# Patient Record
Sex: FEMALE | Race: WHITE | NOT HISPANIC OR LATINO | Employment: FULL TIME | URBAN - METROPOLITAN AREA
[De-identification: names, ages, dates, MRNs, and addresses within clinical notes are randomized per-mention and may not be internally consistent; named-entity substitution may affect disease eponyms.]

---

## 2017-04-10 ENCOUNTER — HOSPITAL ENCOUNTER (OUTPATIENT)
Dept: ULTRASOUND IMAGING | Facility: CLINIC | Age: 42
Discharge: HOME | End: 2017-04-10
Payer: COMMERCIAL

## 2017-04-10 ENCOUNTER — OFFICE VISIT (OUTPATIENT)
Dept: FAMILY MEDICINE | Facility: CLINIC | Age: 42
End: 2017-04-10
Payer: COMMERCIAL

## 2017-04-10 VITALS
HEIGHT: 65 IN | DIASTOLIC BLOOD PRESSURE: 74 MMHG | SYSTOLIC BLOOD PRESSURE: 102 MMHG | HEART RATE: 66 BPM | WEIGHT: 199 LBS | OXYGEN SATURATION: 98 % | BODY MASS INDEX: 33.15 KG/M2 | TEMPERATURE: 98.2 F

## 2017-04-10 DIAGNOSIS — R19.5 LOOSE STOOLS: ICD-10-CM

## 2017-04-10 DIAGNOSIS — R10.13 EPIGASTRIC PAIN: Primary | ICD-10-CM

## 2017-04-10 DIAGNOSIS — R10.13 ABDOMINAL PAIN, EPIGASTRIC: ICD-10-CM

## 2017-04-10 PROCEDURE — 36415 COLL VENOUS BLD VENIPUNCTURE: CPT | Performed by: PHYSICIAN ASSISTANT

## 2017-04-10 PROCEDURE — 99213 OFFICE O/P EST LOW 20 MIN: CPT | Performed by: PHYSICIAN ASSISTANT

## 2017-04-10 PROCEDURE — 76705 ECHO EXAM OF ABDOMEN: CPT

## 2017-04-10 PROCEDURE — 83516 IMMUNOASSAY NONANTIBODY: CPT | Mod: 59 | Performed by: PHYSICIAN ASSISTANT

## 2017-04-10 PROCEDURE — 83516 IMMUNOASSAY NONANTIBODY: CPT | Performed by: PHYSICIAN ASSISTANT

## 2017-04-10 NOTE — MR AVS SNAPSHOT
After Visit Summary   4/10/2017    Teresa Brian    MRN: [id number]           Patient Information     Date Of Birth          1975        Visit Information        Provider Department      4/10/2017 1:40 PM Anastasia Chen PA-C [facility]        Today's Diagnoses     Epigastric pain    -  1    Loose stools          Care Instructions    Hx/exam most suggestive that this is GI in origin.  Stop etoh use, spicy foods, and NSAID use.  Continue prilosec for now.  R/o h pylori and would advise EGD for further evaluation.  Follow-up pending results.  Consider GI consult if indicated.    Electronically Signed By: Anastasia Chen PA-C          Follow-ups after your visit        Additional Services     GASTROENTEROLOGY ADULT REF PROCEDURE ONLY       Last Lab Result: Creatinine (mg/dL)       Date                     Value                 04/07/2014               0.73             ----------  Body mass index is 33.12 kg/(m^2).      Patient will be contacted to schedule procedure.     Please be aware that coverage of these services is subject to the terms and limitations of your health insurance plan.  Call member services at your health plan with any benefit or coverage questions.  Any procedures must be performed at a [facility] facility OR coordinated by your clinic's referral office.    Please bring the following with you to your appointment:    (1) Any X-Rays, CTs or MRIs which have been performed.  Contact the facility where they were done to arrange for  prior to your scheduled appointment.    (2) List of current medications   (3) This referral request   (4) Any documents/labs given to you for this referral                  Your next 10 appointments already scheduled     Nov 30, 2017  3:00 PM CST   PHYSICAL with Daniella Duncan MD   [facility] ([facility])    [address] [address]  "  [phone]              Future tests that were ordered for you today     Open Future Orders        Priority Expected Expires Ordered    H Pylori antigen, stool Routine  5/10/2017 4/10/2017            Who to contact     If you have questions or need follow up information about today's clinic visit or your schedule please contact [facility] directly at [phone].  Normal or non-critical lab and imaging results will be communicated to you by MyChart, letter or phone within 4 business days after the clinic has received the results. If you do not hear from us within 7 days, please contact the clinic through [employer] or phone. If you have a critical or abnormal lab result, we will notify you by phone as soon as possible.  Submit refill requests through [employer] or call your pharmacy and they will forward the refill request to us. Please allow 3 business days for your refill to be completed.          Additional Information About Your Visit        [employer] Information     [employer] gives you secure access to your electronic health record. If you see a primary care provider, you can also send messages to your care team and make appointments. If you have questions, please call your primary care clinic.  If you do not have a primary care provider, please call [phone] and they will assist you.        Care EveryWhere ID     This is your Care EveryWhere ID. This could be used by other organizations to access your [facility] medical records  [id number]        Your Vitals Were     Pulse Temperature Height Pulse Oximetry Breastfeeding? BMI (Body Mass Index)    66 98.2  F (36.8  C) (Oral) 5' 5\" (1.651 m) 98% No 33.12 kg/m2       Blood Pressure from Last 3 Encounters:   04/10/17 102/74   12/20/16 104/72   11/29/16 120/80    Weight from Last 3 Encounters:   04/10/17 199 lb (90.3 kg)   12/20/16 207 lb (93.9 kg)   11/29/16 205 lb (93 kg)              We Performed the Following     GASTROENTEROLOGY ADULT REF PROCEDURE " ONLY     Tissue transglutaminase raymond IgA and IgG        Primary Care Provider Office Phone # Fax #    Anastasia Chen PA-C [phone]       [facility] [address] [address]        Thank you!     Thank you for choosing [facility]  for your care. Our goal is always to provide you with excellent care. Hearing back from our patients is one way we can continue to improve our services. Please take a few minutes to complete the written survey that you may receive in the mail after your visit with us. Thank you!             Your Updated Medication List - Protect others around you: Learn how to safely use, store and throw away your medicines at www.disposemymeds.org.          This list is accurate as of: 4/10/17  2:09 PM.  Always use your most recent med list.                   Brand Name Dispense Instructions for use    ACETAMINOPHEN PO          ibuprofen 800 MG tablet    ADVIL/MOTRIN    60 tablet    Take 1 tablet by mouth every 6 hours as needed for pain.       neomycin-polymyxin-dexamethasone 3.5-58023-7.1 Oint ophthalmic ointment    MAXITROL         OMEPRAZOLE PO      Take 20 mg by mouth       order for DME     1 Device    Equipment being ordered: Wrist/Thumb Brace

## 2017-04-10 NOTE — NURSING NOTE
"Chief Complaint   Patient presents with     Urgent Care     [facility] - [facility] on 4/7/2017       Initial /74 (BP Location: Right arm, Cuff Size: Adult Regular)  Pulse 66  Temp 98.2  F (36.8  C) (Oral)  Ht 5' 5\" (1.651 m)  Wt 199 lb (90.3 kg)  SpO2 98%  Breastfeeding? No  BMI 33.12 kg/m2 Estimated body mass index is 33.12 kg/(m^2) as calculated from the following:    Height as of this encounter: 5' 5\" (1.651 m).    Weight as of this encounter: 199 lb (90.3 kg).  Medication Reconciliation: complete    "

## 2017-04-10 NOTE — LETTER
[facility]          [address] [place] [address]                                           [phone]  April 18, 2017     Teresa Brian  [address] [address] [address]      Dear Teresa,    The results of your recent tests were reviewed and are as follows:    Normal celiac disease testing. Will await completion of h. pylori testing.    Enclosed is a copy of the results.     Thank you for choosing [facility].  We appreciate the opportunity to serve you and look forward to supporting your healthcare needs in the future.    If you have any questions or concerns, please call me or my staff at [phone].      Sincerely,    BRODIE Joe

## 2017-04-10 NOTE — PROGRESS NOTES
SUBJECTIVE:                                                    Teresa Brian is a 41 year old female who presents to clinic today for the following health issues:      ED/UC Followup:    Facility:  [facility] - Urgent [facility]  Date of visit: 4/7/2017  Reason for visit: Abdominal Pain - started in the middle of the night 3 weeks ago with sharp epigastric pain.  This was it's worst, but then ever since epigastric region has just been really sore.   Sometimes discomfort is sharp, dull and stabbing in nature.  Ranges from 3-5 in severity.   Is a 3/10 at a time.   Hard-boiled eggs, tangerines for breakfast.  Dinner between 5-7. Goes to bed between 10-midnight.  Occasional snack before bedtime.  No pop since last year. 1 cup coffee daily. Comfort creamer.   Etoh use 1-2 beers 2x per week.   Spicy food likes it, but doesn't do it all the time.  Endorses burpiness in her chest and heartburn once per week.   Sister is our MA and she advised pt to be seen.  UC report at [facility] was view via care-everywhere where she had neg CMP, CBC, lipase.   Stool pattern is stool every 2-3 days then will have diarrhea for 2-3 days with urgency.  Denies constipation.  Did loose some weight enrolling in SO new work-out plan provided through work, but then admits that habits regressed.  No melena or hematochezia.  Was then scheduled to have out-pt US this morning at [facility].  Started on prilosec 40mg daily for past 3 days and doesn't think this has helped any.   TUMs and gummy acid reducer without   Had a normal US this morning which was reviewed with pt in clinic.   Admits to taking ibuprofen 2-3 tabs daily for chronic neck, back and HAs. Will switch it up with tylenol. Started this about 1 yr ago and then more consistent almost daily for past 2 months.   No fhx of celiac, crohns, or UC.  LMP 4/8 - boyfriend had a vasectomy as well.     Had an EGD back in 1990's    Current Status: still in pain - has lab results as well as ultrasound at [facility]  today     Problem list and histories reviewed & adjusted, as indicated.  Additional history: as documented    Patient Active Problem List   Diagnosis     CARDIOVASCULAR SCREENING; LDL GOAL LESS THAN 160     BMI 34.0-34.9,adult     Menorrhagia     Personal history of cervical dysplasia     Past Surgical History:   Procedure Laterality Date     ARTHROSCOPIC RECONSTRUCTION ANTERIOR CRUCIATE LIGAMENT Right 10/2010    Reconstruction (ACL, MCL) and fibular fx     COLPOSCOPY CERVIX, LOOP ELECTRODE BIOPSY, COMBINED  08/2011    squamous metaplasia     HC TOOTH EXTRACTION W/FORCEP  2000    wisdom teeth extraction     LAPAROSCOPY DIAGNOSTIC (GYN)  1992    negative ([facility])       Social History   Substance Use Topics     Smoking status: Never Smoker     Smokeless tobacco: Never Used     Alcohol use 1.2 oz/week     2 Standard drinks or equivalent per week      Comment: 2 drinks per week avg     Family History   Problem Relation Age of Onset     Neurologic Disorder Father      parkinson with early dementia     Neurologic Disorder Mother      Fibromyalgia. migraines         Current Outpatient Prescriptions   Medication Sig Dispense Refill     OMEPRAZOLE PO Take 20 mg by mouth       ACETAMINOPHEN PO        order for DME Equipment being ordered: Wrist/Thumb Brace 1 Device 0     neomycin-polymyxin-dexamethasone (MAXITROL) 3.5-29991-1.1 OINT ophthalmic ointment        ibuprofen (ADVIL,MOTRIN) 800 MG tablet Take 1 tablet by mouth every 6 hours as needed for pain. 60 tablet 1     Allergies   Allergen Reactions     Vicodin [Hydrocodone-Acetaminophen] Nausea and Vomiting       Reviewed and updated as needed this visit by clinical staff       Reviewed and updated as needed this visit by Provider         ROS:  Constitutional, HEENT, cardiovascular, pulmonary, gi and gu systems are negative, except as otherwise noted.    OBJECTIVE:                                                    /74 (BP Location: Right arm, Cuff Size: Adult  "Regular)  Pulse 66  Temp 98.2  F (36.8  C) (Oral)  Ht 5' 5\" (1.651 m)  Wt 199 lb (90.3 kg)  SpO2 98%  Breastfeeding? No  BMI 33.12 kg/m2  Body mass index is 33.12 kg/(m^2).  GENERAL: healthy, alert and no distress  EYES: Eyes grossly normal to inspection, PERRL and conjunctivae and sclerae normal  HENT: nose and mouth without ulcers or lesions  NECK: no adenopathy, no asymmetry, masses, or scars and thyroid normal to palpation  RESP: lungs clear to auscultation - no rales, rhonchi or wheezes  CV: regular rate and rhythm, normal S1 S2, no S3 or S4, no murmur, click or rub, no peripheral edema and peripheral pulses strong  ABDOMEN: soft, but does have mid to LUQ epigastric tenderness to palpation. No other abdominal tenderness, guarding or rebound. No organomegaly.     Diagnostic Test Results:  none      ASSESSMENT/PLAN:                                                        ICD-10-CM    1. Epigastric pain R10.13 GASTROENTEROLOGY ADULT REF PROCEDURE ONLY     H Pylori antigen, stool   2. Loose stools R19.5 Tissue transglutaminase raymond IgA and IgG   ?gastritis versus r/o PUD versus h pylori.  Hx of recurrent loose stools - will screen for celiac.  Consider colonoscopy as well given varying bowel pattern to r/o obstructive pathology versus GI consult.  Long-term risks of NSAIDs reviewed as well and advised to stop this.  Would advise f/u visit to review other chronic conditions to see if we can treat this better so she does not use NSAIDs. Pt in agreement with plan.  See Patient Instructions  Patient Instructions   Hx/exam most suggestive that this is GI in origin.  Stop etoh use, spicy foods, and NSAID use.  Continue prilosec for now.  R/o h pylori and would advise EGD for further evaluation.  Follow-up pending results.  Consider GI consult if indicated.    Electronically Signed By: Anastasia Chen PA-C            "

## 2017-04-10 NOTE — PATIENT INSTRUCTIONS
Hx/exam most suggestive that this is GI in origin.  Stop etoh use, spicy foods, and NSAID use.  Continue prilosec for now.  R/o h pylori and would advise EGD for further evaluation.  Follow-up pending results.  Consider GI consult if indicated.    Electronically Signed By: Anastasia Chen PA-C

## 2017-04-12 LAB
TTG IGA SER-ACNC: NORMAL U/ML
TTG IGG SER-ACNC: NORMAL U/ML

## 2017-04-18 NOTE — PROGRESS NOTES
Please call or write patient with the following results:    -Normal celiac disease testing. Will await completion of h. pylori testing.    Electronically Signed By: Anastasia Chen PA-C

## 2017-04-24 ENCOUNTER — OFFICE VISIT (OUTPATIENT)
Dept: FAMILY MEDICINE | Facility: CLINIC | Age: 42
End: 2017-04-24
Payer: COMMERCIAL

## 2017-04-24 VITALS
WEIGHT: 198 LBS | OXYGEN SATURATION: 98 % | HEIGHT: 65 IN | SYSTOLIC BLOOD PRESSURE: 112 MMHG | HEART RATE: 80 BPM | BODY MASS INDEX: 32.99 KG/M2 | TEMPERATURE: 98.8 F | DIASTOLIC BLOOD PRESSURE: 76 MMHG

## 2017-04-24 DIAGNOSIS — R10.13 EPIGASTRIC PAIN: ICD-10-CM

## 2017-04-24 DIAGNOSIS — K59.00 CONSTIPATION, UNSPECIFIED CONSTIPATION TYPE: ICD-10-CM

## 2017-04-24 DIAGNOSIS — H26.9 CATARACT: ICD-10-CM

## 2017-04-24 DIAGNOSIS — R42 DIZZINESS: Primary | ICD-10-CM

## 2017-04-24 DIAGNOSIS — H57.9 VISUAL SYMPTOMS: ICD-10-CM

## 2017-04-24 DIAGNOSIS — R19.5 LOOSE STOOLS: ICD-10-CM

## 2017-04-24 DIAGNOSIS — G43.109 MIGRAINE WITH AURA AND WITHOUT STATUS MIGRAINOSUS, NOT INTRACTABLE: ICD-10-CM

## 2017-04-24 PROCEDURE — 99215 OFFICE O/P EST HI 40 MIN: CPT | Performed by: PHYSICIAN ASSISTANT

## 2017-04-24 PROCEDURE — 93000 ELECTROCARDIOGRAM COMPLETE: CPT | Performed by: PHYSICIAN ASSISTANT

## 2017-04-24 RX ORDER — SUMATRIPTAN 25 MG/1
25-50 TABLET, FILM COATED ORAL
Qty: 9 TABLET | Refills: 1 | Status: SHIPPED | OUTPATIENT
Start: 2017-04-24 | End: 2018-01-22

## 2017-04-24 NOTE — NURSING NOTE
"Chief Complaint   Patient presents with     Abdominal Pain     Dizziness       Initial /76 (BP Location: Right arm, Cuff Size: Adult Large)  Pulse 80  Temp 98.8  F (37.1  C) (Oral)  Ht 5' 5\" (1.651 m)  Wt 198 lb (89.8 kg)  SpO2 98%  Breastfeeding? No  BMI 32.95 kg/m2 Estimated body mass index is 32.95 kg/(m^2) as calculated from the following:    Height as of this encounter: 5' 5\" (1.651 m).    Weight as of this encounter: 198 lb (89.8 kg).  Medication Reconciliation: complete    "

## 2017-04-24 NOTE — PATIENT INSTRUCTIONS
Glad you've had some improvement.  Given persistent epigastric pain and overall abdominal discomfort would advise additional work-up with EGD and colonoscopy.  Unclear if underlying dizziness is due to migraine aura versus complicated by cataract.  Re-trial abortive medication with imitrex.  Glad you've already scheduled an appt with neurology to help optimize overall HA care.  EKG was normal today.  Prior work-up with nl CBC, CMP reassuring as well.  Could consider further imaging if not improving with that noted above.  F/u pending results.    Electronically Signed By: Anastasia Chen PA-C

## 2017-04-24 NOTE — PROGRESS NOTES
SUBJECTIVE:                                                    Teresa Brian is a 41 year old female who presents to clinic today for the following health issues:      Followup from abdominal pain - hard/sharp pain is no longer there, but continues to have stomach aches. Sometimes will feel like she has to stool, but can't. Then will have loose stools and urgency. No trial of fiber supplement.  Remote past did colace.  No longer having any persistent pain and it comes/goes.   If can't stool its 7, but usually on a 2/10.  No fhx of celiac, crohns or UC. No colon cancer.     Doing work-out routine where she runs or toning with wall squats and sometimes weights.  C25K program for 30min 3x per week. [employer] that connects to her phone and helps build up your endurance.       Has made some diet changes - limited coffee, no nsaids and it has seemed to improve a little still has a slight pain. Stopped taking tylenol as well.     Main concern is she also wants to follow-up on Dizziness - had one episode where she was driving and felt like she was going to pass out. Never had a recurrence so didn't think much of it.  Now concerned as she's slowly seems to continue to have episodes where she feels floaty, fuzzy that comes and goes.  Saturday she had it all day.   Interestingly, she then got a migraine and the floaty/fuzzy feeling resolved.  Sunday she had it all day again/felt cloudy/light-headed/dizzy.   No dieudonne syncope.  Fuzziness doesn't seem to be in the morning after a night of rest.  No CP, SOB, or palpitations.  Was able to exercise last week without any limitations - see notes above. Stopped her exercise though this week as didn't want to make things worse.  Non-smoker.  No fhx of CAD.  No melena, hematochezia, hemoptysis.   Just got diagnosed with cataracts and wonders if this could also be playing a role.  Has pre-op Wednesday for cataract. Younger sister a;ready had to have cataract removed.    Fhx: dad - brain  aneurysm s/p embolization. Found as part of check for his Parkinson's.    Chronic migraine hx - Has tried zomig, relpax, imitrex and she just stopped because she didn't want to have to keep coming in for appt.   Did make an appt on 5/9 for neurology for work-up her HA's further. [facility] of neurology.         Problem list and histories reviewed & adjusted, as indicated.  Additional history: as documented    Patient Active Problem List   Diagnosis     CARDIOVASCULAR SCREENING; LDL GOAL LESS THAN 160     BMI 34.0-34.9,adult     Menorrhagia     Personal history of cervical dysplasia     Past Surgical History:   Procedure Laterality Date     ARTHROSCOPIC RECONSTRUCTION ANTERIOR CRUCIATE LIGAMENT Right 10/2010    Reconstruction (ACL, MCL) and fibular fx     COLPOSCOPY CERVIX, LOOP ELECTRODE BIOPSY, COMBINED  08/2011    squamous metaplasia     HC TOOTH EXTRACTION W/FORCEP  2000    wisdom teeth extraction     LAPAROSCOPY DIAGNOSTIC (GYN)  1992    negative ([facility])       Social History   Substance Use Topics     Smoking status: Never Smoker     Smokeless tobacco: Never Used     Alcohol use 1.2 oz/week     2 Standard drinks or equivalent per week      Comment: 2 drinks per week avg     Family History   Problem Relation Age of Onset     Neurologic Disorder Mother      Fibromyalgia. migraines     Neurologic Disorder Father      parkinson with early dementia         Current Outpatient Prescriptions   Medication Sig Dispense Refill     order for DME Equipment being ordered: Wrist/Thumb Brace 1 Device 0     neomycin-polymyxin-dexamethasone (MAXITROL) 3.5-55028-6.1 OINT ophthalmic ointment        OMEPRAZOLE PO Take 20 mg by mouth       ACETAMINOPHEN PO        ibuprofen (ADVIL,MOTRIN) 800 MG tablet Take 1 tablet by mouth every 6 hours as needed for pain. 60 tablet 1     Allergies   Allergen Reactions     Vicodin [Hydrocodone-Acetaminophen] Nausea and Vomiting       Reviewed and updated as needed this visit by clinical  "staff       Reviewed and updated as needed this visit by Provider         ROS:  Constitutional, HEENT, cardiovascular, pulmonary, GI, , musculoskeletal, neuro, skin, endocrine and psych systems are negative, except as otherwise noted.    OBJECTIVE:                                                    /76 (BP Location: Right arm, Cuff Size: Adult Large)  Pulse 80  Temp 98.8  F (37.1  C) (Oral)  Ht 5' 5\" (1.651 m)  Wt 198 lb (89.8 kg)  SpO2 98%  Breastfeeding? No  BMI 32.95 kg/m2  Body mass index is 32.95 kg/(m^2).  GENERAL: healthy, alert and no distress  EYES: Eyes grossly normal to inspection, PERRL and conjunctivae and sclerae normal  HENT: ear canals and TM's normal, nose and mouth without ulcers or lesions  NECK: no adenopathy, no asymmetry, masses, or scars and thyroid normal to palpation  RESP: lungs clear to auscultation - no rales, rhonchi or wheezes  CV: regular rate and rhythm, normal S1 S2, no S3 or S4, no murmur, click or rub, no peripheral edema and peripheral pulses strong  ABDOMEN: bowel sounds normal continues to have mild epigastric discomfort.  NEURO: A&Ox3. No facial asymmetry. Cranial nerves grossly intact. Rapid alternating hand movements, finger-to-nose, and heel-to-shin exercises intact. Negative Romberg.     Diagnostic Test Results:  EKG - personal reading shows NSR, RSR (V1), without ST segment changes or LVH. Unchanged from tracing in 2012.     ASSESSMENT/PLAN:                                                        ICD-10-CM    1. Dizziness R42 EKG 12-lead complete w/read - Clinics   2. Visual symptoms - \"fuzziness\" per pt H57.9    3. Epigastric pain R10.13 GASTROENTEROLOGY ADULT REF PROCEDURE ONLY   4. Cataract H26.9    5. Migraine with aura and without status migrainosus, not intractable G43.109 SUMAtriptan (IMITREX) 25 MG tablet   6. Loose stools R19.5 GASTROENTEROLOGY ADULT REF PROCEDURE ONLY   7. Constipation, unspecified constipation type K59.00 GASTROENTEROLOGY ADULT REF " PROCEDURE ONLY   Some improvement of epigastric pain after d/c OTC meds and trial of omeprazole.  Given continued tenderness though advised further eval with EGD - would also likely benefit from colonoscopy given hx of constipation/loose stools.  Fuzziness/dizzy/lightheaded sensation seems less likely cardiac in origin given can run on treadmill and no hx of syncope. Did complete EKG today though and this was normal.  Suspect this is may be more a combination of cataracts and complicated by hx of her migraines.  Trial imitrex as had no abortive therapy for 2 yrs and she already scheduled a neurology consult which I encouraged her to keep.  A total of 50 minutes was spent with the patient today, with greater than 50% of the visit involving counseling and coordination of care regarding multiple concerns noted above and in pt instructions.  See Patient Instructions  Patient Instructions   Glad you've had some improvement.  Given persistent epigastric pain and overall abdominal discomfort would advise additional work-up with EGD and colonoscopy.  Unclear if underlying dizziness is due to migraine aura versus complicated by cataract.  Re-trial abortive medication with imitrex.  Glad you've already scheduled an appt with neurology to help optimize overall HA care.  EKG was normal today.  Prior work-up with nl CBC, CMP reassuring as well.  Could consider further imaging if not improving with that noted above.  F/u pending results.    Electronically Signed By: Anastasia Chen PA-C

## 2017-04-24 NOTE — MR AVS SNAPSHOT
After Visit Summary   4/24/2017    Teresa Brian    MRN: [id number]           Patient Information     Date Of Birth          1975        Visit Information        Provider Department      4/24/2017 5:20 PM Anastasia Chen PA-C [facility]        Today's Diagnoses     Dizziness    -  1    Epigastric pain        Cataract        Migraine with aura and without status migrainosus, not intractable        Loose stools        Constipation, unspecified constipation type          Care Instructions    Glad you've had some improvement.  Given persistent epigastric pain and overall abdominal discomfort would advise additional work-up with EGD and colonoscopy.  Unclear if underlying dizziness is due to migraine aura versus complicated by cataract.  Re-trial abortive medication with imitrex.  Glad you've already scheduled an appt with neurology to help optimize overall HA care.  EKG was normal today.  Prior work-up with nl CBC, CMP reassuring as well.  Could consider further imaging if not improving with that noted above.  F/u pending results.    Electronically Signed By: Anastasia Chen PA-C          Follow-ups after your visit        Additional Services     GASTROENTEROLOGY ADULT REF PROCEDURE ONLY       Last Lab Result: Creatinine (mg/dL)       Date                     Value                 04/07/2014               0.73             ----------  Body mass index is 32.95 kg/(m^2).      Patient will be contacted to schedule procedure.     Please be aware that coverage of these services is subject to the terms and limitations of your health insurance plan.  Call member services at your health plan with any benefit or coverage questions.  Any procedures must be performed at a [facility] facility OR coordinated by your clinic's referral office.    Please bring the following with you to your appointment:    (1) Any X-Rays, CTs or MRIs which have been performed.  Contact the facility where  they were done to arrange for  prior to your scheduled appointment.    (2) List of current medications   (3) This referral request   (4) Any documents/labs given to you for this referral                  Your next 10 appointments already scheduled     Apr 26, 2017  4:00 PM CDT   Pre-Op physical with Anastasia Chen PA-C   [facility]    [address]  [facility] [address] [phone]            Nov 30, 2017  3:00 PM CST   PHYSICAL with Daniella Duncan MD   [facility] ([facility])    [address]  [place] [address] [phone]              Who to contact     If you have questions or need follow up information about today's clinic visit or your schedule please contact [facility] directly at [phone].  Normal or non-critical lab and imaging results will be communicated to you by MyChart, letter or phone within 4 business days after the clinic has received the results. If you do not hear from us within 7 days, please contact the clinic through [employer] or phone. If you have a critical or abnormal lab result, we will notify you by phone as soon as possible.  Submit refill requests through [employer] or call your pharmacy and they will forward the refill request to us. Please allow 3 business days for your refill to be completed.          Additional Information About Your Visit        [employer] Information     [employer] gives you secure access to your electronic health record. If you see a primary care provider, you can also send messages to your care team and make appointments. If you have questions, please call your primary care clinic.  If you do not have a primary care provider, please call [phone] and they will assist you.        Care EveryWhere ID     This is your Care EveryWhere ID. This could be used by other organizations to access your [facility]  "records  [id number]        Your Vitals Were     Pulse Temperature Height Pulse Oximetry Breastfeeding? BMI (Body Mass Index)    80 98.8  F (37.1  C) (Oral) 5' 5\" (1.651 m) 98% No 32.95 kg/m2       Blood Pressure from Last 3 Encounters:   04/24/17 112/76   04/10/17 102/74   12/20/16 104/72    Weight from Last 3 Encounters:   04/24/17 198 lb (89.8 kg)   04/10/17 199 lb (90.3 kg)   12/20/16 207 lb (93.9 kg)              We Performed the Following     EKG 12-lead complete w/read - Clinics     GASTROENTEROLOGY ADULT REF PROCEDURE ONLY          Today's Medication Changes          These changes are accurate as of: 4/24/17  6:29 PM.  If you have any questions, ask your nurse or doctor.               Start taking these medicines.        Dose/Directions    SUMAtriptan 25 MG tablet   Commonly known as:  IMITREX   Used for:  Migraine with aura and without status migrainosus, not intractable   Started by:  Anastasia Chen PA-C        Dose:  25-50 mg   Take 1-2 tablets (25-50 mg) by mouth at onset of headache for migraine May repeat in 2 hours. Max 8 tablets/24 hours.   Quantity:  9 tablet   Refills:  1            Where to get your medicines      These medications were sent to [facility] [address] [place] [address]  [id number] [place] [address]     Phone:  [phone]     SUMAtriptan 25 MG tablet                Primary Care Provider Office Phone # Fax #    Anastasia Chen PA-C [phone]       [facility] [address] [facility] [address]        Thank you!     Thank you for choosing [facility]  for your care. Our goal is always to provide you with excellent care. Hearing back from our patients is one way we can continue to improve our services. Please take a few minutes to complete the written survey that you may receive in the mail after your visit with us. Thank you!             Your Updated Medication List - Protect " others around you: Learn how to safely use, store and throw away your medicines at www.disposemymeds.org.          This list is accurate as of: 4/24/17  6:29 PM.  Always use your most recent med list.                   Brand Name Dispense Instructions for use    ACETAMINOPHEN PO          ibuprofen 800 MG tablet    ADVIL/MOTRIN    60 tablet    Take 1 tablet by mouth every 6 hours as needed for pain.       neomycin-polymyxin-dexamethasone 3.5-47552-4.1 Oint ophthalmic ointment    MAXITROL         OMEPRAZOLE PO      Take 20 mg by mouth       order for DME     1 Device    Equipment being ordered: Wrist/Thumb Brace       SUMAtriptan 25 MG tablet    IMITREX    9 tablet    Take 1-2 tablets (25-50 mg) by mouth at onset of headache for migraine May repeat in 2 hours. Max 8 tablets/24 hours.

## 2017-04-25 ENCOUNTER — TELEPHONE (OUTPATIENT)
Dept: FAMILY MEDICINE | Facility: CLINIC | Age: 42
End: 2017-04-25

## 2017-04-26 ENCOUNTER — OFFICE VISIT (OUTPATIENT)
Dept: FAMILY MEDICINE | Facility: CLINIC | Age: 42
End: 2017-04-26
Payer: COMMERCIAL

## 2017-04-26 VITALS
TEMPERATURE: 97.8 F | BODY MASS INDEX: 33.15 KG/M2 | DIASTOLIC BLOOD PRESSURE: 78 MMHG | OXYGEN SATURATION: 99 % | WEIGHT: 199 LBS | HEIGHT: 65 IN | SYSTOLIC BLOOD PRESSURE: 108 MMHG | HEART RATE: 82 BPM

## 2017-04-26 DIAGNOSIS — Z01.818 PREOP GENERAL PHYSICAL EXAM: Primary | ICD-10-CM

## 2017-04-26 DIAGNOSIS — H26.9 CATARACT: ICD-10-CM

## 2017-04-26 PROCEDURE — 99214 OFFICE O/P EST MOD 30 MIN: CPT | Performed by: PHYSICIAN ASSISTANT

## 2017-04-26 NOTE — PROGRESS NOTES
[facility]  [address] [address] [phone]  Dept: [phone]    PRE-OP EVALUATION:  Today's date: 2017    Teresa Brian (: 1975) presents for pre-operative evaluation assessment as requested by Dr. Rios.  She requires evaluation and anesthesia risk assessment prior to undergoing surgery/procedure for treatment of cataract .  Proposed procedure: Cataract    Date of Surgery/ Procedure: 2017  Time of Surgery/ Procedure: 9 am  Hospital/Surgical Facility: [facility]  Fax number for surgical facility: [phone]  Primary Physician: Anastasia Chen  Type of Anesthesia Anticipated: Local    Patient has a Health Care Directive or Living Will:  NO    1. NO - Do you have a history of heart attack, stroke, stent, bypass or surgery on an artery in the head, neck, heart or legs?  2. NO - Do you ever have any pain or discomfort in your chest?  3. NO - Do you have a history of  Heart Failure?  4. NO - Are you troubled by shortness of breath when: walking on the level, up a slight hill or at night?  5. NO - Do you currently have a cold, bronchitis or other respiratory infection?  6. NO - Do you have a cough, shortness of breath or wheezing?  7. NO - Do you sometimes get pains in the calves of your legs when you walk?  8. NO - Do you or anyone in your family have previous history of blood clots?  9. NO - Do you or does anyone in your family have a serious bleeding problem such as prolonged bleeding following surgeries or cuts?  10. NO - Have you ever had problems with anemia or been told to take iron pills?  11. NO - Have you had any abnormal blood loss such as black, tarry or bloody stools, or abnormal vaginal bleeding?  12. NO - Have you ever had a blood transfusion?  13. YES - Have you or any of your relatives ever had problems with anesthesia? Gets nausea every time.  14. NO - Do you have sleep apnea, excessive snoring or daytime  "drowsiness?  15. NO - Do you have any prosthetic heart valves?  16. NO - Do you have prosthetic joints?  17. NO - Is there any chance that you may be pregnant?      HPI:                                                      Brief HPI related to upcoming procedure: Teresa is a 42 yo female here today for pre-op. Had been having worsening vision problems and \"fuzziness\" and was evaluated by Dr. Rios at [facility] and was diagnosed with cataracts. R worse than L and planning to just proceed with R side at this time. Her younger sister has required the same and she is 2 years younger than she. Now she is pursuing cataract removal as well.       See problem list for active medical problems.  Problems all longstanding and stable, except as noted/documented.  See ROS for pertinent symptoms related to these conditions.                                                                                                     MEDICAL HISTORY:                                                      Patient Active Problem List    Diagnosis Date Noted     Personal history of cervical dysplasia      Priority: Medium     (2009) JOANNA I; (2010) JOANNA I and focal JOANNA II       BMI 34.0-34.9,adult 10/06/2015     Priority: Medium     Menorrhagia 10/06/2015     Priority: Medium     CARDIOVASCULAR SCREENING; LDL GOAL LESS THAN 160 10/31/2010     Priority: Medium      Past Medical History:   Diagnosis Date     Asthma      CARDIOVASCULAR SCREENING; LDL GOAL LESS THAN 160      Cervical dysplasia 2010 2009-colpo @ [facility]-->JOANNA 1; persistent LGSIL/ASC-US paps; colposcopy 12/2010 with JOANNA 1, focal JOANNA 2 and benign ECC     Condyloma acuminatum 4/2009    perirecal BX      Herpes simplex virus infection     HSV-1     Menarche age 15    cycles q 3-4 x 3-4 d w cramps     Menorrhagia      Migraine      Personal history of cervical dysplasia 2010    (2009) JOANNA I; (2010) JOANNA I and focal JOANNA II     Past Surgical History:   Procedure Laterality " "Date     ARTHROSCOPIC RECONSTRUCTION ANTERIOR CRUCIATE LIGAMENT Right 10/2010    Reconstruction (ACL, MCL) and fibular fx     COLPOSCOPY CERVIX, LOOP ELECTRODE BIOPSY, COMBINED  08/2011    squamous metaplasia     HC TOOTH EXTRACTION W/FORCEP  2000    wisdom teeth extraction     LAPAROSCOPY DIAGNOSTIC (GYN)  1992    negative ([facility])     Current Outpatient Prescriptions   Medication Sig Dispense Refill     SUMAtriptan (IMITREX) 25 MG tablet Take 1-2 tablets (25-50 mg) by mouth at onset of headache for migraine May repeat in 2 hours. Max 8 tablets/24 hours. 9 tablet 1     order for DME Equipment being ordered: Wrist/Thumb Brace 1 Device 0     neomycin-polymyxin-dexamethasone (MAXITROL) 3.5-63915-6.1 OINT ophthalmic ointment        OMEPRAZOLE PO Take 20 mg by mouth       ACETAMINOPHEN PO        ibuprofen (ADVIL,MOTRIN) 800 MG tablet Take 1 tablet by mouth every 6 hours as needed for pain. 60 tablet 1     OTC products: omeprazole    Allergies   Allergen Reactions     Vicodin [Hydrocodone-Acetaminophen] Nausea and Vomiting      Latex Allergy: NO    Social History   Substance Use Topics     Smoking status: Never Smoker     Smokeless tobacco: Never Used     Alcohol use 1.2 oz/week     2 Standard drinks or equivalent per week      Comment: 2 drinks per week avg     History   Drug Use No       REVIEW OF SYSTEMS:                                                    C: NEGATIVE for fever, chills, change in weight  E/M: NEGATIVE for ear, mouth and throat problems  R: NEGATIVE for significant cough or SOB  CV: NEGATIVE for chest pain, palpitations or peripheral edema    EXAM:                                                    /78 (BP Location: Right arm, Cuff Size: Adult Large)  Pulse 82  Temp 97.8  F (36.6  C) (Oral)  Ht 5' 5\" (1.651 m)  Wt 199 lb (90.3 kg)  SpO2 99%  Breastfeeding? No  BMI 33.12 kg/m2  GENERAL APPEARANCE: healthy, alert and no distress  HENT: ear canals and TM's normal and nose and mouth without " ulcers or lesions  RESP: lungs clear to auscultation - no rales, rhonchi or wheezes  CV: regular rate and rhythm, normal S1 S2, no S3 or S4 and no murmur, click or rub   ABDOMEN: soft, nontender, no HSM or masses and bowel sounds normal  NEURO: Normal strength and tone, sensory exam grossly normal, mentation intact and speech normal    DIAGNOSTICS:                                                    No labs or EKG required for low risk surgery (cataract, skin procedure, breast biopsy, etc)    Recent Labs   Lab Test  10/23/15   0748  04/07/14   2245  10/23/13   1240   HGB  13.3  13.5   --    PLT  218  227   --    NA   --   139  141   POTASSIUM   --   3.7  5.2   CR   --   0.73  0.74        IMPRESSION:                                                    Reason for surgery/procedure: cataract   Diagnosis/reason for consult: pre-op, obesity, migraine.    The proposed surgical procedure is considered LOW risk.    REVISED CARDIAC RISK INDEX  The patient has the following serious cardiovascular risks for perioperative complications such as (MI, PE, VFib and 3  AV Block):  No serious cardiac risks  INTERPRETATION: 0 risks: Class I (very low risk - 0.4% complication rate)    The patient has the following additional risks for perioperative complications:  No identified additional risks      ICD-10-CM    1. Preop general physical exam Z01.818    2. BMI 34.0-34.9,adult Z68.34    3. Cataract H26.9        RECOMMENDATIONS:                                                      APPROVAL GIVEN to proceed with proposed procedure, without further diagnostic evaluation     Signed Electronically by: Anastasia Chen PA-C    Copy of this evaluation report is provided to requesting physician.    [facility] Preop Guidelines

## 2017-04-26 NOTE — MR AVS SNAPSHOT
After Visit Summary   4/26/2017    Teresa Brian    MRN: [id number]           Patient Information     Date Of Birth          1975        Visit Information        Provider Department      4/26/2017 4:00 PM Anastasia Chen PA-C [facility]        Today's Diagnoses     Preop general physical exam    -  1    BMI 34.0-34.9,adult        Cataract          Care Instructions      Before Your Surgery      Call your surgeon if there is any change in your health. This includes signs of a cold or flu (such as a sore throat, runny nose, cough, rash or fever).    Do not smoke, drink alcohol or take over the counter medicine (unless your surgeon or primary care doctor tells you to) for the 24 hours before and after surgery.    If you take prescribed drugs: Follow your doctor s orders about which medicines to take and which to stop until after surgery.    Eating and drinking prior to surgery: follow the instructions from your surgeon    Take a shower or bath the night before surgery. Use the soap your surgeon gave you to gently clean your skin. If you do not have soap from your surgeon, use your regular soap. Do not shave or scrub the surgery site.  Wear clean pajamas and have clean sheets on your bed.         Follow-ups after your visit        Your next 10 appointments already scheduled     Nov 30, 2017  3:00 PM CST   PHYSICAL with Daniella Duncan MD   [facility] ([facility])    [address] [place] [address] [phone]              Who to contact     If you have questions or need follow up information about today's clinic visit or your schedule please contact [facility] directly at [phone].  Normal or non-critical lab and imaging results will be communicated to you by MyChart, letter or phone within 4 business days after the clinic has received the results. If you do not hear from us within 7  "days, please contact the clinic through [employer] or phone. If you have a critical or abnormal lab result, we will notify you by phone as soon as possible.  Submit refill requests through [employer] or call your pharmacy and they will forward the refill request to us. Please allow 3 business days for your refill to be completed.          Additional Information About Your Visit        [employer] Information     [employer] gives you secure access to your electronic health record. If you see a primary care provider, you can also send messages to your care team and make appointments. If you have questions, please call your primary care clinic.  If you do not have a primary care provider, please call [phone] and they will assist you.        Care EveryWhere ID     This is your Care EveryWhere ID. This could be used by other organizations to access your [facility] medical records  [id number]        Your Vitals Were     Pulse Temperature Height Pulse Oximetry Breastfeeding? BMI (Body Mass Index)    82 97.8  F (36.6  C) (Oral) 5' 5\" (1.651 m) 99% No 33.12 kg/m2       Blood Pressure from Last 3 Encounters:   04/26/17 108/78   04/24/17 112/76   04/10/17 102/74    Weight from Last 3 Encounters:   04/26/17 199 lb (90.3 kg)   04/24/17 198 lb (89.8 kg)   04/10/17 199 lb (90.3 kg)              Today, you had the following     No orders found for display       Primary Care Provider Office Phone # Fax #    Anastasia Chen PA-C [phone]       [facility] [address] [address]        Thank you!     Thank you for choosing [facility]  for your care. Our goal is always to provide you with excellent care. Hearing back from our patients is one way we can continue to improve our services. Please take a few minutes to complete the written survey that you may receive in the mail after your visit with us. Thank you!             Your Updated Medication List - Protect others around you: " Learn how to safely use, store and throw away your medicines at www.disposemymeds.org.          This list is accurate as of: 4/26/17  4:39 PM.  Always use your most recent med list.                   Brand Name Dispense Instructions for use    ACETAMINOPHEN PO          ibuprofen 800 MG tablet    ADVIL/MOTRIN    60 tablet    Take 1 tablet by mouth every 6 hours as needed for pain.       neomycin-polymyxin-dexamethasone 3.5-08989-3.1 Oint ophthalmic ointment    MAXITROL         OMEPRAZOLE PO      Take 20 mg by mouth       order for DME     1 Device    Equipment being ordered: Wrist/Thumb Brace       SUMAtriptan 25 MG tablet    IMITREX    9 tablet    Take 1-2 tablets (25-50 mg) by mouth at onset of headache for migraine May repeat in 2 hours. Max 8 tablets/24 hours.

## 2017-04-26 NOTE — NURSING NOTE
"Chief Complaint   Patient presents with     Pre-Op Exam       Initial /78 (BP Location: Right arm, Cuff Size: Adult Large)  Pulse 82  Temp 97.8  F (36.6  C) (Oral)  Ht 5' 5\" (1.651 m)  Wt 199 lb (90.3 kg)  SpO2 99%  Breastfeeding? No  BMI 33.12 kg/m2 Estimated body mass index is 33.12 kg/(m^2) as calculated from the following:    Height as of this encounter: 5' 5\" (1.651 m).    Weight as of this encounter: 199 lb (90.3 kg).  Medication Reconciliation: complete    "

## 2017-04-27 PROBLEM — G43.109 MIGRAINE WITH AURA AND WITHOUT STATUS MIGRAINOSUS, NOT INTRACTABLE: Status: ACTIVE | Noted: 2017-04-27

## 2017-05-03 PROCEDURE — 87338 HPYLORI STOOL AG IA: CPT | Performed by: PHYSICIAN ASSISTANT

## 2017-05-04 DIAGNOSIS — R10.13 EPIGASTRIC PAIN: ICD-10-CM

## 2017-05-05 LAB
H PYLORI AG STL QL IA: NORMAL
MICRO REPORT STATUS: NORMAL
SPECIMEN SOURCE: NORMAL

## 2017-05-06 NOTE — PROGRESS NOTES
Please call or write patient with the following results:    H. Pylori testing was normal/negative. Follow-up as discussed in clinic.    Electronically Signed By: Anastasia Chen PA-C

## 2017-05-12 ENCOUNTER — TELEPHONE (OUTPATIENT)
Dept: FAMILY MEDICINE | Facility: CLINIC | Age: 42
End: 2017-05-12

## 2017-05-12 NOTE — TELEPHONE ENCOUNTER
Third attempt to reach patient to schedule Colonoscopy & EGD. Not Scheduled at [facility]. Left Messages.

## 2017-06-27 ENCOUNTER — HOSPITAL ENCOUNTER (OUTPATIENT)
Facility: CLINIC | Age: 42
Discharge: HOME | End: 2017-06-27
Attending: INTERNAL MEDICINE | Admitting: INTERNAL MEDICINE
Payer: COMMERCIAL

## 2017-06-27 VITALS
WEIGHT: 195 LBS | BODY MASS INDEX: 32.49 KG/M2 | SYSTOLIC BLOOD PRESSURE: 132 MMHG | RESPIRATION RATE: 16 BRPM | DIASTOLIC BLOOD PRESSURE: 84 MMHG | HEIGHT: 65 IN | OXYGEN SATURATION: 99 %

## 2017-06-27 LAB
COLONOSCOPY: NORMAL
UPPER GI ENDOSCOPY: NORMAL

## 2017-06-27 PROCEDURE — G0500 MOD SEDAT ENDO SERVICE >5YRS: HCPCS | Performed by: INTERNAL MEDICINE

## 2017-06-27 PROCEDURE — 43239 EGD BIOPSY SINGLE/MULTIPLE: CPT | Performed by: INTERNAL MEDICINE

## 2017-06-27 PROCEDURE — 25000132 ZZH RX MED GY IP 250 OP 250 PS 637: Performed by: INTERNAL MEDICINE

## 2017-06-27 PROCEDURE — 99153 MOD SED SAME PHYS/QHP EA: CPT | Performed by: INTERNAL MEDICINE

## 2017-06-27 PROCEDURE — 25000128 H RX IP 250 OP 636: Performed by: INTERNAL MEDICINE

## 2017-06-27 PROCEDURE — 88305 TISSUE EXAM BY PATHOLOGIST: CPT | Mod: 26 | Performed by: INTERNAL MEDICINE

## 2017-06-27 PROCEDURE — 45378 DIAGNOSTIC COLONOSCOPY: CPT | Performed by: INTERNAL MEDICINE

## 2017-06-27 PROCEDURE — 88305 TISSUE EXAM BY PATHOLOGIST: CPT | Performed by: INTERNAL MEDICINE

## 2017-06-27 RX ORDER — NALOXONE HYDROCHLORIDE 0.4 MG/ML
.1-.4 INJECTION, SOLUTION INTRAMUSCULAR; INTRAVENOUS; SUBCUTANEOUS
Status: DISCONTINUED | OUTPATIENT
Start: 2017-06-27 | End: 2017-06-27 | Stop reason: HOSPADM

## 2017-06-27 RX ORDER — FLUMAZENIL 0.1 MG/ML
0.2 INJECTION, SOLUTION INTRAVENOUS
Status: DISCONTINUED | OUTPATIENT
Start: 2017-06-27 | End: 2017-06-27 | Stop reason: HOSPADM

## 2017-06-27 RX ORDER — FENTANYL CITRATE 50 UG/ML
INJECTION, SOLUTION INTRAMUSCULAR; INTRAVENOUS PRN
Status: DISCONTINUED | OUTPATIENT
Start: 2017-06-27 | End: 2017-06-27 | Stop reason: HOSPADM

## 2017-06-27 RX ORDER — LIDOCAINE 40 MG/G
CREAM TOPICAL
Status: DISCONTINUED | OUTPATIENT
Start: 2017-06-27 | End: 2017-06-27 | Stop reason: HOSPADM

## 2017-06-27 RX ORDER — ONDANSETRON 2 MG/ML
4 INJECTION INTRAMUSCULAR; INTRAVENOUS
Status: COMPLETED | OUTPATIENT
Start: 2017-06-27 | End: 2017-06-27

## 2017-06-27 RX ORDER — ONDANSETRON 4 MG/1
4 TABLET, ORALLY DISINTEGRATING ORAL EVERY 6 HOURS PRN
Status: DISCONTINUED | OUTPATIENT
Start: 2017-06-27 | End: 2017-06-27 | Stop reason: HOSPADM

## 2017-06-27 RX ORDER — ONDANSETRON 2 MG/ML
4 INJECTION INTRAMUSCULAR; INTRAVENOUS EVERY 6 HOURS PRN
Status: DISCONTINUED | OUTPATIENT
Start: 2017-06-27 | End: 2017-06-27 | Stop reason: HOSPADM

## 2017-06-27 RX ADMIN — ONDANSETRON 4 MG: 2 INJECTION INTRAMUSCULAR; INTRAVENOUS at 14:05

## 2017-06-27 NOTE — H&P
Pre-Endoscopy History and Physical     Teresa Brian MRN# [id number]   YOB: 1975 Age: 41 year old     Date of Procedure: 6/27/2017  Primary care provider: Anastasia Chen  Type of Endoscopy: Colonoscopy with possible biopsy, possible polypectomy and Gastroscopy with possible biopsy, possible dilation  Reason for Procedure: screen,pain  Type of Anesthesia Anticipated: Conscious Sedation    HPI:    Teresa is a 41 year old female who will be undergoing the above procedure.      A history and physical has been performed. The patient's medications and allergies have been reviewed. The risks and benefits of the procedure and the sedation options and risks were discussed with the patient.  All questions were answered and informed consent was obtained.      She denies a personal or family history of anesthesia complications or bleeding disorders.     Patient Active Problem List   Diagnosis     CARDIOVASCULAR SCREENING; LDL GOAL LESS THAN 160     BMI 34.0-34.9,adult     Menorrhagia     Personal history of cervical dysplasia     Migraine with aura and without status migrainosus, not intractable        Past Medical History:   Diagnosis Date     Asthma      CARDIOVASCULAR SCREENING; LDL GOAL LESS THAN 160      Cervical dysplasia 2010 2009-colpo @ [facility]-->JOANNA 1; persistent LGSIL/ASC-US paps; colposcopy 12/2010 with JOANNA 1, focal JOANNA 2 and benign ECC     Condyloma acuminatum 4/2009    perirecal BX      Herpes simplex virus infection     HSV-1     Menarche age 15    cycles q 3-4 x 3-4 d w cramps     Menorrhagia      Migraine      Personal history of cervical dysplasia 2010    (2009) JOANNA I; (2010) JOANNA I and focal JOANNA II        Past Surgical History:   Procedure Laterality Date     ARTHROSCOPIC RECONSTRUCTION ANTERIOR CRUCIATE LIGAMENT Right 10/2010    Reconstruction (ACL, MCL) and fibular fx     COLONOSCOPY       COLPOSCOPY CERVIX, LOOP ELECTRODE BIOPSY, COMBINED  08/2011    squamous  "metaplasia     ESOPHAGOSCOPY, GASTROSCOPY, DUODENOSCOPY (EGD), COMBINED       HC TOOTH EXTRACTION W/FORCEP  2000    wisdom teeth extraction     LAPAROSCOPY DIAGNOSTIC (GYN)  1992    negative ([facility])       Social History   Substance Use Topics     Smoking status: Never Smoker     Smokeless tobacco: Never Used     Alcohol use 1.2 oz/week     2 Standard drinks or equivalent per week      Comment: 2 drinks per week avg       Family History   Problem Relation Age of Onset     Neurologic Disorder Mother      Fibromyalgia. migraines     Neurologic Disorder Father      parkinson with early dementia     Colon Cancer No family hx of        Prior to Admission medications    Medication Sig Start Date End Date Taking? Authorizing Provider   PROPRANOLOL HCL PO Take 20 mg by mouth 2 times daily   Yes Reported, Patient   SUMAtriptan (IMITREX) 25 MG tablet Take 1-2 tablets (25-50 mg) by mouth at onset of headache for migraine May repeat in 2 hours. Max 8 tablets/24 hours. 4/24/17   Anastasia Chen PA-C   order for DME Equipment being ordered: Wrist/Thumb Brace 12/20/16   Anastasia Chen PA-C   neomycin-polymyxin-dexamethasone (MAXITROL) 3.5-31773-3.1 OINT ophthalmic ointment  11/22/16   Reported, Patient   OMEPRAZOLE PO Take 20 mg by mouth    Reported, Patient   ACETAMINOPHEN PO     Reported, Patient   ibuprofen (ADVIL,MOTRIN) 800 MG tablet Take 1 tablet by mouth every 6 hours as needed for pain. 9/10/11   Vesta Mg PA-C       Allergies   Allergen Reactions     Vicodin [Hydrocodone-Acetaminophen] Nausea and Vomiting        REVIEW OF SYSTEMS:   5 point ROS negative except as noted above in HPI, including Gen., Resp., CV, GI &  system review.    PHYSICAL EXAM:   There were no vitals taken for this visit. Estimated body mass index is 33.12 kg/(m^2) as calculated from the following:    Height as of 4/26/17: 1.651 m (5' 5\").    Weight as of 4/26/17: 90.3 kg (199 lb).   GENERAL APPEARANCE: alert, and " oriented  MENTAL STATUS: alert  AIRWAY EXAM: Mallampatti Class I (visualization of the soft palate, fauces, uvula, anterior and posterior pillars)  RESP: lungs clear to auscultation - no rales, rhonchi or wheezes  CV: regular rates and rhythm  DIAGNOSTICS:    Not indicated    IMPRESSION   ASA Class 1 - Healthy patient, no medical problems    PLAN:   Plan for Colonoscopy with possible biopsy, possible polypectomy and Gastroscopy with possible biopsy, possible dilation. We discussed the risks, benefits and alternatives and the patient wished to proceed.    The above has been forwarded to the consulting provider.      Signed Electronically by: Allan Olivier  June 27, 2017

## 2017-06-27 NOTE — LETTER
June 9, 2017      Teresa Brian  [address] [address] [address]              Dear Teresa Brian,    Thank you for choosing [facility]. You are scheduled for the following services.     Date:  6-22-17      Procedure: UPPER ENDOSCOPY & COLONOSCOPY  Doctor:  Charline      Arrival Time:   1230  *check in at Emergency/Endoscopy desk*  Procedure Time:   100  Location:   [facility]        Endoscopy Department, First Floor (Enter through ER Doors) *         [address] [place], [place] [address]      [phone] or [phone] () to reschedule      Upper Endoscopy or Esophagogastroduodenoscopy (EGD) is a test performed to evaluate symptoms of persistent abdominal pain, nausea, vomiting or difficulty swallowing. It may also be used to treat various conditions of the upper gastrointestinal (GI) tract, such as bleeding, narrowing or abnormal growths. During the procedure, a doctor examines the lining of your esophagus, stomach and the first part of your small intestine through a thin, flexible tube called an endoscope. If growths or other abnormalities are found during the procedure, the doctor may remove the abnormal tissue (biopsy) for further examination.     Colonoscopy is the most accurate test to detect colon polyps and colon cancer; and the only test where polyps can be removed. During this procedure, a doctor examines the lining of your large intestine and rectum through a flexible tube.     Transportation  Arrange for a ride for the day of your procedures with a responsible adult.  A taxi ride is not an option unless you are accompanied by a responsible adult. If you fail to arrange transportation with a responsible adult, your procedure will be cancelled and rescheduled.    MIRALAX -GATORADE  PREP    Purchase the following supplies at your local pharmacy:  - 2 (two) bisacodyl tablets: each tablet contains 5 mg.  (Dulcolax  laxative  NOT Dulcolax  stool softener)   - 1 (one) 8.3 oz bottle of Polyethylene Glycol (PEG) 3350 Powder   (MiraLAX , Smooth LAX , ClearLAX  or equivalent)  - 64 oz Gatorade    Regular Gatorade, Gatorade G2 , Powerade , Powerade Zero  or Pedialyte  is acceptable. Red colored flavors are not allowed; all other colors (yellow, green, orange, purple and blue) are okay. It is also okay to buy two 2.12 oz packets of powdered Gatorade that can be mixed with water to a total volume of 64 oz of liquid.  - 1 (one) 10 oz bottle of Magnesium Citrate (Red colored flavors are not allowed)  It is also okay for you to use a 0.5 oz package of powdered magnesium citrate (17 g) mixed with 10 oz of water.      PREPARATION FOR COLONOSCOPY  7 days before:    Discontinue fiber supplements and medications containing iron. This includes Metamucil  and Fibercon ; and multivitamins with iron.  3 days before:    Begin a low-fiber diet. A low-fiber diet helps making the cleanout more effective.     Examples of a low-fiber diet include (but are not limited to): white bread, white rice, pasta, crackers, fish, chicken, eggs, ground beef, creamy peanut butter, cooked/steamed/boiled vegetables, canned fruit, bananas, melons, milk, plain yogurt cheese, salad dressing and other condiments.     The following are not allowed on a low-fiber diet: seeds, nuts, popcorn, bran, whole wheat, corn, quinoa, raw fruits and vegetables, berries and dried fruit, beans and lentils.    For additional details on low-fiber diet, please refer to the table on the last page.  2 days before:    Continue the low-fiber diet.     Drink at least 8 glasses of water throughout the day.     Stop eating solid foods at 11:45 pm.  1 day before:    In the morning: begin a clear liquid diet (liquids you can see through).     Examples of a clear liquid diet include: water, clear broth or bouillon, Gatorade, Pedialyte or Powerade, carbonated and non-carbonated soft drinks (Sprite , 7-Up ,  ginger ale), strained fruit juices without pulp (apple, white grape, white cranberry), Jell-O  and popsicles.     The following are not allowed on a clear liquid diet: red liquids, alcoholic beverages, coffee, dairy products (milk, creamer, and yogurt), protein shakes, creamy broths, juice with pulp and chewing tobacco.    At noon: take 2 (two) bisacodyl tablets     At 4 (and no later than 6pm): start drinking the Miralax-Gatorade preparation (8.3 oz of Miralax mixed with 64 oz of Gatorade in a large pitcher). Drink 1(one) 8 oz glass every 15 minutes thereafter, until the mixture is gone.    COLON CLEANSING TIPS: drink adequate amounts of fluids before and after your colon cleansing to prevent dehydration. Stay near a toilet because you will have diarrhea. Even if you are sitting on the toilet, continue to drink the cleansing solution every 15 minutes. If you feel nauseous or vomit, rinse your mouth with water, take a 15 to 30-minute-break and then continue drinking the solution. You will be uncomfortable until the stool has flushed from your colon (in about 2 to 4 hours). You may feel chilled.    Day of your procedure  You may take all of your morning medications including blood pressure medications, blood thinners (if you have not been instructed to stop these by our office), methadone, anti-seizure medications with sips of water 3 hours prior to your procedure or earlier. Do not take insulin or vitamins prior to your procedure. Continue the clear liquid diet.   4 hours prior: drink 10 oz of magnesium citrate. It may be easier to drink it with a straw.    STOP consuming all liquids after that.     Do not take anything by mouth during this time.     Allow extra time to travel to your procedure as you may need to stop and use a restroom along the way.  You are ready for the procedure, if you followed all instructions and your stool is no longer formed, but clear or yellow liquid. If you are unsure whether your colon  is clean, please call our office at [phone] before you leave for your appointment.  Bring the following to your procedure:  - Insurance Card/Photo ID.   - List of current medications including over-the-counter medications and supplements.   - Your rescue inhaler if you currently use one to control asthma.    Canceling or rescheduling your appointment:   If you must cancel or reschedule your appointment, please call [phone] as soon as possible.    COLONOSCOPY PRE-PROCEDURE CHECKLIST  If you have diabetes, ask your regular doctor for diet and medication restrictions.  If you take an anticoagulant or anti-platelet medication (such as Coumadin , Lovenox , Pradaxa , Xarelto , Eliquis , etc.), please call your primary doctor for advice on holding this medication.  If you take aspirin you may continue to do so.  If you are or may be pregnant, please discuss the risks and benefits of this procedure with your doctor.    What happens during a colonoscopy?  Plan to spend up to two hours, starting at registration time, at the endoscopy center the day of your procedure. The colonoscopy takes an average of 15 to 30 minutes. Recovery time is about 30 minutes.     Before the exam:    You will change into a gown.    Your medical history and medication list will be reviewed with you, unless that has been done over the phone prior to the procedure.     A nurse will insert an intravenous (IV) line into your hand or arm.    The doctor will meet with you and will give you a consent form to sign.    During the exam:     Medicine will be given through the IV line to help you relax.     Your heart rate and oxygen levels will be monitored. If your blood pressure is low, you may be given fluids through the IV line.     The doctor will insert a flexible hollow tube, called a colonoscope, into your rectum. The scope will be advanced slowly through the large intestine (colon).    You may have a feeling of fullness or pressure.     If  an abnormal tissue or a polyp is found, the doctor may remove it through the endoscope for closer examination, or biopsy. Tissue removal is painless    After the exam:           Any tissue samples removed during the exam will be sent to a lab for evaluation. It may take 5-7 working days for you to be notified of the results.     A nurse will provide you with complete discharge instructions before you leave the endoscopy center. Be sure to ask the nurse for specific instructions if you take blood thinners such as Aspirin, Coumadin or Plavix.     The doctor will prepare a full report for you and for the physician who referred you for the procedure.     Your doctor will talk with you about the initial results of your exam.      Medication given during the exam will prohibit you from driving for the rest of the day.     Following the exam, you may resume your normal diet. Your first meal should be light, no greasy foods. Avoid alcohol until the next day.     You may resume your regular activities the day after the procedure.       LOW-FIBER DIET  Foods RECOMMENDED Foods to AVOID   Breads, Cereal, Rice and Pasta:   White bread, rolls, biscuits, croissant and deanna toast.   Waffles, Urdu toast and pancakes.   White rice, noodles, pasta, macaroni and peeled cooked potatoes.   Plain crackers and saltines.   Cooked cereals: farina, cream of rice.   Cold cereals: Puffed Rice , Rice Krispies , Corn Flakes  and Special K    Breads, Cereal, Rice and Pasta:   Breads or rolls with nuts, seeds or fruit.   Whole wheat, pumpernickel, rye breads and cornbread.   Potatoes with skin, brown or wild rice, and kasha (buckwheat).     Vegetables:   Tender cooked and canned vegetables without seeds: carrots, asparagus tips, green or wax beans, pumpkin, spinach, lima beans. Vegetables:   Raw or steamed vegetables (w/ or without seeds)   Sarkis.   Winter squash, peas, broccoli, Brussel sprouts, cabbage, onions, cauliflower, baked beans,  peas and corn.   Fruits:   Strained fruit juice.   Canned fruit, except pineapple.   Ripe bananas and melon. Fruits:   Prunes and prune juice.   Raw fruits.   Dried fruits: figs, dates and raisins.   Milk/Dairy:   Milk: plain or flavored; yogurt; ice cream.   Custard; cheese and cottage cheese Milk/Dairy:     Meat and other proteins:   Ground, well-cooked tender beef, lamb, ham, veal, pork, fish, poultry, organ meats and eggs.   Peanut butter without nuts. Meat and other proteins:   Tough, fibrous meats with gristle.   Dry beans and peas; lentils and Tofu   Peanut butter with nuts.   Fats, Snack, Sweets, Condiments and Beverages:   Margarine, butter, oils, mayonnaise, sour cream and salad dressing, plain gravy.   Sugar, hard candy, clear jelly, honey and syrup.   Spices, cooked herbs, bouillon, broth and soups made with allowed vegetable, ketchup and mustard.   Coffee, tea and carbonated drinks.   Plain cakes, cookies and pretzels.   Gelatin, plain puddings, custard, ice cream, sherbet and popsicles. Fats, Snack, Sweets, Condiments and Beverages:   Nuts, seeds and coconut.   Jam, marmalade and preserves.   Pickles, olives, relish and horseradish.   All desserts containing nuts, seeds, dried fruit and coconut; or made from whole grains or bran.   Candy made with nuts or seeds.   Popcorn.       DIRECTIONS TO THE ENDOSCOPY DEPARTMENT  From the north ([facility])  Take [address] south, exit on [address] [address]. Get into the left hand syed, turn left (east), go one-half mile to [address] and turn left. Go north to the first stoplight, take a right on [address] and follow it to the Emergency entrance.    From the south ([facility])  Take 35N to the 35E split and exit on [address] [address]. On [address] [address], turn left (west) to [address]. Turn right (north) on [address]. Go north to the first stoplight, take a right on [address] and follow it to the Emergency  entrance.    From the east via 35E ([facility])  Take [address]E south to [address]  exit. Turn right on [address]. Go west to [address]. Turn right (north) on [address]. Go to the first stoplight, take a right and follow on [address] to the Emergency entrance.    From the east via [address] 13 ([facility])  Take [address] West to [address]. Turn left (south) on [address] to [address]. Turn left (east) on [address] and follow it to the Emergency entrance.    From the west via [address] 13 ([place])  Take [address] east to [address]. Turn right (south) on [address] to [address]. Turn left (east) on [address] and follow it to the Emergency entrance.

## 2017-06-28 LAB — COPATH REPORT: NORMAL

## 2017-07-17 ENCOUNTER — OFFICE VISIT (OUTPATIENT)
Dept: FAMILY MEDICINE | Facility: CLINIC | Age: 42
End: 2017-07-17
Payer: COMMERCIAL

## 2017-07-17 VITALS
HEART RATE: 79 BPM | WEIGHT: 201 LBS | TEMPERATURE: 98.7 F | SYSTOLIC BLOOD PRESSURE: 116 MMHG | BODY MASS INDEX: 33.49 KG/M2 | DIASTOLIC BLOOD PRESSURE: 82 MMHG | OXYGEN SATURATION: 98 % | HEIGHT: 65 IN

## 2017-07-17 DIAGNOSIS — R10.13 EPIGASTRIC PAIN: Primary | ICD-10-CM

## 2017-07-17 DIAGNOSIS — R19.5 LOOSE STOOLS: ICD-10-CM

## 2017-07-17 DIAGNOSIS — K59.00 CONSTIPATION, UNSPECIFIED CONSTIPATION TYPE: ICD-10-CM

## 2017-07-17 PROCEDURE — 99214 OFFICE O/P EST MOD 30 MIN: CPT | Performed by: PHYSICIAN ASSISTANT

## 2017-07-17 NOTE — MR AVS SNAPSHOT
After Visit Summary   7/17/2017    Teresa Brian    MRN: [id number]           Patient Information     Date Of Birth          1975        Visit Information        Provider Department      7/17/2017 4:40 PM Anastasia Chen PA-C [facility] [place]        Today's Diagnoses     Epigastric pain    -  1    Loose stools        Constipation, unspecified constipation type          Care Instructions    Given persistent epigastric pain despite PPI therapy, normal abdominal US/EGD/colonoscopy discussed further eval with abdominal CT to ensure no other sinister pathology and pt in agreement with plan.  Some question of effective medication trial given PPI therapy only for 1 month and miralax only for 2 weeks.  Would re-start until see GI for additional consult as perhaps some further benefit may be derived from this.    Electronically Signed By: Anastasia Chen PA-C            Follow-ups after your visit        Additional Services     GASTROENTEROLOGY ADULT REF CONSULT ONLY       Preferred Location: [facility], [facility] [phone] and [place] [facility] [phone]      Please be aware that coverage of these services is subject to the terms and limitations of your health insurance plan.  Call member services at your health plan with any benefit or coverage questions.  Any procedures must be performed at a [place] facility OR coordinated by your clinic's referral office.    Please bring the following with you to your appointment:    (1) Any X-Rays, CTs or MRIs which have been performed.  Contact the facility where they were done to arrange for  prior to your scheduled appointment.    (2) List of current medications   (3) This referral request   (4) Any documents/labs given to you for this referral                  Your next 10 appointments already scheduled     Nov 30, 2017  3:00 PM CST   PHYSICAL with Daniella Duncan MD   [facility] ([facility]  "   [address] [id number] [phone]              Who to contact     If you have questions or need follow up information about today's clinic visit or your schedule please contact [facility] [place] directly at [phone].  Normal or non-critical lab and imaging results will be communicated to you by MyChart, letter or phone within 4 business days after the clinic has received the results. If you do not hear from us within 7 days, please contact the clinic through [employer] or phone. If you have a critical or abnormal lab result, we will notify you by phone as soon as possible.  Submit refill requests through [employer] or call your pharmacy and they will forward the refill request to us. Please allow 3 business days for your refill to be completed.          Additional Information About Your Visit        [employer] Information     [employer] gives you secure access to your electronic health record. If you see a primary care provider, you can also send messages to your care team and make appointments. If you have questions, please call your primary care clinic.  If you do not have a primary care provider, please call [phone] and they will assist you.        Care EveryWhere ID     This is your Care EveryWhere ID. This could be used by other organizations to access your [facility] medical records  [id number]        Your Vitals Were     Pulse Temperature Height Pulse Oximetry Breastfeeding? BMI (Body Mass Index)    79 98.7  F (37.1  C) (Oral) 5' 5\" (1.651 m) 98% No 33.45 kg/m2       Blood Pressure from Last 3 Encounters:   07/17/17 116/82   06/27/17 132/84   04/26/17 108/78    Weight from Last 3 Encounters:   07/17/17 201 lb (91.2 kg)   06/27/17 195 lb (88.5 kg)   04/26/17 199 lb (90.3 kg)              We Performed the Following     GASTROENTEROLOGY ADULT REF CONSULT ONLY          Today's Medication Changes          These changes are accurate as of: 7/17/17  5:33 PM.  If you have " any questions, ask your nurse or doctor.               Stop taking these medicines if you haven't already. Please contact your care team if you have questions.     ibuprofen 800 MG tablet   Commonly known as:  ADVIL/MOTRIN   Stopped by:  Anastasia Chen PA-C           neomycin-polymyxin-dexamethasone 3.5-89747-1.1 Oint ophthalmic ointment   Commonly known as:  MAXITROL   Stopped by:  Anastasia Chen PA-C           order for DME   Stopped by:  Anastasia Chen PA-C                    Primary Care Provider Office Phone # Fax #    Anastasia Chen PA-C [phone]       [facility] [address] [address]        Equal Access to Services     SOHAN SEAY : Hadii umu jj hadasho Soomaali, waaxda luqadaha, qaybta kaalmada adeegyada, waxay janett haymelisa moise . So [facility] [phone].    ATENCIÓN: Si habla español, tiene a alonzo disposición servicios gratuitos de asistencia lingüística. Llame al [phone].    We comply with applicable federal civil rights laws and [place] laws. We do not discriminate on the basis of race, color, national origin, age, disability sex, sexual orientation or gender identity.            Thank you!     Thank you for choosing [facility]  for your care. Our goal is always to provide you with excellent care. Hearing back from our patients is one way we can continue to improve our services. Please take a few minutes to complete the written survey that you may receive in the mail after your visit with us. Thank you!             Your Updated Medication List - Protect others around you: Learn how to safely use, store and throw away your medicines at www.disposemymeds.org.          This list is accurate as of: 7/17/17  5:33 PM.  Always use your most recent med list.                   Brand Name Dispense Instructions for use Diagnosis    ACETAMINOPHEN PO           OMEPRAZOLE PO      Take 20 mg  by mouth        PROPRANOLOL HCL PO      Take 20 mg by mouth 2 times daily        SUMAtriptan 25 MG tablet    IMITREX    9 tablet    Take 1-2 tablets (25-50 mg) by mouth at onset of headache for migraine May repeat in 2 hours. Max 8 tablets/24 hours.    Migraine with aura and without status migrainosus, not intractable

## 2017-07-17 NOTE — PATIENT INSTRUCTIONS
Given persistent epigastric pain despite PPI therapy, normal abdominal US/EGD/colonoscopy discussed further eval with abdominal CT to ensure no other sinister pathology and pt in agreement with plan.  Some question of effective medication trial given PPI therapy only for 1 month and miralax only for 2 weeks.  Would re-start until see GI for additional consult as perhaps some further benefit may be derived from this.    Electronically Signed By: Anastasia Chen PA-C

## 2017-07-17 NOTE — Clinical Note
Ni, can you call Saniya and notify her that CT orders were placed and help her schedule an appt for this if needed. I can never remember if they call her or not. Electronically Signed By: Anastasia Chen PA-C

## 2017-07-17 NOTE — PROGRESS NOTES
SUBJECTIVE:                                                    Teresa Brian is a 41 year old female who presents to clinic today for the following health issues:      Followup of continued stomach issues.  Has done an ultrasound, colonoscopy and endoscopy and have found nothing.  Continues to suffer with epigastric pain.  Estimates she's bothered by this at least 75% of the time.  Currently rates as 4/10.   Eats because she has to eat, but will experience early satiety.  Continues to have days of loose stools, but then days of constipation.  Stopped ibuprofen (admits rarely will take ASA, but no other NSAIDs) and has tried to watch gluten or dairy in diet.  Has tried to pay attention to see if pain is triggered by anything specific and it doesn't seem to be. Doesn't seem to matter what she eats.    Took omeprazole for a month with up to 40mg daily dosing, but didn't feel it made a difference when she was on it.  Occasionally will need to cough to clear her throat. Attributes this to allergies though.     Sees OB/GYN for routine female health - last pap/pelvic was 2 yrs ago. Denies low abdominal pain.    Has not had abdominal CT or see GI yet.    Denies any significant social issues.   Job is stressful, but she enjoys it. 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   Loves her boyfriend and gets along well with him.       Problem list and histories reviewed & adjusted, as indicated.  Additional history: as documented    Patient Active Problem List   Diagnosis     CARDIOVASCULAR SCREENING; LDL GOAL LESS THAN 160     BMI 34.0-34.9,adult     Menorrhagia     Personal history of cervical dysplasia     Migraine with aura and without status migrainosus, not intractable     Past Surgical History:   Procedure Laterality Date     ARTHROSCOPIC RECONSTRUCTION ANTERIOR CRUCIATE LIGAMENT Right 10/2010     Reconstruction (ACL, MCL) and fibular fx     COLONOSCOPY       COLONOSCOPY N/A 6/27/2017    Procedure: COLONOSCOPY;;  Surgeon: Allan Olivier MD;  Location:  GI     COLPOSCOPY CERVIX, LOOP ELECTRODE BIOPSY, COMBINED  08/2011    squamous metaplasia     ESOPHAGOSCOPY, GASTROSCOPY, DUODENOSCOPY (EGD), COMBINED       ESOPHAGOSCOPY, GASTROSCOPY, DUODENOSCOPY (EGD), COMBINED N/A 6/27/2017    Procedure: COMBINED ESOPHAGOSCOPY, GASTROSCOPY, DUODENOSCOPY (EGD), BIOPSY SINGLE OR MULTIPLE;  ESOPHAGOSCOPY, GASTROSCOPY, DUODENOSCOPY (EGD with biopsies by cold forcep, Colonoscopy with biopsies by cold forcep.;  Surgeon: Allan Olivier MD;  Location:  GI     HC TOOTH EXTRACTION W/FORCEP  2000    wisdom teeth extraction     LAPAROSCOPY DIAGNOSTIC (GYN)  1992    negative ([facility])       Social History   Substance Use Topics     Smoking status: Never Smoker     Smokeless tobacco: Never Used     Alcohol use 1.2 oz/week     2 Standard drinks or equivalent per week      Comment: 2 drinks per week avg     Family History   Problem Relation Age of Onset     Neurologic Disorder Mother      Fibromyalgia. migraines     Neurologic Disorder Father      parkinson with early dementia     Colon Cancer No family hx of          Current Outpatient Prescriptions   Medication Sig Dispense Refill     PROPRANOLOL HCL PO Take 20 mg by mouth 2 times daily       SUMAtriptan (IMITREX) 25 MG tablet Take 1-2 tablets (25-50 mg) by mouth at onset of headache for migraine May repeat in 2 hours. Max 8 tablets/24 hours. 9 tablet 1     OMEPRAZOLE PO Take 20 mg by mouth       ACETAMINOPHEN PO        Allergies   Allergen Reactions     Vicodin [Hydrocodone-Acetaminophen] Nausea and Vomiting       Reviewed and updated as needed this visit by clinical staff       Reviewed and updated as needed this visit by Provider         ROS:  Constitutional, HEENT, cardiovascular, pulmonary, gi and gu systems are negative, except as otherwise noted.    OBJECTIVE:     BP  "116/82  Pulse 79  Temp 98.7  F (37.1  C) (Oral)  Ht 5' 5\" (1.651 m)  Wt 201 lb (91.2 kg)  SpO2 98%  Breastfeeding? No  BMI 33.45 kg/m2  Body mass index is 33.45 kg/(m^2).  GENERAL: healthy, alert and no distress  ABDOMEN: + BS, continues to have tenderness in epigastric region which she does report radiates bilaterally to RUQ and LUQ. Initially had increase in RUQ tenderness, but this was no reproducible on further palpation. Negative Triana's sign.    Diagnostic Test Results:  none     ASSESSMENT/PLAN:       ICD-10-CM    1. Epigastric pain R10.13 GASTROENTEROLOGY ADULT REF CONSULT ONLY     CT Abdomen Pelvis w/o & w Contrast   2. Loose stools R19.5 GASTROENTEROLOGY ADULT REF CONSULT ONLY   3. Constipation, unspecified constipation type K59.00 GASTROENTEROLOGY ADULT REF CONSULT ONLY   See Patient Instructions  Encouraged to re-start PPI and miralax as some question chronic underlying constipation may be contributing.  DDx atypical presentation of cholecystitis verus IBS versus food intolerance versus other.  Would proceed as noted below and defer next best treatment plan to GI.  Pt in agreement with plan.  Patient Instructions   Given persistent epigastric pain despite PPI therapy, normal abdominal US/EGD/colonoscopy discussed further eval with abdominal CT to ensure no other sinister pathology and pt in agreement with plan.  Some question of effective medication trial given PPI therapy only for 1 month and miralax only for 2 weeks.  Would re-start until see GI for additional consult as perhaps some further benefit may be derived from this.    Electronically Signed By: Anastasia Chen PA-C            "

## 2017-07-17 NOTE — NURSING NOTE
"Chief Complaint   Patient presents with     Abdominal Pain     still having stomach issues       Initial /82  Pulse 79  Temp 98.7  F (37.1  C) (Oral)  Ht 5' 5\" (1.651 m)  Wt 201 lb (91.2 kg)  SpO2 98%  Breastfeeding? No  BMI 33.45 kg/m2 Estimated body mass index is 33.45 kg/(m^2) as calculated from the following:    Height as of this encounter: 5' 5\" (1.651 m).    Weight as of this encounter: 201 lb (91.2 kg).  Medication Reconciliation: complete    "

## 2017-07-27 ENCOUNTER — HOSPITAL ENCOUNTER (OUTPATIENT)
Dept: CT IMAGING | Facility: CLINIC | Age: 42
Discharge: HOME | End: 2017-07-27
Attending: PHYSICIAN ASSISTANT | Admitting: PHYSICIAN ASSISTANT
Payer: COMMERCIAL

## 2017-07-27 DIAGNOSIS — R10.13 EPIGASTRIC PAIN: ICD-10-CM

## 2017-07-27 PROCEDURE — 25000128 H RX IP 250 OP 636: Performed by: RADIOLOGY

## 2017-07-27 PROCEDURE — 74177 CT ABD & PELVIS W/CONTRAST: CPT

## 2017-07-27 RX ORDER — IOPAMIDOL 755 MG/ML
500 INJECTION, SOLUTION INTRAVASCULAR ONCE
Status: COMPLETED | OUTPATIENT
Start: 2017-07-27 | End: 2017-07-27

## 2017-07-27 RX ADMIN — SODIUM CHLORIDE 55 ML: 9 INJECTION, SOLUTION INTRAVENOUS at 08:50

## 2017-07-27 RX ADMIN — IOPAMIDOL 100 ML: 755 INJECTION, SOLUTION INTRAVENOUS at 08:50

## 2017-07-27 NOTE — LETTER
Teresa SURAJ Roc  [address] [facility] [address]        July 31, 2017          Dear ,    We are writing to inform you of your test results.    Reassuringly your abdominal CT scan was normal :) Please follow-up with GI for consult as previously discussed in clinic    Resulted Orders   CT Abdomen Pelvis w Contrast    Narrative    CT ABDOMEN AND PELVIS WITH CONTRAST   7/27/2017 9:06 AM     HISTORY: Epigastric pain. Evaluate for abdominal mass.    COMPARISON: 4/10/2017 - Ultrasound right upper quadrant.    TECHNIQUE: Following the uneventful administration of 100 mL  Isovue-370 intravenous contrast and oral contrast, helical sections  were acquired from the top of the diaphragm through the pubic  symphysis. Coronal reconstructions were generated. Radiation dose for  this scan was reduced using automated exposure control, adjustment of  the mA and/or kV according to the patient's size, or iterative  reconstruction technique.    FINDINGS:     Abdomen: A subcentimeter low-attenuation lesion in the lateral segment  of the left lobe of the liver, too small to characterize. The spleen,  pancreas, adrenal glands and kidneys are unremarkable. The gallbladder  is present. No enlarged lymph nodes or free fluid in the upper  abdomen.    Scan through the lower chest is unremarkable.    Pelvis: The small and large bowel are normal in caliber. The appendix  is unremarkable. No bowel wall thickening, pneumatosis or free  intraperitoneal gas. The uterus is present. No enlarged lymph nodes or  free fluid in the pelvis.      Impression    IMPRESSION:   1. No cause of acute pain identified in the abdomen or pelvis.  2. No abnormal mass is visualized in the abdomen or pelvis.    TAISHA AHUJA MD       If you have any questions or concerns, please call the clinic at the number listed above.       Sincerely,        Anastasia Chen PA-C

## 2017-07-29 NOTE — PROGRESS NOTES
Please call or write patient with the following results:    -Reassuringly your abdominal CT scan was normal :) Please follow-up with GI for consult as previously discussed in clinic.    Electronically Signed By: Anastasia Chen PA-C

## 2017-09-06 ENCOUNTER — OFFICE VISIT (OUTPATIENT)
Dept: FAMILY MEDICINE | Facility: CLINIC | Age: 42
End: 2017-09-06
Payer: COMMERCIAL

## 2017-09-06 VITALS
HEART RATE: 75 BPM | SYSTOLIC BLOOD PRESSURE: 118 MMHG | WEIGHT: 207 LBS | HEIGHT: 65 IN | DIASTOLIC BLOOD PRESSURE: 78 MMHG | OXYGEN SATURATION: 98 % | TEMPERATURE: 98.4 F | BODY MASS INDEX: 34.49 KG/M2

## 2017-09-06 DIAGNOSIS — E66.09 NON MORBID OBESITY DUE TO EXCESS CALORIES: ICD-10-CM

## 2017-09-06 DIAGNOSIS — H26.9 CATARACT OF LEFT EYE, UNSPECIFIED CATARACT TYPE: ICD-10-CM

## 2017-09-06 DIAGNOSIS — Z01.818 PREOP GENERAL PHYSICAL EXAM: Primary | ICD-10-CM

## 2017-09-06 DIAGNOSIS — Z23 NEED FOR INFLUENZA VACCINATION: ICD-10-CM

## 2017-09-06 PROCEDURE — 99214 OFFICE O/P EST MOD 30 MIN: CPT | Performed by: PHYSICIAN ASSISTANT

## 2017-09-06 NOTE — NURSING NOTE
"Chief Complaint   Patient presents with     Pre-Op Exam       Initial Pulse 75  Temp 98.4  F (36.9  C) (Oral)  Ht 5' 5\" (1.651 m)  Wt 207 lb (93.9 kg)  SpO2 98%  BMI 34.45 kg/m2 Estimated body mass index is 34.45 kg/(m^2) as calculated from the following:    Height as of this encounter: 5' 5\" (1.651 m).    Weight as of this encounter: 207 lb (93.9 kg).  Medication Reconciliation: complete    "

## 2017-09-06 NOTE — MR AVS SNAPSHOT
After Visit Summary   9/6/2017    Teresa Brian    MRN: [id number]           Patient Information     Date Of Birth          1975        Visit Information        Provider Department      9/6/2017 2:20 PM Anastasia Chen PA-C [facility]        Today's Diagnoses     Preop general physical exam    -  1    Cataract of left eye, unspecified cataract type        Need for influenza vaccination        Non morbid obesity due to excess calories          Care Instructions      Before Your Surgery      Call your surgeon if there is any change in your health. This includes signs of a cold or flu (such as a sore throat, runny nose, cough, rash or fever).    Do not smoke, drink alcohol or take over the counter medicine (unless your surgeon or primary care doctor tells you to) for the 24 hours before and after surgery.    If you take prescribed drugs: Follow your doctor s orders about which medicines to take and which to stop until after surgery.    Eating and drinking prior to surgery: follow the instructions from your surgeon    Take a shower or bath the night before surgery. Use the soap your surgeon gave you to gently clean your skin. If you do not have soap from your surgeon, use your regular soap. Do not shave or scrub the surgery site.  Wear clean pajamas and have clean sheets on your bed.           Follow-ups after your visit        Your next 10 appointments already scheduled     Nov 30, 2017  3:00 PM CST   PHYSICAL with Daniella Duncan MD   [facility] ([facility])    [address] [place] [id number] [phone]              Who to contact     If you have questions or need follow up information about today's clinic visit or your schedule please contact [facility] directly at [phone].  Normal or non-critical lab and imaging results will be communicated to you by MyChart, letter or phone  "within 4 business days after the clinic has received the results. If you do not hear from us within 7 days, please contact the clinic through [employer] or phone. If you have a critical or abnormal lab result, we will notify you by phone as soon as possible.  Submit refill requests through [employer] or call your pharmacy and they will forward the refill request to us. Please allow 3 business days for your refill to be completed.          Additional Information About Your Visit        [facility] Information     [employer] gives you secure access to your electronic health record. If you see a primary care provider, you can also send messages to your care team and make appointments. If you have questions, please call your primary care clinic.  If you do not have a primary care provider, please call [phone] and they will assist you.        Care EveryWhere ID     This is your Care EveryWhere ID. This could be used by other organizations to access your [place] medical records  [id number]        Your Vitals Were     Pulse Temperature Height Pulse Oximetry BMI (Body Mass Index)       75 98.4  F (36.9  C) (Oral) 5' 5\" (1.651 m) 98% 34.45 kg/m2        Blood Pressure from Last 3 Encounters:   09/06/17 118/78   07/17/17 116/82   06/27/17 132/84    Weight from Last 3 Encounters:   09/06/17 207 lb (93.9 kg)   07/17/17 201 lb (91.2 kg)   06/27/17 195 lb (88.5 kg)              Today, you had the following     No orders found for display       Primary Care Provider Office Phone # Fax #    Anastasia Chen PA-C [phone] [address] [facility] [address]        Equal Access to Services     [facility] Center[facility] : Hadii umu smith Somojgan, waaxda luqadaha, qaybta kaalmada jayden, arya moise . So [facility] [phone].    ATENCIÓN: Si habla español, tiene a alonzo disposición servicios gratuitos de asistencia lingüística. Llame al [phone].    We comply with applicable federal civil " rights laws and [place] laws. We do not discriminate on the basis of race, color, national origin, age, disability sex, sexual orientation or gender identity.            Thank you!     Thank you for choosing [facility]  for your care. Our goal is always to provide you with excellent care. Hearing back from our patients is one way we can continue to improve our services. Please take a few minutes to complete the written survey that you may receive in the mail after your visit with us. Thank you!             Your Updated Medication List - Protect others around you: Learn how to safely use, store and throw away your medicines at www.disposemymeds.org.          This list is accurate as of: 9/6/17 11:59 PM.  Always use your most recent med list.                   Brand Name Dispense Instructions for use Diagnosis    ACETAMINOPHEN PO           OMEPRAZOLE PO      Take 20 mg by mouth        PROPRANOLOL HCL PO      Take 20 mg by mouth 2 times daily        SUMAtriptan 25 MG tablet    IMITREX    9 tablet    Take 1-2 tablets (25-50 mg) by mouth at onset of headache for migraine May repeat in 2 hours. Max 8 tablets/24 hours.    Migraine with aura and without status migrainosus, not intractable

## 2017-09-07 PROBLEM — E66.09 NON MORBID OBESITY DUE TO EXCESS CALORIES: Status: ACTIVE | Noted: 2017-09-07

## 2017-10-24 ENCOUNTER — OFFICE VISIT (OUTPATIENT)
Dept: FAMILY MEDICINE | Facility: CLINIC | Age: 42
End: 2017-10-24
Payer: COMMERCIAL

## 2017-10-24 ENCOUNTER — RADIANT APPOINTMENT (OUTPATIENT)
Dept: GENERAL RADIOLOGY | Facility: CLINIC | Age: 42
End: 2017-10-24
Attending: PHYSICIAN ASSISTANT
Payer: COMMERCIAL

## 2017-10-24 VITALS
WEIGHT: 205 LBS | SYSTOLIC BLOOD PRESSURE: 124 MMHG | OXYGEN SATURATION: 99 % | TEMPERATURE: 97.8 F | HEIGHT: 65 IN | BODY MASS INDEX: 34.16 KG/M2 | DIASTOLIC BLOOD PRESSURE: 84 MMHG | HEART RATE: 84 BPM
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
## 2017-10-24 DIAGNOSIS — Z23 NEED FOR TDAP VACCINATION: ICD-10-CM

## 2017-10-24 DIAGNOSIS — M79.644 THUMB PAIN, RIGHT: ICD-10-CM

## 2017-10-24 LAB
BASOPHILS # BLD AUTO: 0 10E9/L (ref 0–0.2)
BASOPHILS NFR BLD AUTO: 0.2 %
DIFFERENTIAL METHOD BLD: NORMAL
EOSINOPHIL # BLD AUTO: 0.5 10E9/L (ref 0–0.7)
EOSINOPHIL NFR BLD AUTO: 5.1 %
ERYTHROCYTE [DISTWIDTH] IN BLOOD BY AUTOMATED COUNT: 12.2 % (ref 10–15)
ERYTHROCYTE [SEDIMENTATION RATE] IN BLOOD BY WESTERGREN METHOD: 10 MM/H (ref 0–20)
HCT VFR BLD AUTO: 42.8 % (ref 35–47)
HGB BLD-MCNC: 14.6 G/DL (ref 11.7–15.7)
LYMPHOCYTES # BLD AUTO: 2.5 10E9/L (ref 0.8–5.3)
LYMPHOCYTES NFR BLD AUTO: 28.6 %
MCH RBC QN AUTO: 30.6 PG (ref 26.5–33)
MCHC RBC AUTO-ENTMCNC: 34.1 G/DL (ref 31.5–36.5)
MCV RBC AUTO: 90 FL (ref 78–100)
MONOCYTES # BLD AUTO: 0.7 10E9/L (ref 0–1.3)
MONOCYTES NFR BLD AUTO: 7.8 %
NEUTROPHILS # BLD AUTO: 5.1 10E9/L (ref 1.6–8.3)
NEUTROPHILS NFR BLD AUTO: 58.3 %
PLATELET # BLD AUTO: 222 10E9/L (ref 150–450)
RBC # BLD AUTO: 4.77 10E12/L (ref 3.8–5.2)
WBC # BLD AUTO: 8.8 10E9/L (ref 4–11)

## 2017-10-24 PROCEDURE — 90471 IMMUNIZATION ADMIN: CPT | Performed by: PHYSICIAN ASSISTANT

## 2017-10-24 PROCEDURE — 90715 TDAP VACCINE 7 YRS/> IM: CPT | Performed by: PHYSICIAN ASSISTANT

## 2017-10-24 PROCEDURE — 36415 COLL VENOUS BLD VENIPUNCTURE: CPT | Performed by: PHYSICIAN ASSISTANT

## 2017-10-24 PROCEDURE — 86140 C-REACTIVE PROTEIN: CPT | Performed by: PHYSICIAN ASSISTANT

## 2017-10-24 PROCEDURE — 73130 X-RAY EXAM OF HAND: CPT | Mod: RT

## 2017-10-24 PROCEDURE — 99213 OFFICE O/P EST LOW 20 MIN: CPT | Mod: 25 | Performed by: PHYSICIAN ASSISTANT

## 2017-10-24 PROCEDURE — 85652 RBC SED RATE AUTOMATED: CPT | Performed by: PHYSICIAN ASSISTANT

## 2017-10-24 PROCEDURE — 85025 COMPLETE CBC W/AUTO DIFF WBC: CPT | Performed by: PHYSICIAN ASSISTANT

## 2017-10-24 NOTE — PATIENT INSTRUCTIONS
Unclear if related to recent nail wound or not.  Reassuring this is not in exact same vicinity of injury.  Did complete x-ray, inflammatory markers and WBC just in case and these were all reassuring.  Thumb pain may be more due to over-use with active job due to tendonitis.  Rest, ice, and consideration to thumb spica splint. NSAIDs use caution.  Re-check if not improving or concerns.    Electronically Signed By: Anastasia Chen PA-C

## 2017-10-24 NOTE — LETTER
October 27, 2017      Teresa Brian  [address] [facility] [address]        Dear ,    We are writing to inform you of your test results.    CRP (other inflammatory marker) and final x-ray report by radiology were normal as well. Follow-up as discussed in clinic.     Resulted Orders   ESR: Erythrocyte sedimentation rate   Result Value Ref Range    Sed Rate 10 0 - 20 mm/h   CRP, inflammation   Result Value Ref Range    CRP Inflammation <2.9 0.0 - 8.0 mg/L   CBC with platelets and differential   Result Value Ref Range    WBC 8.8 4.0 - 11.0 10e9/L    RBC Count 4.77 3.8 - 5.2 10e12/L    Hemoglobin 14.6 11.7 - 15.7 g/dL    Hematocrit 42.8 35.0 - 47.0 %    MCV 90 78 - 100 fl    MCH 30.6 26.5 - 33.0 pg    MCHC 34.1 31.5 - 36.5 g/dL    RDW 12.2 10.0 - 15.0 %    Platelet Count 222 150 - 450 10e9/L    Diff Method Automated Method     % Neutrophils 58.3 %    % Lymphocytes 28.6 %    % Monocytes 7.8 %    % Eosinophils 5.1 %    % Basophils 0.2 %    Absolute Neutrophil 5.1 1.6 - 8.3 10e9/L    Absolute Lymphocytes 2.5 0.8 - 5.3 10e9/L    Absolute Monocytes 0.7 0.0 - 1.3 10e9/L    Absolute Eosinophils 0.5 0.0 - 0.7 10e9/L    Absolute Basophils 0.0 0.0 - 0.2 10e9/L       If you have any questions or concerns, please call the clinic at the number listed above.       Sincerely,        Anastasia Chen PA-C

## 2017-10-24 NOTE — PROGRESS NOTES
"  SUBJECTIVE:   Teresa Brian is a 42 year old female who presents to clinic today for the following health issues:    Concern - Torin nail went into palmar aspect of R hand 10 days ago; was using a \"baking nail\" that she was pushing through a potato and came through potato striking hand near crease of thenar eminence.   Admits she had been drinking alcohol that night.  Cleaned it and had been healing and bothering her less as time went on.  Estimates nail was \"no different than a typical ace nail\" and is not sure the depth of the wound.  A few days after sustaining injury though started to notice that her dorsal R thumb was aching in the morning - draws from IPJ to CMCJ. Usually improves as the day goes on, but the past 2 days pain has lasted all day long.  Rates pain as 2-3/10.  No drainage from wound, redness or swelling.  No fevers.  R-handed and is her dominant side. Could be at risk for over-use type of issue on this side, but has never had any problems before injury.    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 October isn't more busy than usual.     Continues to avoid NSAIDs due to her GI issues. Awaiting GI consult. Appt scheduled for Jan.    Onset: about 10 days ago    Description:   Torin nail punctured her hand about 10 days ago.  Needs an updated Tetanus shot - has some ongoing thumb pain    Intensity:     Progression of Symptoms:  same    Accompanying Signs & Symptoms:      Previous history of similar problem:       Precipitating factors:   Worsened by: morning stiffness    Alleviating factors:  Improved by: usually improves as day goes on, but less in the past 2 days.     Therapies Tried and outcome: none      Problem list and histories reviewed & adjusted, as indicated.  Additional history: as documented    Patient Active Problem List   Diagnosis     CARDIOVASCULAR SCREENING; LDL GOAL LESS THAN 160     BMI 34.0-34.9,adult     Menorrhagia     Personal history " of cervical dysplasia     Migraine with aura and without status migrainosus, not intractable     Non morbid obesity due to excess calories     Past Surgical History:   Procedure Laterality Date     ARTHROSCOPIC RECONSTRUCTION ANTERIOR CRUCIATE LIGAMENT Right 10/2010    Reconstruction (ACL, MCL) and fibular fx     COLONOSCOPY       COLONOSCOPY N/A 6/27/2017    Procedure: COLONOSCOPY;;  Surgeon: Allan Olivier MD;  Location:  GI     COLPOSCOPY CERVIX, LOOP ELECTRODE BIOPSY, COMBINED  08/2011    squamous metaplasia     ESOPHAGOSCOPY, GASTROSCOPY, DUODENOSCOPY (EGD), COMBINED       ESOPHAGOSCOPY, GASTROSCOPY, DUODENOSCOPY (EGD), COMBINED N/A 6/27/2017    Procedure: COMBINED ESOPHAGOSCOPY, GASTROSCOPY, DUODENOSCOPY (EGD), BIOPSY SINGLE OR MULTIPLE;  ESOPHAGOSCOPY, GASTROSCOPY, DUODENOSCOPY (EGD with biopsies by cold forcep, Colonoscopy with biopsies by cold forcep.;  Surgeon: Allan Olivier MD;  Location:  GI     HC TOOTH EXTRACTION W/FORCEP  2000    wisdom teeth extraction     LAPAROSCOPY DIAGNOSTIC (GYN)  1992    negative ([facility])       Social History   Substance Use Topics     Smoking status: Never Smoker     Smokeless tobacco: Never Used     Alcohol use 1.2 oz/week     2 Standard drinks or equivalent per week      Comment: 2 drinks per week avg     Family History   Problem Relation Age of Onset     Neurologic Disorder Mother      Fibromyalgia. migraines     Neurologic Disorder Father      parkinson with early dementia     Colon Cancer No family hx of          Current Outpatient Prescriptions   Medication Sig Dispense Refill     PROPRANOLOL HCL PO Take 20 mg by mouth 2 times daily       SUMAtriptan (IMITREX) 25 MG tablet Take 1-2 tablets (25-50 mg) by mouth at onset of headache for migraine May repeat in 2 hours. Max 8 tablets/24 hours. 9 tablet 1     OMEPRAZOLE PO Take 20 mg by mouth       ACETAMINOPHEN PO        Allergies   Allergen Reactions     Vicodin [Hydrocodone-Acetaminophen] Nausea and Vomiting  "      Reviewed and updated as needed this visit by clinical staff     Reviewed and updated as needed this visit by Provider         ROS:  Constitutional, MSK,, gi and gu, integumentary systems are negative, except as otherwise noted.      OBJECTIVE:   /84  Pulse 84  Temp 97.8  F (36.6  C) (Oral)  Ht 5' 5\" (1.651 m)  Wt 205 lb (93 kg)  SpO2 99%  Breastfeeding? No  BMI 34.11 kg/m2  Body mass index is 34.11 kg/(m^2).  GENERAL: healthy, alert and no distress  MS: pt points to 1mm flesh-colored circular dot just adjacent to R crease of thenar eminence of her R hand as penetrating site of nail. There is no redness, warmth, discharge or any appreciable fluctuance or induration noted here. Slight scale c/w healing. Denies any significant TTP of this site. Instead area of pain is along dorsal aspect of R thumb from IPJ to CMCJ. Has point TTP over thumb extensor tendons just proximal to CMCJ and pain incited with flexing thumb. No redness, warmth, or crepitus seen here. Normal  strength and FROM of R thumb. Pain with finkelstein's testing, but not worse than with general flexion of thumb.     Diagnostic Test Results:  ESR - normal  CBC - normal  Xray - no appreciable FB, bony irregularity of evidence for osteomyelitis of R hand. Will await final reading by radiology.    ASSESSMENT/PLAN:       ICD-10-CM    1. Injury by nail, initial encounter W45.0XXA XR Hand Right G/E 3 Views     ESR: Erythrocyte sedimentation rate     CRP, inflammation     CBC with platelets and differential   2. Thumb pain, right M79.644 ESR: Erythrocyte sedimentation rate     CRP, inflammation     CBC with platelets and differential   3. Need for Tdap vaccination Z23 TDAP VACCINE (ADACEL)   ? If R thumb pain related to penetrating nail injury.   No evidence for FB or osteomyelitis on x-ray.  Inflammatory markers and CBC normal as well.  Did update tetanus.  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  Consider repeat labs/imaging or ortho consult if not improving as expected.  Pt in agreement with plan.  See Patient Instructions  Patient Instructions   Unclear if related to recent nail wound or not.  Reassuring this is not in exact same vicinity of injury.  Did complete x-ray, inflammatory markers and WBC just in case and these were all reassuring.  Thumb pain may be more due to over-use with active job due to tendonitis.  Rest, ice, and consideration to thumb spica splint. NSAIDs use caution.  Re-check if not improving or concerns.    Electronically Signed By: Anastasia Chen PA-C

## 2017-10-24 NOTE — MR AVS SNAPSHOT
After Visit Summary   10/24/2017    Teresa Brian    MRN: [id number]           Patient Information     Date Of Birth          1975        Visit Information        Provider Department      10/24/2017 3:00 PM Anastasia Chen PA-C [facility] [place]        Today's Diagnoses     Injury by nail, initial encounter    -  1    Thumb pain, right        Need for Tdap vaccination          Care Instructions    Unclear if related to recent nail wound or not.  Reassuring this is not in exact same vicinity of injury.  Did complete x-ray, inflammatory markers and WBC just in case and these were all reassuring.  Thumb pain may be more due to over-use with active job due to tendonitis.  Rest, ice, and consideration to thumb spica splint. NSAIDs use caution.  Re-check if not improving or concerns.    Electronically Signed By: Anastasia Chen PA-C            Follow-ups after your visit        Your next 10 appointments already scheduled     Nov 30, 2017  3:00 PM CST   [facility]    [address] [place] [id number] [phone]            Dec 20, 2017  7:00 AM CST   (Arrive by 6:45 AM)   New Patient Visit with Randy Palacios PA-C   [facility] ([facility])    [address] [place] [id number] [phone]              Who to contact     If you have questions or need follow up information about today's clinic visit or your schedule please contact [facility] [place] directly at [phone].  Normal or non-critical lab and imaging results will be communicated to you by MyChart, letter or phone within 4 business days after the clinic has received the results. If you do not hear from us within 7 days, please contact the clinic through MyChart or phone. If you have a critical or abnormal lab  "result, we will notify you by phone as soon as possible.  Submit refill requests through InSequent or call your pharmacy and they will forward the refill request to us. Please allow 3 business days for your refill to be completed.          Additional Information About Your Visit        Polyvorehart Information     InSequent gives you secure access to your electronic health record. If you see a primary care provider, you can also send messages to your care team and make appointments. If you have questions, please call your primary care clinic.  If you do not have a primary care provider, please call [phone] and they will assist you.        Care EveryWhere ID     This is your Care EveryWhere ID. This could be used by other organizations to access your [place] medical records  [id number]        Your Vitals Were     Pulse Temperature Height Pulse Oximetry Breastfeeding? BMI (Body Mass Index)    84 97.8  F (36.6  C) (Oral) 5' 5\" (1.651 m) 99% No 34.11 kg/m2       Blood Pressure from Last 3 Encounters:   10/24/17 124/84   09/06/17 118/78   07/17/17 116/82    Weight from Last 3 Encounters:   10/24/17 205 lb (93 kg)   09/06/17 207 lb (93.9 kg)   07/17/17 201 lb (91.2 kg)              We Performed the Following     CBC with platelets and differential     CRP, inflammation     ESR: Erythrocyte sedimentation rate     TDAP VACCINE (ADACEL)     XR Hand Right G/E 3 Views        Primary Care Provider Office Phone # Fax #    Anastasia Chen PA-C [phone] [address] [facility] [address]        Equal Access to Services     [facility]: Hadii aad ku hadasho Soomaali, waaxda luqadaha, qaybta kaalmada adeegyada, arya moise . So [facility] [phone].    ATENCIÓN: Si habla español, tiene a alonzo disposición servicios gratuitos de asistencia lingüística. Llame al [phone].    We comply with applicable federal civil rights laws and [place] laws. We do not discriminate on the " basis of race, color, national origin, age, disability, sex, sexual orientation, or gender identity.            Thank you!     Thank you for choosing [facility] [place]  for your care. Our goal is always to provide you with excellent care. Hearing back from our patients is one way we can continue to improve our services. Please take a few minutes to complete the written survey that you may receive in the mail after your visit with us. Thank you!             Your Updated Medication List - Protect others around you: Learn how to safely use, store and throw away your medicines at www.disposemymeds.org.          This list is accurate as of: 10/24/17  4:40 PM.  Always use your most recent med list.                   Brand Name Dispense Instructions for use Diagnosis    ACETAMINOPHEN PO           OMEPRAZOLE PO      Take 20 mg by mouth        PROPRANOLOL HCL PO      Take 20 mg by mouth 2 times daily        SUMAtriptan 25 MG tablet    IMITREX    9 tablet    Take 1-2 tablets (25-50 mg) by mouth at onset of headache for migraine May repeat in 2 hours. Max 8 tablets/24 hours.    Migraine with aura and without status migrainosus, not intractable

## 2017-10-24 NOTE — NURSING NOTE
"Chief Complaint   Patient presents with     Injury     injury into her hand from a rodrigo nail       Initial /84  Pulse 84  Temp 97.8  F (36.6  C) (Oral)  Ht 5' 5\" (1.651 m)  Wt 205 lb (93 kg)  SpO2 99%  Breastfeeding? No  BMI 34.11 kg/m2 Estimated body mass index is 34.11 kg/(m^2) as calculated from the following:    Height as of this encounter: 5' 5\" (1.651 m).    Weight as of this encounter: 205 lb (93 kg).  Medication Reconciliation: complete    "

## 2017-10-25 LAB — CRP SERPL-MCNC: <2.9 MG/L (ref 0–8)

## 2017-10-27 NOTE — PROGRESS NOTES
Please call or write patient with the following results:    -CRP (other inflammatory marker) and final x-ray report by radiology were normal as well. Follow-up as discussed in clinic.    Electronically Signed By: Anastasia Chen PA-C

## 2017-11-01 ENCOUNTER — TRANSFERRED RECORDS (OUTPATIENT)
Dept: HEALTH INFORMATION MANAGEMENT | Facility: CLINIC | Age: 42
End: 2017-11-01

## 2017-11-15 ENCOUNTER — HOSPITAL ENCOUNTER (OUTPATIENT)
Dept: GENERAL RADIOLOGY | Facility: CLINIC | Age: 42
Discharge: HOME | End: 2017-11-15
Attending: INTERNAL MEDICINE | Admitting: INTERNAL MEDICINE
Payer: COMMERCIAL

## 2017-11-15 ENCOUNTER — TRANSFERRED RECORDS (OUTPATIENT)
Dept: HEALTH INFORMATION MANAGEMENT | Facility: CLINIC | Age: 42
End: 2017-11-15

## 2017-11-15 ENCOUNTER — HOSPITAL ENCOUNTER (OUTPATIENT)
Dept: NUCLEAR MEDICINE | Facility: CLINIC | Age: 42
Setting detail: NUCLEAR MEDICINE
End: 2017-11-15
Attending: INTERNAL MEDICINE
Payer: COMMERCIAL

## 2017-11-15 DIAGNOSIS — R10.13 EPIGASTRIC PAIN: ICD-10-CM

## 2017-11-15 DIAGNOSIS — R19.7 DIARRHEA, UNSPECIFIED TYPE: ICD-10-CM

## 2017-11-15 PROCEDURE — 78227 HEPATOBIL SYST IMAGE W/DRUG: CPT

## 2017-11-15 PROCEDURE — 34300033 ZZH RX 343: Performed by: RADIOLOGY

## 2017-11-15 PROCEDURE — A9537 TC99M MEBROFENIN: HCPCS | Performed by: RADIOLOGY

## 2017-11-15 RX ORDER — KIT FOR THE PREPARATION OF TECHNETIUM TC 99M MEBROFENIN 45 MG/10ML
6 INJECTION, POWDER, LYOPHILIZED, FOR SOLUTION INTRAVENOUS ONCE
Status: COMPLETED | OUTPATIENT
Start: 2017-11-15 | End: 2017-11-15

## 2017-11-15 RX ADMIN — MEBROFENIN 6 MCI.: 45 INJECTION, POWDER, LYOPHILIZED, FOR SOLUTION INTRAVENOUS at 08:10

## 2017-11-29 ENCOUNTER — OFFICE VISIT (OUTPATIENT)
Dept: FAMILY MEDICINE | Facility: CLINIC | Age: 42
End: 2017-11-29
Payer: COMMERCIAL

## 2017-11-29 VITALS
HEIGHT: 65 IN | DIASTOLIC BLOOD PRESSURE: 74 MMHG | HEART RATE: 78 BPM | BODY MASS INDEX: 34.82 KG/M2 | TEMPERATURE: 98.2 F | SYSTOLIC BLOOD PRESSURE: 104 MMHG | WEIGHT: 209 LBS | OXYGEN SATURATION: 97 %

## 2017-11-29 DIAGNOSIS — F40.243 FEAR OF FLYING: Primary | ICD-10-CM

## 2017-11-29 PROCEDURE — 99213 OFFICE O/P EST LOW 20 MIN: CPT | Performed by: PHYSICIAN ASSISTANT

## 2017-11-29 RX ORDER — LORAZEPAM 0.5 MG/1
0.25-0.5 TABLET ORAL EVERY 8 HOURS PRN
Qty: 4 TABLET | Refills: 0 | Status: SHIPPED | OUTPATIENT
Start: 2017-11-29 | End: 2018-11-28

## 2017-11-29 RX ORDER — ALPRAZOLAM 0.25 MG
TABLET ORAL
Qty: 4 TABLET | Refills: 0 | Status: SHIPPED | OUTPATIENT
Start: 2017-11-29 | End: 2018-11-28

## 2017-11-29 NOTE — MR AVS SNAPSHOT
After Visit Summary   11/29/2017    Teresa Brian    MRN: [id number]           Patient Information     Date Of Birth          1975        Visit Information        Provider Department      11/29/2017 2:20 PM [facility]ast[facility]C [facility]        Today's Diagnoses     Fear of flying    -  1    Need for influenza vaccination          Care Instructions    So excited for your trip.  Ok to take meds prior to flight as reviewed. Not to be taken together.  Only for temp use due to risks.  F/u with GI as planned.    Electronically Signed By: Anastasia Chen PA-C            Follow-ups after your visit        Your next 10 appointments already scheduled     Dec 15, 2017  2:10 PM CST   PHYSICAL with Daniella Duncan MD   [facility]    [address] [place] [id number] [phone]              Who to contact     If you have questions or need follow up information about today's clinic visit or your schedule please contact [facility] directly at [phone].  Normal or non-critical lab and imaging results will be communicated to you by Instagaragehart, letter or phone within 4 business days after the clinic has received the results. If you do not hear from us within 7 days, please contact the clinic through Haoguihuat or phone. If you have a critical or abnormal lab result, we will notify you by phone as soon as possible.  Submit refill requests through Zevia or call your pharmacy and they will forward the refill request to us. Please allow 3 business days for your refill to be completed.          Additional Information About Your Visit        Instagaragehart Information     Zevia gives you secure access to your electronic health record. If you see a primary care provider, you can also send messages to your care team and make appointments. If you have questions, please call your primary care clinic.   "If you do not have a primary care provider, please call [phone] and they will assist you.        Care EveryWhere ID     This is your Care EveryWhere ID. This could be used by other organizations to access your [place] medical records  [id number]        Your Vitals Were     Pulse Temperature Height Pulse Oximetry Breastfeeding? BMI (Body Mass Index)    78 98.2  F (36.8  C) (Oral) 5' 5\" (1.651 m) 97% No 34.78 kg/m2       Blood Pressure from Last 3 Encounters:   11/29/17 104/74   10/24/17 124/84   09/06/17 118/78    Weight from Last 3 Encounters:   11/29/17 209 lb (94.8 kg)   10/24/17 205 lb (93 kg)   09/06/17 207 lb (93.9 kg)              Today, you had the following     No orders found for display         Today's Medication Changes          These changes are accurate as of: 11/29/17  2:50 PM.  If you have any questions, ask your nurse or doctor.               Start taking these medicines.        Dose/Directions    ALPRAZolam 0.25 MG tablet   Commonly known as:  XANAX   Used for:  Fear of flying   Started by:  Anastasia Chen PA-C        Take 30 minutes prior to daytime flight.   Quantity:  4 tablet   Refills:  0       LORazepam 0.5 MG tablet   Commonly known as:  ATIVAN   Used for:  Fear of flying   Started by:  Anastasia Chen PA-C        Dose:  0.25-0.5 mg   Take 0.5-1 tablets (0.25-0.5 mg) by mouth every 8 hours as needed for anxiety Take 30 minutes prior to departure.  Do not operate a vehicle after taking this medication   Quantity:  4 tablet   Refills:  0            Where to get your medicines      Some of these will need a paper prescription and others can be bought over the counter.  Ask your nurse if you have questions.     Bring a paper prescription for each of these medications     ALPRAZolam 0.25 MG tablet    LORazepam 0.5 MG tablet                Primary Care Provider Office Phone # Fax #    Anastasia Chen PA-C [phone] [address]  [place] [address]        Equal Access to Services     [facility] : Hadii aad ku hadabrahamo Soalaynaali, waaxda luqadaha, qaybta kaalmada dejennifer, waxluis janett chelsyleoncio marinmartircooper rucker. So [facility] [phone].    ATENCIÓN: Si habla español, tiene a alonzo disposición servicios gratuitos de asistencia lingüística. Chato al [phone].    We comply with applicable federal civil rights laws and [place] laws. We do not discriminate on the basis of race, color, national origin, age, disability, sex, sexual orientation, or gender identity.            Thank you!     Thank you for choosing [facility]  for your care. Our goal is always to provide you with excellent care. Hearing back from our patients is one way we can continue to improve our services. Please take a few minutes to complete the written survey that you may receive in the mail after your visit with us. Thank you!             Your Updated Medication List - Protect others around you: Learn how to safely use, store and throw away your medicines at www.disposemymeds.org.          This list is accurate as of: 11/29/17  2:50 PM.  Always use your most recent med list.                   Brand Name Dispense Instructions for use Diagnosis    ACETAMINOPHEN PO           ALPRAZolam 0.25 MG tablet    XANAX    4 tablet    Take 30 minutes prior to daytime flight.    Fear of flying       LORazepam 0.5 MG tablet    ATIVAN    4 tablet    Take 0.5-1 tablets (0.25-0.5 mg) by mouth every 8 hours as needed for anxiety Take 30 minutes prior to departure.  Do not operate a vehicle after taking this medication    Fear of flying       OMEPRAZOLE PO      Take 20 mg by mouth        PROPRANOLOL HCL PO      Take 20 mg by mouth 2 times daily        SUMAtriptan 25 MG tablet    IMITREX    9 tablet    Take 1-2 tablets (25-50 mg) by mouth at onset of headache for migraine May repeat in 2 hours. Max 8 tablets/24 hours.    Migraine with aura and without status migrainosus, not  intractable

## 2017-11-29 NOTE — Clinical Note
This chart won't let me close it since it says you have unfinished variables, but I don't know where. Can you help me with this?

## 2017-11-29 NOTE — PATIENT INSTRUCTIONS
So excited for your trip.  Ok to take meds prior to flight as reviewed. Not to be taken together.  Only for temp use due to risks.  F/u with GI as planned.    Electronically Signed By: Anastasia Chen PA-C

## 2017-11-29 NOTE — PROGRESS NOTES
"  SUBJECTIVE:   Teresa Brian is a 42 year old female who presents to clinic today for the following health issues:      Concern - fear of flying; will be going to [place] with significant other for belated birthday present to see Ajit Henry. Also has a surprise date scheduled. Very excited. Will be there 4 days.  Main concern is that she hates flying. States when she flies she \"cries and freaks out.\" Reports she hyperventilates and is very anxious.  In the past she has been given ativan for night-time flights and the xanax for daytime flight.  Never took both together.  Usually would just get a script of 4.  Would like to have re-filled today.    Onset:     Description:   -fear of flying    Intensity: moderate    Interval update - saw GI and had neg HIDA scan. Think symptoms may be due to constipation versus other. Will follow-up with them for on-going management.    Problem list and histories reviewed & adjusted, as indicated.  Additional history: as documented    Patient Active Problem List   Diagnosis     CARDIOVASCULAR SCREENING; LDL GOAL LESS THAN 160     BMI 34.0-34.9,adult     Menorrhagia     Personal history of cervical dysplasia     Migraine with aura and without status migrainosus, not intractable     Non morbid obesity due to excess calories     Past Surgical History:   Procedure Laterality Date     ARTHROSCOPIC RECONSTRUCTION ANTERIOR CRUCIATE LIGAMENT Right 10/2010    Reconstruction (ACL, MCL) and fibular fx     COLONOSCOPY       COLONOSCOPY N/A 6/27/2017    Procedure: COLONOSCOPY;;  Surgeon: Allan Olivier MD;  Location:  GI     COLPOSCOPY CERVIX, LOOP ELECTRODE BIOPSY, COMBINED  08/2011    squamous metaplasia     ESOPHAGOSCOPY, GASTROSCOPY, DUODENOSCOPY (EGD), COMBINED       ESOPHAGOSCOPY, GASTROSCOPY, DUODENOSCOPY (EGD), COMBINED N/A 6/27/2017    Procedure: COMBINED ESOPHAGOSCOPY, GASTROSCOPY, DUODENOSCOPY (EGD), BIOPSY SINGLE OR MULTIPLE;  ESOPHAGOSCOPY, GASTROSCOPY, DUODENOSCOPY " "(EGD with biopsies by cold forcep, Colonoscopy with biopsies by cold forcep.;  Surgeon: Allan Olivier MD;  Location:  GI     HC TOOTH EXTRACTION W/FORCEP  2000    wisdom teeth extraction     LAPAROSCOPY DIAGNOSTIC (GYN)  1992    negative ([facility])       Social History   Substance Use Topics     Smoking status: Never Smoker     Smokeless tobacco: Never Used     Alcohol use 1.2 oz/week     2 Standard drinks or equivalent per week      Comment: 2 drinks per week avg     Family History   Problem Relation Age of Onset     Neurologic Disorder Mother      Fibromyalgia. migraines     Neurologic Disorder Father      parkinson with early dementia     Colon Cancer No family hx of          Current Outpatient Prescriptions   Medication Sig Dispense Refill     LORazepam (ATIVAN) 0.5 MG tablet Take 0.5-1 tablets (0.25-0.5 mg) by mouth every 8 hours as needed for anxiety Take 30 minutes prior to departure.  Do not operate a vehicle after taking this medication 4 tablet 0     ALPRAZolam (XANAX) 0.25 MG tablet Take 30 minutes prior to daytime flight. 4 tablet 0     PROPRANOLOL HCL PO Take 20 mg by mouth 2 times daily       SUMAtriptan (IMITREX) 25 MG tablet Take 1-2 tablets (25-50 mg) by mouth at onset of headache for migraine May repeat in 2 hours. Max 8 tablets/24 hours. 9 tablet 1     OMEPRAZOLE PO Take 20 mg by mouth       ACETAMINOPHEN PO        Allergies   Allergen Reactions     Vicodin [Hydrocodone-Acetaminophen] Nausea and Vomiting         Reviewed and updated as needed this visit by clinical staffTobacco  Allergies  Meds  Med Hx  Surg Hx  Fam Hx  Soc Hx      Reviewed and updated as needed this visit by Provider  Tobacco  Allergies  Meds  Med Hx  Surg Hx  Fam Hx  Soc Hx        ROS:  Constitutional, HEENT, cardiovascular, pulmonary, psych systems are negative, except as otherwise noted.      OBJECTIVE:   /74  Pulse 78  Temp 98.2  F (36.8  C) (Oral)  Ht 5' 5\" (1.651 m)  Wt 209 lb (94.8 kg)  SpO2 " 97%  Breastfeeding? No  BMI 34.78 kg/m2  Body mass index is 34.78 kg/(m^2).  GENERAL: healthy, alert and no distress  PSYCH: mentation appears normal, affect normal/bright    Diagnostic Test Results:  none     ASSESSMENT/PLAN:       ICD-10-CM    1. Fear of flying F40.243 LORazepam (ATIVAN) 0.5 MG tablet     ALPRAZolam (XANAX) 0.25 MG tablet   2. Need for influenza vaccination Z23    Scripts provided as has helped her significantly for flight anxiety in the past.  Risks/appropriate use reviewed and pt in agreement with temp use only.  Declines flu vaccine.  See Patient Instructions  Patient Instructions   So excited for your trip.  Ok to take meds prior to flight as reviewed. Not to be taken together.  Only for temp use due to risks.  F/u with GI as planned.    Electronically Signed By: Anastasia Chen PA-C

## 2017-11-29 NOTE — NURSING NOTE
"Chief Complaint   Patient presents with     Anxiety     fear of flying       Initial /74  Pulse 78  Temp 98.2  F (36.8  C) (Oral)  Ht 5' 5\" (1.651 m)  Wt 209 lb (94.8 kg)  SpO2 97%  Breastfeeding? No  BMI 34.78 kg/m2 Estimated body mass index is 34.78 kg/(m^2) as calculated from the following:    Height as of this encounter: 5' 5\" (1.651 m).    Weight as of this encounter: 209 lb (94.8 kg).  Medication Reconciliation: complete    "

## 2017-12-14 ENCOUNTER — TRANSFERRED RECORDS (OUTPATIENT)
Dept: HEALTH INFORMATION MANAGEMENT | Facility: CLINIC | Age: 42
End: 2017-12-14

## 2017-12-15 ENCOUNTER — OFFICE VISIT (OUTPATIENT)
Dept: OBGYN | Facility: CLINIC | Age: 42
End: 2017-12-15
Payer: COMMERCIAL

## 2017-12-15 VITALS
WEIGHT: 209 LBS | SYSTOLIC BLOOD PRESSURE: 120 MMHG | HEIGHT: 65 IN | DIASTOLIC BLOOD PRESSURE: 76 MMHG | BODY MASS INDEX: 34.82 KG/M2

## 2017-12-15 DIAGNOSIS — R10.13 EPIGASTRIC PAIN: ICD-10-CM

## 2017-12-15 DIAGNOSIS — N92.0 MENORRHAGIA WITH REGULAR CYCLE: ICD-10-CM

## 2017-12-15 DIAGNOSIS — Z01.419 ENCOUNTER FOR GYNECOLOGICAL EXAMINATION WITHOUT ABNORMAL FINDING: Primary | ICD-10-CM

## 2017-12-15 PROCEDURE — 99396 PREV VISIT EST AGE 40-64: CPT | Performed by: OBSTETRICS & GYNECOLOGY

## 2017-12-15 ASSESSMENT — ANXIETY QUESTIONNAIRES
IF YOU CHECKED OFF ANY PROBLEMS ON THIS QUESTIONNAIRE, HOW DIFFICULT HAVE THESE PROBLEMS MADE IT FOR YOU TO DO YOUR WORK, TAKE CARE OF THINGS AT HOME, OR GET ALONG WITH OTHER PEOPLE: NOT DIFFICULT AT ALL
6. BECOMING EASILY ANNOYED OR IRRITABLE: NOT AT ALL
7. FEELING AFRAID AS IF SOMETHING AWFUL MIGHT HAPPEN: NOT AT ALL
GAD7 TOTAL SCORE: 0
3. WORRYING TOO MUCH ABOUT DIFFERENT THINGS: NOT AT ALL
1. FEELING NERVOUS, ANXIOUS, OR ON EDGE: NOT AT ALL
5. BEING SO RESTLESS THAT IT IS HARD TO SIT STILL: NOT AT ALL
2. NOT BEING ABLE TO STOP OR CONTROL WORRYING: NOT AT ALL

## 2017-12-15 ASSESSMENT — PATIENT HEALTH QUESTIONNAIRE - PHQ9
5. POOR APPETITE OR OVEREATING: NOT AT ALL
SUM OF ALL RESPONSES TO PHQ QUESTIONS 1-9: 0

## 2017-12-15 NOTE — MR AVS SNAPSHOT
After Visit Summary   12/15/2017    Teresa Brian    MRN: [id number]           Patient Information     Date Of Birth          1975        Visit Information        Provider Department      12/15/2017 2:10 PM Daniella Duncan MD [facility]        Today's Diagnoses     Encounter for gynecological examination without abnormal finding    -  1    Epigastric pain          Care Instructions    Schedule your annual screening mammogram  Return to clinic for screening Lipids and Fasting Blood sugar.  Follow up with your primary care provider/gastroenterology for your other medical problems.  Continue self breast exam.  Increase physical activity and exercise.  Usual safety and preventative measures counseling done.  BMI >25  Weight loss encouraged.  Flu Shot declined today.  Last pap smear (2016) was normal and negative for the DNA of high risk HPV subtypes.  No pap was obtained this year.  This was discussed with the patient and she agrees with the plan.          Follow-ups after your visit        Follow-up notes from your care team     Return in about 1 year (around 12/15/2018) for Annual Exam.      Your next 10 appointments already scheduled     Dec 29, 2017  3:30 PM CST   (Arrive by 3:15 PM)   MA SCREENING DIGITAL BILATERAL with WEMA1   [facility]    [address] [place] [address], [address]  [facility] [id number] [phone]           Do not use any powder, lotion or deodorant under your arms or on your breast. If you do, we will ask you to remove it before your exam.  Wear comfortable, two-piece clothing.  If you have any allergies, tell your care team.  Bring any previous mammograms from other facilities or have them mailed to the breast center. Three-dimensional (3D) mammograms are available at [place] locations in [facility], [facility], and [place]. [facility] locations  "include [facility] in [place]. Benefits of 3D mammograms include: - Improved rate of cancer detection - Decreases your chance of having to go back for more tests, which means fewer: - \"False-positive\" results (This means that there is an abnormal area but it isn't cancer.) - Invasive testing procedures, such as a biopsy or surgery - Can provide clearer images of the breast if you have dense breast tissue. 3D mammography is an optional exam that anyone can have with a 2D mammogram. It doesn't replace or take the place of a 2D mammogram. 2D mammograms remain an effective screening test for all women.  Not all insurance companies cover the cost of a 3D mammogram. Check with your insurance.              Who to contact     If you have questions or need follow up information about today's clinic visit or your schedule please contact [facility] directly at [phone].  Normal or non-critical lab and imaging results will be communicated to you by Massage Envyhart, letter or phone within 4 business days after the clinic has received the results. If you do not hear from us within 7 days, please contact the clinic through Invajot or phone. If you have a critical or abnormal lab result, we will notify you by phone as soon as possible.  Submit refill requests through Ampere or call your pharmacy and they will forward the refill request to us. Please allow 3 business days for your refill to be completed.          Additional Information About Your Visit        Ampere Information     Ampere gives you secure access to your electronic health record. If you see a primary care provider, you can also send messages to your care team and make appointments. If you have questions, please call your primary care clinic.  If you do not have a primary care provider, please call [phone] and they will assist you.        Care EveryWhere ID     This is your Care EveryWhere ID. This could be " "used by other organizations to access your [place] medical records  [id number]        Your Vitals Were     Height Last Period Breastfeeding? BMI (Body Mass Index)          5' 5\" (1.651 m) 11/27/2017 (Exact Date) No 34.78 kg/m2         Blood Pressure from Last 3 Encounters:   12/15/17 120/76   11/29/17 104/74   10/24/17 124/84    Weight from Last 3 Encounters:   12/15/17 209 lb (94.8 kg)   11/29/17 209 lb (94.8 kg)   10/24/17 205 lb (93 kg)              Today, you had the following     No orders found for display       Primary Care Provider Office Phone # Fax #    Anastasia Chen PA-C [phone] [address]  [place] [address]        Equal Access to Services     [facility]: Hadii aad ku hadasho Soomaali, waaxda luqadaha, qaybta kaalmada adeegyada, arya rowland haymelisa moise . So [facility] [phone].    ATENCIÓN: Si habla español, tiene a alonzo disposición servicios gratuitos de asistencia lingüística. Brendaame al [phone].    We comply with applicable federal civil rights laws and [place] laws. We do not discriminate on the basis of race, color, national origin, age, disability, sex, sexual orientation, or gender identity.            Thank you!     Thank you for choosing [facility]  for your care. Our goal is always to provide you with excellent care. Hearing back from our patients is one way we can continue to improve our services. Please take a few minutes to complete the written survey that you may receive in the mail after your visit with us. Thank you!             Your Updated Medication List - Protect others around you: Learn how to safely use, store and throw away your medicines at www.disposemymeds.org.          This list is accurate as of: 12/15/17  2:54 PM.  Always use your most recent med list.                   Brand Name Dispense Instructions for use Diagnosis    ALPRAZolam 0.25 MG tablet    XANAX    4 tablet    Take 30 minutes prior " to daytime flight.    Fear of flying       LORazepam 0.5 MG tablet    ATIVAN    4 tablet    Take 0.5-1 tablets (0.25-0.5 mg) by mouth every 8 hours as needed for anxiety Take 30 minutes prior to departure.  Do not operate a vehicle after taking this medication    Fear of flying       SUMAtriptan 25 MG tablet    IMITREX    9 tablet    Take 1-2 tablets (25-50 mg) by mouth at onset of headache for migraine May repeat in 2 hours. Max 8 tablets/24 hours.    Migraine with aura and without status migrainosus, not intractable

## 2017-12-15 NOTE — PROGRESS NOTES
Teresa is a 42 year old  female who presents for annual exam.     Besides routine health maintenance, she has no other health concerns today .    HPI:  Here today for yearly exam --doing pretty well.  Monthly menses but still quite heavy.  Usually 2-3d of heavy bleeding and cramping/back pain.  Does have mood changes the week prior to menses and often nausea in the days leading up to.  No intermenstrual bleeding/spotting.  Declines intervention.  +SA --using vasectomy for contraception.  Has had intermittent epigastric pain with sex.  Not every time and not cyclic.  Has been working with [facility] for GI issues and had negative workup to date.  +constipation --will be starting bowel cleanse and then may incorporate miralax into regimen.  Has had normal colonoscopy, CT, HIDA, etc.  No bladder issues.    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  Her current contraception method is: vasectomy.  She  reports that she has never smoked. She has never used smokeless tobacco.      Patient is sexually active.  STD testing offered?  Declined  Last PHQ-9 score on record =   PHQ-9 SCORE 12/15/2017   Total Score 0     Last GAD7 score on record =   GARRY-7 SCORE 12/15/2017   Total Score 0     Alcohol Score = 2    HEALTH MAINTENANCE:  Cholesterol: (  Cholesterol   Date Value Ref Range Status   10/23/2015 167 <200 mg/dL Final     Comment:     LDL Cholesterol is the primary guide to therapy.   The NCEP recommends further evaluation of: patients with  cholesterol greater   than 200 mg/dL if additional risk factors are present, cholesterol greater   than   240 mg/dL, triglycerides greater than 150 mg/dL, or HDL less than 40 mg/dL.      NA  Last Mammo: one year ago, Result: normal, Next Mammo: scheduled in 2 weeks   Pap: (  Lab Results   Component Value Date    PAP NIL 2016 WNL   Colonoscopy:  27, Result: normal, Next Colonoscopy: NA years.  Dexa:  NA    Health maintenance updated:  yes    HISTORY:  Obstetric History       T0      L0     SAB0   TAB0   Ectopic0   Multiple0   Live Births0           Patient Active Problem List   Diagnosis     CARDIOVASCULAR SCREENING; LDL GOAL LESS THAN 160     BMI 34.0-34.9,adult     Menorrhagia     History of cervical dysplasia     Migraine with aura and without status migrainosus, not intractable     Non morbid obesity due to excess calories     Past Surgical History:   Procedure Laterality Date     ARTHROSCOPIC RECONSTRUCTION ANTERIOR CRUCIATE LIGAMENT Right 10/2010    Reconstruction (ACL, MCL) and fibular fx     CATARACT IOL, RT/LT      2017, 2017     COLONOSCOPY       COLONOSCOPY N/A 2017    Procedure: COLONOSCOPY;;  Surgeon: Allan Olivier MD;  Location:  GI     COLPOSCOPY CERVIX, LOOP ELECTRODE BIOPSY, COMBINED  2011    squamous metaplasia     ESOPHAGOSCOPY, GASTROSCOPY, DUODENOSCOPY (EGD), COMBINED       ESOPHAGOSCOPY, GASTROSCOPY, DUODENOSCOPY (EGD), COMBINED N/A 2017    Procedure: COMBINED ESOPHAGOSCOPY, GASTROSCOPY, DUODENOSCOPY (EGD), BIOPSY SINGLE OR MULTIPLE;  ESOPHAGOSCOPY, GASTROSCOPY, DUODENOSCOPY (EGD with biopsies by cold forcep, Colonoscopy with biopsies by cold forcep.;  Surgeon: Allan Olivier MD;  Location:  GI     HC TOOTH EXTRACTION W/FORCEP      wisdom teeth extraction     LAPAROSCOPY DIAGNOSTIC (GYN)      negative ([facility])      Social History   Substance Use Topics     Smoking status: Never Smoker     Smokeless tobacco: Never Used  "    Alcohol use 1.2 oz/week     2 Standard drinks or equivalent per week      Comment: 2 drinks per week avg      Problem (# of Occurrences) Relation (Name,Age of Onset)    Neurologic Disorder (2) Mother ( ): Fibromyalgia. migraines, Father ( ): parkinson with early dementia       Negative family history of: Colon Cancer            Current Outpatient Prescriptions   Medication Sig     SUMAtriptan (IMITREX) 25 MG tablet Take 1-2 tablets (25-50 mg) by mouth at onset of headache for migraine May repeat in 2 hours. Max 8 tablets/24 hours.     LORazepam (ATIVAN) 0.5 MG tablet Take 0.5-1 tablets (0.25-0.5 mg) by mouth every 8 hours as needed for anxiety Take 30 minutes prior to departure.  Do not operate a vehicle after taking this medication (Patient not taking: Reported on 12/15/2017)     ALPRAZolam (XANAX) 0.25 MG tablet Take 30 minutes prior to daytime flight. (Patient not taking: Reported on 12/15/2017)     No current facility-administered medications for this visit.      Allergies   Allergen Reactions     Vicodin [Hydrocodone-Acetaminophen] Nausea and Vomiting       Past medical, surgical, social and family histories were reviewed and updated in EPIC.    ROS:   Gastrointestinal: Abdominal Pain, Constipation, Diarrhea and Nausea  Genitourinary: Cramps, Irregular Menses, Painful [place] and Vaginal Itching  Neurologic: Dizziness    EXAM:  /76  Ht 5' 5\" (1.651 m)  Wt 209 lb (94.8 kg)  LMP 11/27/2017 (Exact Date)  Breastfeeding? No  BMI 34.78 kg/m2   BMI: Body mass index is 34.78 kg/(m^2).    PHYSICAL EXAM:  Constitutional:  Appearance: Well nourished, well developed, alert, in no acute distress  Neck:  Lymph Nodes:  No lymphadenopathy present    Thyroid:  Gland size normal, nontender, no nodules or masses present  on palpation  Chest:  Respiratory Effort:  Breathing unlabored  Cardiovascular:    Heart: Auscultation:  Regular rate, normal rhythm, no murmurs present  Breasts: Inspection of Breasts:  No " lymphadenopathy present., Palpation of Breasts and Axillae:  No masses present on palpation, no breast tenderness., Axillary Lymph Nodes:  No lymphadenopathy present. and No nodularity, asymmetry or nipple discharge bilaterally.  Gastrointestinal:   Abdominal Examination:  Abdomen nontender to palpation, tone normal without rigidity or guarding, no masses present, umbilicus without lesions   Liver and Spleen:  No hepatomegaly present, liver nontender to palpation    Hernias:  No hernias present  Lymphatic: Lymph Nodes:  No other lymphadenopathy present  Skin:  General Inspection:  No rashes present, no lesions present, no areas of  discoloration    Genitalia and Groin:  No rashes present, no lesions present, no areas of  discoloration, no masses present  Neurologic/Psychiatric:    Mental Status:  Oriented X3     Pelvic Exam:  External Genitalia:     Normal appearance for age, no discharge present, no tenderness present, no inflammatory lesions present, color normal  Vagina:     Normal vaginal vault without central or paravaginal defects, no discharge present, no inflammatory lesions present, no masses present  Bladder:     Nontender to palpation  Urethra:   Urethral Body:  Urethra palpation normal, urethra structural support normal   Urethral Meatus:  No erythema or lesions present  Cervix:     Appearance healthy, no lesions present, nontender to palpation, no bleeding present  Uterus:     Uterus: firm, normal sized and nontender, midplane in position.   Adnexa:     No adnexal tenderness present, no adnexal masses present  Perineum:     Perineum within normal limits, no evidence of trauma, no rashes or skin lesions present  Anus:     Anus within normal limits, no hemorrhoids present  Inguinal Lymph Nodes:     No lymphadenopathy present  Pubic Hair:     Normal pubic hair distribution for age  Genitalia and Groin:     No rashes present, no lesions present, no areas of discoloration, no masses present      COUNSELING:    Reviewed preventive health counseling, as reflected in patient instructions  Special attention given to:        Regular exercise       Healthy diet/nutrition       Colon cancer screening       (Di)menopause management    BMI: Body mass index is 34.78 kg/(m^2).  Weight management plan: Discussed healthy diet and exercise guidelines and patient will follow up in 12 months in clinic to re-evaluate. Patient was referred to their PCP to discuss a diet and exercise plan.    ASSESSMENT:  42 year old female with satisfactory annual exam.    ICD-10-CM    1. Encounter for gynecological examination without abnormal finding Z01.419    2. Epigastric pain R10.13    3. Menorrhagia with regular cycle N92.0        PLAN:  Patient Instructions   Schedule your annual screening mammogram  Return to clinic for screening Lipids and Fasting Blood sugar.  Follow up with your primary care provider/gastroenterology for your other medical problems.  Continue self breast exam.  Increase physical activity and exercise.  Usual safety and preventative measures counseling done.  BMI >25  Weight loss encouraged.  Flu Shot declined today.  Last pap smear (2016) was normal and negative for the DNA of high risk HPV subtypes.  No pap was obtained this year.  This was discussed with the patient and she agrees with the plan.      Daniella Duncan MD

## 2017-12-15 NOTE — PATIENT INSTRUCTIONS
Schedule your annual screening mammogram  Return to clinic for screening Lipids and Fasting Blood sugar.  Follow up with your primary care provider/gastroenterology for your other medical problems.  Continue self breast exam.  Increase physical activity and exercise.  Usual safety and preventative measures counseling done.  BMI >25  Weight loss encouraged.  Flu Shot declined today.  Last pap smear (2016) was normal and negative for the DNA of high risk HPV subtypes.  No pap was obtained this year.  This was discussed with the patient and she agrees with the plan.

## 2017-12-16 ASSESSMENT — ANXIETY QUESTIONNAIRES: GAD7 TOTAL SCORE: 0

## 2017-12-29 ENCOUNTER — RADIANT APPOINTMENT (OUTPATIENT)
Dept: MAMMOGRAPHY | Facility: CLINIC | Age: 42
End: 2017-12-29
Payer: COMMERCIAL

## 2017-12-29 DIAGNOSIS — Z12.31 VISIT FOR SCREENING MAMMOGRAM: ICD-10-CM

## 2017-12-29 PROCEDURE — G0202 SCR MAMMO BI INCL CAD: HCPCS | Mod: TC

## 2017-12-29 PROCEDURE — 77063 BREAST TOMOSYNTHESIS BI: CPT | Mod: TC

## 2018-01-22 DIAGNOSIS — G43.109 MIGRAINE WITH AURA AND WITHOUT STATUS MIGRAINOSUS, NOT INTRACTABLE: ICD-10-CM

## 2018-01-22 NOTE — TELEPHONE ENCOUNTER
SUMAtriptan (IMITREX) 25 MG tablet      Last Written Prescription Date:  4/24/2017  Last Fill Quantity: 9 tablet,   # refills: 1  Last Office Visit: 11/29/2017  Future Office visit:       Routing refill request to provider for review/approval because:  Drug not on the [facility], [facility] or [facility] refill protocol or controlled substance

## 2018-01-23 RX ORDER — SUMATRIPTAN 25 MG/1
25-50 TABLET, FILM COATED ORAL
Qty: 9 TABLET | Refills: 1 | Status: SHIPPED | OUTPATIENT
Start: 2018-01-23 | End: 2019-04-02

## 2018-01-23 NOTE — TELEPHONE ENCOUNTER
Prescription approved per [facility] Refill Protocol.  Tierra Merida, RN, BSN  [facility]

## 2018-11-28 ENCOUNTER — OFFICE VISIT (OUTPATIENT)
Dept: FAMILY MEDICINE | Facility: CLINIC | Age: 43
End: 2018-11-28
Payer: COMMERCIAL

## 2018-11-28 VITALS
BODY MASS INDEX: 36.15 KG/M2 | WEIGHT: 217 LBS | HEART RATE: 88 BPM | TEMPERATURE: 98.2 F | OXYGEN SATURATION: 98 % | SYSTOLIC BLOOD PRESSURE: 124 MMHG | DIASTOLIC BLOOD PRESSURE: 76 MMHG | HEIGHT: 65 IN

## 2018-11-28 DIAGNOSIS — Z11.4 ENCOUNTER FOR SCREENING FOR HIV: ICD-10-CM

## 2018-11-28 DIAGNOSIS — F40.243 FEAR OF FLYING: ICD-10-CM

## 2018-11-28 DIAGNOSIS — R25.1 TREMOR: Primary | ICD-10-CM

## 2018-11-28 DIAGNOSIS — Z23 NEED FOR INFLUENZA VACCINATION: ICD-10-CM

## 2018-11-28 DIAGNOSIS — Z82.0 FAMILY HISTORY OF PARKINSON DISEASE: ICD-10-CM

## 2018-11-28 DIAGNOSIS — R68.89 THROAT SYMPTOM: ICD-10-CM

## 2018-11-28 LAB
ALBUMIN SERPL-MCNC: 3.6 G/DL (ref 3.4–5)
ALP SERPL-CCNC: 61 U/L (ref 40–150)
ALT SERPL W P-5'-P-CCNC: 22 U/L (ref 0–50)
ANION GAP SERPL CALCULATED.3IONS-SCNC: 7 MMOL/L (ref 3–14)
AST SERPL W P-5'-P-CCNC: 15 U/L (ref 0–45)
BILIRUB SERPL-MCNC: 0.2 MG/DL (ref 0.2–1.3)
BUN SERPL-MCNC: 12 MG/DL (ref 7–30)
CALCIUM SERPL-MCNC: 8.7 MG/DL (ref 8.5–10.1)
CHLORIDE SERPL-SCNC: 107 MMOL/L (ref 94–109)
CO2 SERPL-SCNC: 25 MMOL/L (ref 20–32)
CREAT SERPL-MCNC: 0.78 MG/DL (ref 0.52–1.04)
ERYTHROCYTE [DISTWIDTH] IN BLOOD BY AUTOMATED COUNT: 12.3 % (ref 10–15)
GFR SERPL CREATININE-BSD FRML MDRD: 80 ML/MIN/1.7M2
GLUCOSE SERPL-MCNC: 90 MG/DL (ref 70–99)
HCT VFR BLD AUTO: 41.3 % (ref 35–47)
HGB BLD-MCNC: 13.7 G/DL (ref 11.7–15.7)
HIV 1+2 AB+HIV1 P24 AG SERPL QL IA: NONREACTIVE
MCH RBC QN AUTO: 30.2 PG (ref 26.5–33)
MCHC RBC AUTO-ENTMCNC: 33.2 G/DL (ref 31.5–36.5)
MCV RBC AUTO: 91 FL (ref 78–100)
PLATELET # BLD AUTO: 226 10E9/L (ref 150–450)
POTASSIUM SERPL-SCNC: 4 MMOL/L (ref 3.4–5.3)
PROT SERPL-MCNC: 7 G/DL (ref 6.8–8.8)
RBC # BLD AUTO: 4.53 10E12/L (ref 3.8–5.2)
SODIUM SERPL-SCNC: 139 MMOL/L (ref 133–144)
TSH SERPL DL<=0.005 MIU/L-ACNC: 1.36 MU/L (ref 0.4–4)
VIT B12 SERPL-MCNC: 429 PG/ML (ref 193–986)
WBC # BLD AUTO: 6.7 10E9/L (ref 4–11)

## 2018-11-28 PROCEDURE — 82607 VITAMIN B-12: CPT | Performed by: PHYSICIAN ASSISTANT

## 2018-11-28 PROCEDURE — 99214 OFFICE O/P EST MOD 30 MIN: CPT | Performed by: PHYSICIAN ASSISTANT

## 2018-11-28 PROCEDURE — 85027 COMPLETE CBC AUTOMATED: CPT | Performed by: PHYSICIAN ASSISTANT

## 2018-11-28 PROCEDURE — 36415 COLL VENOUS BLD VENIPUNCTURE: CPT | Performed by: PHYSICIAN ASSISTANT

## 2018-11-28 PROCEDURE — 87389 HIV-1 AG W/HIV-1&-2 AB AG IA: CPT | Performed by: PHYSICIAN ASSISTANT

## 2018-11-28 PROCEDURE — 84443 ASSAY THYROID STIM HORMONE: CPT | Performed by: PHYSICIAN ASSISTANT

## 2018-11-28 PROCEDURE — 80053 COMPREHEN METABOLIC PANEL: CPT | Performed by: PHYSICIAN ASSISTANT

## 2018-11-28 RX ORDER — LORAZEPAM 0.5 MG/1
0.25-0.5 TABLET ORAL EVERY 8 HOURS PRN
Qty: 4 TABLET | Refills: 0 | Status: SHIPPED | OUTPATIENT
Start: 2018-11-28 | End: 2019-04-02

## 2018-11-28 RX ORDER — ALPRAZOLAM 0.25 MG
TABLET ORAL
Qty: 4 TABLET | Refills: 0 | Status: SHIPPED | OUTPATIENT
Start: 2018-11-28 | End: 2019-04-02

## 2018-11-28 NOTE — MR AVS SNAPSHOT
After Visit Summary   11/28/2018    Teresa Brian    MRN: [id number]           Patient Information     Date Of Birth          1975        Visit Information        Provider Department      11/28/2018 8:00 AM Anastasia Norton PA-C [facility] [place]        Today's Diagnoses     Tremor    -  1    Fear of flying        Family history of Parkinson disease - Father early onset around age 30-40's        Encounter for screening for HIV        Need for influenza vaccination        Throat symptom          Care Instructions    Ok to take meds for flying anxiety as you've done in the past.   Unclear origin for tremor - history suggest this could be more of an action type tremor since it occurs mostly with typing and improves on repositioning. Provided reassurance it sounds less to be parkinson like as this tremor occurs at rest.   Check labs and consider neurology consult if worsening or ongoing concerns to confirm diagnosis.  Normal throat exam without enlargement of lymph nodes or thyroid. Unclear if low-grade viral process versus other. Considered strep testing, but pt declines. Could consider consult with ENT if persistent.           Follow-ups after your visit        Additional Services     NEUROLOGY ADULT REFERRAL       Your provider has referred you for the following:   Consult at [facility]: [facility] [phone]   [email or web address]  [facility]: [facility] [phone]   [email or web address]  [facility]: [facility] [phone]   [email or web address]      Please be aware that coverage of these services is subject to the terms and limitations of your health insurance plan.  Call member services at your health plan with any benefit or coverage questions.      Please bring the following with you to your appointment:    (1)  Any X-Rays, CTs or MRIs which have been performed.  Contact the facility where they were done to arrange for  prior to your scheduled appointment.    (2) List of current medications  (3) This referral request   (4) Any documents/labs given to you for this referral                  Follow-up notes from your care team     Return in about 1 week (around 12/5/2018) for for your flu vaccine.      Your next 10 appointments already scheduled     Jan 11, 2019  8:15 AM CST   MA SCREENING DIGITAL BILATERAL with [email or web address]   [facility] [place] ([facility] [place])    [address] [place] [address], [address]  [facility] [address] [phone]           How do I prepare for my exam? (Food and drink instructions) No Food and Drink Restrictions.  How do I prepare for my exam? (Other instructions) Do not use any powder, lotion or deodorant under your arms or on your breast. If you do, we will ask you to remove it before your exam.  What should I wear: Wear comfortable, two-piece clothing.  How long does the exam take: Most scans will take 15 minutes.  What should I bring: Bring any previous mammograms from other facilities or have them mailed to the breast center.  Do I need a :  No  is needed.  What do I need to tell my doctor: If you have any allergies, tell your care team.  What should I do after the exam: No restrictions, You may resume normal activities.  What is this test: This test is an x-ray of the breast to look for breast disease. The breast is pressed between two plates to flatten and spread the tissue. An X-ray is taken of the breast from different angles.  Who should I call with questions: If you have any questions, please call the Imaging Department where you will have your exam. Directions, parking instructions, and other information is available on our website, [email or web address].  Other information about my exam Three-dimensional (3D) mammograms are available at [facility]  "locations in [facility], [facility], [place], [facility] and [place].  Health locations include [place] and the [facility] [place] in [place].  Benefits of 3D mammograms include * Improved rate of cancer detection * Decreases your chance of having to go back for more tests, which means fewer: * \"False-positive\" results (This means that there is an abnormal area but it isn't cancer.) * Invasive testing procedures, such as a biopsy or surgery * Can provide clearer images of the breast if you have dense breast tissue.  *3D mammography is an optional exam that anyone can have with a 2D mammogram. It doesn't replace or take the place of a 2D mammogram. 2D mammograms remain an effective screening test for all women.  Not all insurance companies cover the cost of a 3D mammogram. Check with your insurance. Three-dimensional (3D) mammograms are available at [facility] in [facility], [facility], [facility], and [place]. Health locations include [place] and [facility] [place] in [place]. Benefits of 3D mammograms include: - Improved rate of cancer detection - Decreases your chance of having to go back for more tests, which means fewer: - \"False-positive\" results (This means that there is an abnormal area but it isn't cancer.) - Invasive testing procedures, such as a biopsy or surgery - Can provide clearer images of the breast if you have dense breast tissue. 3D mammography is an optional exam that anyone can have with a 2D mammogram. It doesn't replace or take the place of a 2D mammogram. 2D mammograms remain an effective screening test for all women.  Not all insurance companies cover the cost of a 3D mammogram. Check with your insurance.            Jan 11, 2019  8:30 AM CST   PHYSICAL with Daniella Duncan MD   [facility] [place] ([facility] [place])    [address] [place] [address] " "[address]  [place] [id number] [phone]              Who to contact     If you have questions or need follow up information about today's clinic visit or your schedule please contact [facility] [place] directly at [phone].  Normal or non-critical lab and imaging results will be communicated to you by MyChart, letter or phone within 4 business days after the clinic has received the results. If you do not hear from us within 7 days, please contact the clinic through [facility] or phone. If you have a critical or abnormal lab result, we will notify you by phone as soon as possible.  Submit refill requests through [facility] or call your pharmacy and they will forward the refill request to us. Please allow 3 business days for your refill to be completed.          Additional Information About Your Visit        In[facility] Information     [facility] gives you secure access to your electronic health record. If you see a primary care provider, you can also send messages to your care team and make appointments. If you have questions, please call your primary care clinic.  If you do not have a primary care provider, please call [phone] and they will assist you.        Care EveryWhere ID     This is your Care EveryWhere ID. This could be used by other organizations to access your [facility] medical records  [id number]        Your Vitals Were     Pulse Temperature Height Pulse Oximetry BMI (Body Mass Index)       88 98.2  F (36.8  C) (Oral) 5' 5\" (1.651 m) 98% 36.11 kg/m2        Blood Pressure from Last 3 Encounters:   11/28/18 124/76   12/15/17 120/76   11/29/17 104/74    Weight from Last 3 Encounters:   11/28/18 217 lb (98.4 kg)   12/15/17 209 lb (94.8 kg)   11/29/17 209 lb (94.8 kg)              We Performed the Following     CBC with platelets     Comprehensive metabolic panel     HIV Antigen Antibody Combo     NEUROLOGY ADULT REFERRAL     TSH with free T4 reflex     Vitamin B12          Where to get your medicines    "   Some of these will need a paper prescription and others can be bought over the counter.  Ask your nurse if you have questions.     Bring a paper prescription for each of these medications     ALPRAZolam 0.25 MG tablet    LORazepam 0.5 MG tablet          Primary Care Provider Office Phone # Fax #    Anastasia Norton PA-C [phone] [address]  [place] [address]        Equal Access to Services     SOHAN SEAY : Hadii aad ku hadasho Soomaali, waaxda luqadaha, qaybta kaalmada adeegyada, waxay idiin hayaan adeeg kharash la'jon . So [facility] [phone].    ATENCIÓN: Si habla español, tiene a alonzo disposición servicios gratuitos de asistencia lingüística. [facility] [phone].    We comply with applicable federal civil rights laws and [place] laws. We do not discriminate on the basis of race, color, national origin, age, disability, sex, sexual orientation, or gender identity.            Thank you!     Thank you for choosing [facility]  for your care. Our goal is always to provide you with excellent care. Hearing back from our patients is one way we can continue to improve our services. Please take a few minutes to complete the written survey that you may receive in the mail after your visit with us. Thank you!             Your Updated Medication List - Protect others around you: Learn how to safely use, store and throw away your medicines at www.disposemymeds.org.          This list is accurate as of 11/28/18  8:41 AM.  Always use your most recent med list.                   Brand Name Dispense Instructions for use Diagnosis    ALPRAZolam 0.25 MG tablet    XANAX    4 tablet    Take 30 minutes prior to daytime flight.    Fear of flying       LORazepam 0.5 MG tablet    ATIVAN    4 tablet    Take 0.5-1 tablets (0.25-0.5 mg) by mouth every 8 hours as needed for anxiety Take 30 minutes prior to departure.  Do not operate a vehicle after taking this medication    Fear of flying        SUMAtriptan 25 MG tablet    IMITREX    9 tablet    Take 1-2 tablets (25-50 mg) by mouth at onset of headache for migraine May repeat in 2 hours. Max 8 tablets/24 hours.    Migraine with aura and without status migrainosus, not intractable

## 2018-11-28 NOTE — PATIENT INSTRUCTIONS
Ok to take meds for flying anxiety as you've done in the past.   Unclear origin for tremor - history suggest this could be more of an action type tremor since it occurs mostly with typing and improves on repositioning. Provided reassurance it sounds less to be parkinson like as this tremor occurs at rest.   Check labs and consider neurology consult if worsening or ongoing concerns to confirm diagnosis.  Normal throat exam without enlargement of lymph nodes or thyroid. Unclear if low-grade viral process versus other. Considered strep testing, but pt declines. Could consider consult with ENT if persistent.

## 2018-11-28 NOTE — LETTER
December 3, 2018      Teresa Brian  [address] [facility] [address]        Dear ,    We are writing to inform you of your test results.    Normal red blood cell (hgb) levels, normal white blood cell count and normal platelet levels.  -Liver and gallbladder tests are normal (ALT,AST, Alk phos, bilirubin), kidney function is normal (Cr, GFR), sodium is normal, potassium is normal, calcium is normal, glucose is normal.  -TSH (thyroid stimulating hormone) level is normal which indicates normal thyroid function.  -HIV test is normal.  -Vitamin B12 level was normal.  Please follow-up with neurology as previously discussed in clinic. Have a good time on your trip!    Resulted Orders   CBC with platelets   Result Value Ref Range    WBC 6.7 4.0 - 11.0 10e9/L    RBC Count 4.53 3.8 - 5.2 10e12/L    Hemoglobin 13.7 11.7 - 15.7 g/dL    Hematocrit 41.3 35.0 - 47.0 %    MCV 91 78 - 100 fl    MCH 30.2 26.5 - 33.0 pg    MCHC 33.2 31.5 - 36.5 g/dL    RDW 12.3 10.0 - 15.0 %    Platelet Count 226 150 - 450 10e9/L   TSH with free T4 reflex   Result Value Ref Range    TSH 1.36 0.40 - 4.00 mU/L   Comprehensive metabolic panel   Result Value Ref Range    Sodium 139 133 - 144 mmol/L    Potassium 4.0 3.4 - 5.3 mmol/L    Chloride 107 94 - 109 mmol/L    Carbon Dioxide 25 20 - 32 mmol/L    Anion Gap 7 3 - 14 mmol/L    Glucose 90 70 - 99 mg/dL      Comment:      Fasting specimen    Urea Nitrogen 12 7 - 30 mg/dL    Creatinine 0.78 0.52 - 1.04 mg/dL    GFR Estimate 80 >60 mL/min/1.7m2      Comment:      Non  GFR Calc    GFR Estimate If Black >90 >60 mL/min/1.7m2      Comment:       GFR Calc    Calcium 8.7 8.5 - 10.1 mg/dL    Bilirubin Total 0.2 0.2 - 1.3 mg/dL    Albumin 3.6 3.4 - 5.0 g/dL    Protein Total 7.0 6.8 - 8.8 g/dL    Alkaline Phosphatase 61 40 - 150 U/L    ALT 22 0 - 50 U/L    AST 15 0 - 45 U/L   Vitamin B12   Result Value Ref Range    Vitamin B12 429 193 - 986 pg/mL   HIV  Antigen Antibody Combo   Result Value Ref Range    HIV Antigen Antibody Combo Nonreactive NR^Nonreactive          Comment:      HIV-1 p24 Ag & HIV-1/HIV-2 Ab Not Detected       If you have any questions or concerns, please call the clinic at the number listed above.       Sincerely,        Anastasia Norton PA-C

## 2018-11-28 NOTE — PROGRESS NOTES
SUBJECTIVE:   Teresa Brian is a 43 year old female who presents to clinic today for the following health issues:      1) Medication Followup of anxiety for flying; will be going to [place] a week from today.  [employer] concert with friends.  Needs to have xanax and ativan refilled. Takes short acting during the day and long-acting for at night.       Taking Medication as prescribed: yes    Side Effects:  None    Medication Helping Symptoms:  yes       2) Concern - left hand - shaking  Onset: for about a year off/on; R-handed but never has it on this.  Notices most if she is using the computer and typing, but at times has noticed it at rest. 90% while typing and only about 10% at rest.   Never really paid attention to how long it lasts. Estimates maybe a couple of minutes. If she repositions it will then stop. Usually occurs more in the afternoon.   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  Occurs once per week.  No hand injury or pain.   No weakness  Reports she has always had a very fine tremor of both hands at rest all her life.   Reports she will get numbness/tingling in her arm/hands if sleeps on her arms wrong.  Yesterday her whole R hand was N/T while sitting in the dental chair. Had no radicular symptoms at that time.   Hx of cataracts, but no vision loss otherwise.    Main concerns is that dad was diagnosed with Parkinson's in his late 30's early 40's.  FHx: mom has fibromyalgia  Maternal grandma with lupus.      Description:   Left hand shaking -     Progression of Symptoms:  same    Accompanying Signs & Symptoms:      Previous history of similar problem:       Precipitating factors:   Worsened by: as above    Alleviating factors:  Improved by: as above    Therapies Tried and outcome: none      3) Feels like her glands might be swollen, but has no obvious lumps. No sore throat,  difficulty swallowing or fevers. Kind of just always feels this way. Denies feeling like anything is stuck.   No chewing tobacco or smoking.     Problem list and histories reviewed & adjusted, as indicated.  Additional history: as documented    Patient Active Problem List   Diagnosis     CARDIOVASCULAR SCREENING; LDL GOAL LESS THAN 160     BMI 34.0-34.9,adult     Menorrhagia     History of cervical dysplasia     Migraine with aura and without status migrainosus, not intractable     Non morbid obesity due to excess calories     Past Surgical History:   Procedure Laterality Date     ARTHROSCOPIC RECONSTRUCTION ANTERIOR CRUCIATE LIGAMENT Right 10/2010    Reconstruction (ACL, MCL) and fibular fx     CATARACT IOL, RT/LT      5/2017, 9/2017     COLONOSCOPY       COLONOSCOPY N/A 6/27/2017    Procedure: COLONOSCOPY;;  Surgeon: Allan Olivier MD;  Location:  GI     COLPOSCOPY CERVIX, LOOP ELECTRODE BIOPSY, COMBINED  08/2011    squamous metaplasia     ESOPHAGOSCOPY, GASTROSCOPY, DUODENOSCOPY (EGD), COMBINED       ESOPHAGOSCOPY, GASTROSCOPY, DUODENOSCOPY (EGD), COMBINED N/A 6/27/2017    Procedure: COMBINED ESOPHAGOSCOPY, GASTROSCOPY, DUODENOSCOPY (EGD), BIOPSY SINGLE OR MULTIPLE;  ESOPHAGOSCOPY, GASTROSCOPY, DUODENOSCOPY (EGD with biopsies by cold forcep, Colonoscopy with biopsies by cold forcep.;  Surgeon: Allan Olivier MD;  Location:  GI     HC TOOTH EXTRACTION W/FORCEP  2000    wisdom teeth extraction     LAPAROSCOPY DIAGNOSTIC (GYN)  1992    negative ([facility])       Social History   Substance Use Topics     Smoking status: Never Smoker     Smokeless tobacco: Never Used     Alcohol use 1.2 oz/week     2 Standard drinks or equivalent per week      Comment: 2 drinks per week avg     Family History   Problem Relation Age of Onset     Neurologic Disorder Mother      Fibromyalgia. migraines     Neurologic Disorder Father      parkinson with early dementia     Colon Cancer No family hx of          Current Outpatient  "Prescriptions   Medication Sig Dispense Refill     ALPRAZolam (XANAX) 0.25 MG tablet Take 30 minutes prior to daytime flight. (Patient not taking: Reported on 12/15/2017) 4 tablet 0     LORazepam (ATIVAN) 0.5 MG tablet Take 0.5-1 tablets (0.25-0.5 mg) by mouth every 8 hours as needed for anxiety Take 30 minutes prior to departure.  Do not operate a vehicle after taking this medication (Patient not taking: Reported on 12/15/2017) 4 tablet 0     SUMAtriptan (IMITREX) 25 MG tablet Take 1-2 tablets (25-50 mg) by mouth at onset of headache for migraine May repeat in 2 hours. Max 8 tablets/24 hours. 9 tablet 1     Allergies   Allergen Reactions     Vicodin [Hydrocodone-Acetaminophen] Nausea and Vomiting       Reviewed and updated as needed this visit by clinical staff       Reviewed and updated as needed this visit by Provider         ROS:  Constitutional, HEENT, cardiovascular, pulmonary, GI, , musculoskeletal, neuro, skin, endocrine and psych systems are negative, except as otherwise noted.    OBJECTIVE:     /76 (BP Location: Right arm, Cuff Size: Adult Regular)  Pulse 88  Temp 98.2  F (36.8  C) (Oral)  Ht 5' 5\" (1.651 m)  Wt 217 lb (98.4 kg)  SpO2 98%  BMI 36.11 kg/m2  Body mass index is 36.11 kg/(m^2).  GENERAL: healthy, alert and no distress  EYES: Eyes grossly normal to inspection, PERRL and conjunctivae and sclerae normal  HENT: ear canals and TM's normal, nose and mouth without ulcers or lesions  NECK: no adenopathy, no asymmetry, masses, or scars and thyroid normal to palpation  MS: Pt demonstrates FROM of hands/wrists without appreciable difficulty. - tinels and phalen's testing.  NEURO: Normal 2+ UE DTRs. With both hands extended she does have a very slight tremor noted of both hands, but L appears a bit more prominent. Equal and symmetric  strength.   PSYCH: mentation appears normal, affect normal/bright    Diagnostic Test Results:  none     ASSESSMENT/PLAN:       ICD-10-CM    1. Tremor R25.1 " CBC with platelets     TSH with free T4 reflex     Comprehensive metabolic panel     Vitamin B12     NEUROLOGY ADULT REFERRAL   2. 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 Family history of Parkinson disease - Father early onset around age 30-40's Z82.0 NEUROLOGY ADULT REFERRAL   4. Encounter for screening for HIV Z11.4 HIV Antigen Antibody Combo   5. Need for influenza vaccination Z23    6. Throat symptom R68.89    History suggests potential action tremor and let pt know that this does not seem consistent with Parkinson's.   Check labs and if normal also reviewed potential for other things like MS. Neurology referral given for further evaluation and additional work-up if deemed appropriate at that time.  Risks/appropriate use of benzodiazepines well known to pt - given temp script for flight anxiety as has done well with these in the past.  She also mentioned this non-specific throat symptom like she feeling like her external throat feels unusual, but has had no fevers, respiratory sx, sore throat, difficulty swallowing. She mainly just wanted to have it checked and I provided reassurance that there are no abnormalities that I can tell. Will check thyroid, but encouraged to follow-up with persistence and could consider ENT consult.  See Patient Instructions  Pt in agreement with plan.    Patient Instructions   Ok to take meds for flying anxiety as you've done in the past.   Unclear origin for tremor - history suggest this could be more of an action type tremor since it occurs mostly with typing and improves on repositioning. Provided reassurance it sounds less to be parkinson like as this tremor occurs at rest.   Check labs and consider neurology consult if worsening or ongoing concerns to confirm diagnosis.  Normal throat exam without enlargement of lymph nodes or thyroid. Unclear if low-grade viral process versus other. Considered strep testing, but pt  declines. Could consider consult with ENT if persistent.       Anastasia Norton PA-C  [facility]

## 2018-12-01 NOTE — PROGRESS NOTES
Please call or write patient with the following results:    -Normal red blood cell (hgb) levels, normal white blood cell count and normal platelet levels.  -Liver and gallbladder tests are normal (ALT,AST, Alk phos, bilirubin), kidney function is normal (Cr, GFR), sodium is normal, potassium is normal, calcium is normal, glucose is normal.  -TSH (thyroid stimulating hormone) level is normal which indicates normal thyroid function.  -HIV test is normal.  -Vitamin B12 level was normal.  Please follow-up with neurology as previously discussed in clinic. Have a good time on your trip!    Electronically Signed By: Anastasia Norton PA-C

## 2018-12-04 ENCOUNTER — TELEPHONE (OUTPATIENT)
Dept: FAMILY MEDICINE | Facility: CLINIC | Age: 43
End: 2018-12-04

## 2018-12-04 NOTE — TELEPHONE ENCOUNTER
Baljeet from [facility] pharmacy calling regarding Xanax Rx dose and frequency.  Huddled with provider who stated to take 1 tablet PRN 30 minutes before flight.  Pharmacist stated understanding.    JOANN Alex, RN  [employer] [facility]

## 2018-12-27 ENCOUNTER — OFFICE VISIT (OUTPATIENT)
Dept: URGENT CARE | Facility: URGENT CARE | Age: 43
End: 2018-12-27
Payer: COMMERCIAL

## 2018-12-27 VITALS
TEMPERATURE: 97.7 F | WEIGHT: 217 LBS | HEART RATE: 74 BPM | BODY MASS INDEX: 36.11 KG/M2 | SYSTOLIC BLOOD PRESSURE: 110 MMHG | DIASTOLIC BLOOD PRESSURE: 80 MMHG | OXYGEN SATURATION: 98 %

## 2018-12-27 DIAGNOSIS — R11.0 NAUSEA: ICD-10-CM

## 2018-12-27 DIAGNOSIS — R42 VERTIGO: Primary | ICD-10-CM

## 2018-12-27 PROCEDURE — 96372 THER/PROPH/DIAG INJ SC/IM: CPT | Performed by: FAMILY MEDICINE

## 2018-12-27 PROCEDURE — 99213 OFFICE O/P EST LOW 20 MIN: CPT | Mod: 25 | Performed by: FAMILY MEDICINE

## 2018-12-27 RX ORDER — MECLIZINE HYDROCHLORIDE 25 MG/1
25 TABLET ORAL 3 TIMES DAILY PRN
Qty: 30 TABLET | Refills: 0 | Status: SHIPPED | OUTPATIENT
Start: 2018-12-27 | End: 2019-04-02

## 2018-12-27 RX ORDER — HYDROXYZINE HYDROCHLORIDE 50 MG/ML
50 INJECTION, SOLUTION INTRAMUSCULAR ONCE
Status: COMPLETED | OUTPATIENT
Start: 2018-12-27 | End: 2018-12-27

## 2018-12-27 RX ADMIN — HYDROXYZINE HYDROCHLORIDE 50 MG: 50 INJECTION, SOLUTION INTRAMUSCULAR at 14:27

## 2018-12-27 NOTE — PROGRESS NOTES
SUBJECTIVE:   Teresa complains of room spinning/falling/movement, off balance for today.   Timing: sudden onset and still presentchange in  position and movement of head.  Quality: room spinning/falling/movement, off balance.  Modifying factors:changing position, movement of head.  Associated symptoms:negative;  does not interfere with activities of daily living;  denies any previous problems with similar symptoms.     Past Medical History:   Diagnosis Date     Asthma      CARDIOVASCULAR SCREENING; LDL GOAL LESS THAN 160      Cervical dysplasia 2010 2009-colpo @ [facility]-->JOANNA 1; persistent LGSIL/ASC-US paps; colposcopy 12/2010 with JOANNA 1, focal JOANNA 2 and benign ECC     Condyloma acuminatum 4/2009    perirecal BX      Herpes simplex virus infection     HSV-1     Menarche age 15    cycles q 3-4 x 3-4 d w cramps     Menorrhagia      Migraine      Personal history of cervical dysplasia 2010    (2009) JOANNA I; (2010) JOANNA I and focal JOANNA II     Past Surgical History:   Procedure Laterality Date     ARTHROSCOPIC RECONSTRUCTION ANTERIOR CRUCIATE LIGAMENT Right 10/2010    Reconstruction (ACL, MCL) and fibular fx     CATARACT IOL, RT/LT      5/2017, 9/2017     COLONOSCOPY       COLONOSCOPY N/A 6/27/2017    Procedure: COLONOSCOPY;;  Surgeon: Allan Olivier MD;  Location:  GI     COLPOSCOPY CERVIX, LOOP ELECTRODE BIOPSY, COMBINED  08/2011    squamous metaplasia     ESOPHAGOSCOPY, GASTROSCOPY, DUODENOSCOPY (EGD), COMBINED       ESOPHAGOSCOPY, GASTROSCOPY, DUODENOSCOPY (EGD), COMBINED N/A 6/27/2017    Procedure: COMBINED ESOPHAGOSCOPY, GASTROSCOPY, DUODENOSCOPY (EGD), BIOPSY SINGLE OR MULTIPLE;  ESOPHAGOSCOPY, GASTROSCOPY, DUODENOSCOPY (EGD with biopsies by cold forcep, Colonoscopy with biopsies by cold forcep.;  Surgeon: Allan Olivier MD;  Location:  GI     HC TOOTH EXTRACTION W/FORCEP  2000    wisdom teeth extraction     LAPAROSCOPY DIAGNOSTIC (GYN)  1992    negative ([place])     Allergies:  Allergies    Allergen Reactions     Vicodin [Hydrocodone-Acetaminophen] Nausea and Vomiting     Patient Active Problem List   Diagnosis     CARDIOVASCULAR SCREENING; LDL GOAL LESS THAN 160     BMI 34.0-34.9,adult     Menorrhagia     History of cervical dysplasia     Migraine with aura and without status migrainosus, not intractable     Non morbid obesity due to excess calories     Family history of Parkinson disease - Father early onset around age 30-40's     Current Outpatient Medications   Medication Sig     meclizine (ANTIVERT) 25 MG tablet Take 1 tablet (25 mg) by mouth 3 times daily as needed for dizziness     ALPRAZolam (XANAX) 0.25 MG tablet Take 30 minutes prior to daytime flight. (Patient not taking: Reported on 12/27/2018)     LORazepam (ATIVAN) 0.5 MG tablet Take 0.5-1 tablets (0.25-0.5 mg) by mouth every 8 hours as needed for anxiety Take 30 minutes prior to departure.  Do not operate a vehicle after taking this medication (Patient not taking: Reported on 12/27/2018)     SUMAtriptan (IMITREX) 25 MG tablet Take 1-2 tablets (25-50 mg) by mouth at onset of headache for migraine May repeat in 2 hours. Max 8 tablets/24 hours. (Patient not taking: Reported on 12/27/2018)     No current facility-administered medications for this visit.      OBJECTIVE:  /80   Pulse 74   Temp 97.7  F (36.5  C) (Oral)   Wt 98.4 kg (217 lb)   SpO2 98%   BMI 36.11 kg/m    Appears well, in no apparent distress.   EARS:  normal.   NECK:  Supple. No adenopathy or masses in the neck or supraclavicular regions. Cranial nerves are normal.   EYES: Fundi are normal with sharp disc margins, no papilledema, hemorrhages or exudates noted. MODESTA. EOM's intact.   NEURO: DTR's normal and symmetric. Mental status normal. Gait and station normal. Romberg negative. Cerebellar function is normal. No internuclear ophthalmoplegia.   Pulse regular. Rapid changes in position during the exam do precipitate brief dizziness with  nystagmus.    ASSESSMENT:  Acute labyrinthitis (vestibular neuronitis)    PLAN:  The patient is reassured that these symptoms do not appear to represent a serious or threatening condition. This is generally a self-limited temporary but uncomfortable situation. Rest, avoid potentially dangerous activities (such as driving or working with machinery or at heights), use OTC Meclizine prn. Asked to call if develops other symptoms, such as alterations of speech, swallowing, vision, motor or sensory systems, or if dizziness persists or worsens.

## 2018-12-27 NOTE — PATIENT INSTRUCTIONS
Patient Education     Benign Paroxysmal Positional Vertigo     Your health care provider may move your head in certain ways to treat your BPPV.     Benign paroxysmal positional vertigo (BPPV) is a problem with the inner ear. The inner ear contains the vestibular system. This system is what helps you keep your balance. BPPV causes a feeling of spinning. It is a common problem of the vestibular system.  Understanding the vestibular system  The vestibular system of the ear is made up of very tiny parts. They include the utricle, saccule, and semicircular canals. The utricle is a tiny organ that contains calcium crystals. In some people, the crystals can move into the semicircular canals. When this happens, the system no longer works as it should. This causes BPPV. Benign means it is not life threatening. Paroxysmal means it happens suddenly. Positional means that it happens when you move your head. Vertigo is a feeling of spinning.  What causes BPPV?  Causes include injury to your head or neck. Other problems with the vestibular system may cause BPPV. In many people, the cause of BPPV is not known.  Symptoms of BPPV  You many have repeated feelings of spinning (vertigo). The vertigo usually lasts less than 1 minute. Some movements, such as rolling over in bed, can bring on vertigo.  Diagnosing BPPV  Your primary healthcare provider may diagnose and treat your BPPV. Or you may see an ear, nose, and throat doctor (otolaryngologist). In some cases, you may see a nervous system doctor (neurologist).  The healthcare provider will ask about your symptoms and your medical history. He or she will examine you. You may have hearing and balance tests. As part of the exam, your healthcare provider may have you move your head and body in certain ways. If you have BPPV, the movements can bring on vertigo. Your provider will also look for abnormal movements of your eyes. You may have other tests to check your vestibular or nervous  systems.  Treatment for BPPV  Your healthcare provider may try to move the calcium crystals. This is done by having you move your head and neck in certain ways. This treatment is safe and often works well. You may also be told to do these movements at home. You may still have vertigo for a few weeks. Your healthcare provider will recheck your symptoms, usually in about a month. Special physical therapy may also be part of treatment. In rare cases, surgery may be needed for BPPV that does not go away.     When to call the healthcare provider  Call your healthcare provider right away if you have any of these:    Symptoms that do not go away with treatment    Symptoms that get worse    New symptoms   Date Last Reviewed: 5/1/2017 2000-2018 [employer]. [address] [place] [address], [place] [address]. All rights reserved. This information is not intended as a substitute for professional medical care. Always follow your healthcare professional's instructions.

## 2019-01-30 ENCOUNTER — OFFICE VISIT (OUTPATIENT)
Dept: OBGYN | Facility: CLINIC | Age: 44
End: 2019-01-30
Payer: COMMERCIAL

## 2019-01-30 ENCOUNTER — ANCILLARY PROCEDURE (OUTPATIENT)
Dept: MAMMOGRAPHY | Facility: CLINIC | Age: 44
End: 2019-01-30
Payer: COMMERCIAL

## 2019-01-30 VITALS
HEIGHT: 66 IN | SYSTOLIC BLOOD PRESSURE: 118 MMHG | DIASTOLIC BLOOD PRESSURE: 62 MMHG | WEIGHT: 213 LBS | BODY MASS INDEX: 34.23 KG/M2

## 2019-01-30 DIAGNOSIS — N92.0 MENORRHAGIA WITH REGULAR CYCLE: ICD-10-CM

## 2019-01-30 DIAGNOSIS — Z12.31 VISIT FOR SCREENING MAMMOGRAM: ICD-10-CM

## 2019-01-30 DIAGNOSIS — Z01.419 ENCOUNTER FOR GYNECOLOGICAL EXAMINATION WITHOUT ABNORMAL FINDING: Primary | ICD-10-CM

## 2019-01-30 PROCEDURE — 99396 PREV VISIT EST AGE 40-64: CPT | Performed by: OBSTETRICS & GYNECOLOGY

## 2019-01-30 PROCEDURE — 77067 SCR MAMMO BI INCL CAD: CPT | Mod: TC

## 2019-01-30 PROCEDURE — 77063 BREAST TOMOSYNTHESIS BI: CPT | Mod: TC

## 2019-01-30 ASSESSMENT — ANXIETY QUESTIONNAIRES
IF YOU CHECKED OFF ANY PROBLEMS ON THIS QUESTIONNAIRE, HOW DIFFICULT HAVE THESE PROBLEMS MADE IT FOR YOU TO DO YOUR WORK, TAKE CARE OF THINGS AT HOME, OR GET ALONG WITH OTHER PEOPLE: NOT DIFFICULT AT ALL
5. BEING SO RESTLESS THAT IT IS HARD TO SIT STILL: NOT AT ALL
3. WORRYING TOO MUCH ABOUT DIFFERENT THINGS: NOT AT ALL
7. FEELING AFRAID AS IF SOMETHING AWFUL MIGHT HAPPEN: NOT AT ALL
2. NOT BEING ABLE TO STOP OR CONTROL WORRYING: NOT AT ALL
GAD7 TOTAL SCORE: 0
1. FEELING NERVOUS, ANXIOUS, OR ON EDGE: NOT AT ALL
6. BECOMING EASILY ANNOYED OR IRRITABLE: NOT AT ALL

## 2019-01-30 ASSESSMENT — PATIENT HEALTH QUESTIONNAIRE - PHQ9
SUM OF ALL RESPONSES TO PHQ QUESTIONS 1-9: 0
5. POOR APPETITE OR OVEREATING: NOT AT ALL

## 2019-01-30 ASSESSMENT — MIFFLIN-ST. JEOR: SCORE: 1633.94

## 2019-01-30 NOTE — PROGRESS NOTES
Teresa is a 43 year old  female who presents for annual exam.     Besides routine health maintenance, she has no other health concerns today .    HPI:  Here today for yearly exam --doing well.  Regular 21d cycles --heavy bleeding with cramping for the first 3 days and then tapers quickly.  Interested in options to stop or minimize her periods due to frequent schedule and heaviness in the first few days.  No intermenstrual bleeding or spotting.  Had used depo for years but was tired of weight issues; also tried mirena IUD but had removed after 2yrs due to partial expulsion.  +SA --no issues.  Partner has vasectomy.  No bowel/bladder issues.  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  Now working 8-5 M-F as  for [employer] --loves the schedule!  -staying active but not much formal exercise  +mammo already today; +SBE --no issues  PCP --[facility] clinic as needed; had bloodwork with them; has also seen GI in the past for extensive workup due to GI issues --normal workup and no longer seeing them regularily  -has had rough year so far; partner has lost 3 close people --his dad, his godmother and best friend      GYNECOLOGIC HISTORY:    Patient's last menstrual period was 2019 (exact date).  Her current contraception method is: none.  She  reports that  has never smoked. she has never used smokeless tobacco.    Patient is sexually active.  STD testing offered?  Declined  Last PHQ-9 score on record =   PHQ-9 SCORE 2019   PHQ-9 Total Score 0     Last GAD7 score on record =   GARRY-7 SCORE 2019   Total Score 0     Alcohol Score = 3    HEALTH MAINTENANCE:  Cholesterol:   Cholesterol   Date Value Ref Range Status   10/23/2015 167 <200 mg/dL Final     Comment:     LDL Cholesterol is the primary guide to therapy.   The NCEP recommends further evaluation of: patients with cholesterol greater   than 200 mg/dL  if additional risk factors are present, cholesterol greater   than   240 mg/dL, triglycerides greater than 150 mg/dL, or HDL less than 40 mg/dL.      10/23/15   Total= 167, Triglycerides=108, HDL=52, LDL=93, FBS=83, TSH=2.11  Last Mammo: one year ago, Result: normal, Next Mammo: today   Pap:   Lab Results   Component Value Date    PAP NIL 2016 WNL HPV (-)neg  Colonoscopy:  17, Result: normal, Next Colonoscopy: 8 years.  Dexa:  NA    Health maintenance updated:  yes    HISTORY:  Obstetric History       T0      L0     SAB0   TAB0   Ectopic0   Multiple0   Live Births0           Patient Active Problem List   Diagnosis     CARDIOVASCULAR SCREENING; LDL GOAL LESS THAN 160     BMI 34.0-34.9,adult     Menorrhagia     History of cervical dysplasia     Migraine with aura and without status migrainosus, not intractable     Non morbid obesity due to excess calories     Family history of Parkinson disease - Father early onset around age 30-40's     S/P ACL reconstruction     Past Surgical History:   Procedure Laterality Date     ARTHROSCOPIC RECONSTRUCTION ANTERIOR CRUCIATE LIGAMENT Right 10/2010    Reconstruction (ACL, MCL) and fibular fx     CATARACT IOL, RT/LT      2017, 2017     COLONOSCOPY       COLONOSCOPY N/A 2017    Procedure: COLONOSCOPY;;  Surgeon: Allan Olivier MD;  Location:  GI     COLPOSCOPY CERVIX, LOOP ELECTRODE BIOPSY, COMBINED  2011    squamous metaplasia     ESOPHAGOSCOPY, GASTROSCOPY, DUODENOSCOPY (EGD), COMBINED       ESOPHAGOSCOPY, GASTROSCOPY, DUODENOSCOPY (EGD), COMBINED N/A 2017    Procedure: COMBINED ESOPHAGOSCOPY, GASTROSCOPY, DUODENOSCOPY (EGD), BIOPSY SINGLE OR MULTIPLE;  ESOPHAGOSCOPY, GASTROSCOPY, DUODENOSCOPY (EGD with biopsies by cold forcep, Colonoscopy with biopsies by cold forcep.;  Surgeon: Allan Olivier MD;  Location:  GI     HC TOOTH EXTRACTION W/FORCEP      wisdom teeth extraction     LAPAROSCOPY DIAGNOSTIC (GYN)    "   negative ([facility])      Social History     Tobacco Use     Smoking status: Never Smoker     Smokeless tobacco: Never Used   Substance Use Topics     Alcohol use: Yes     Alcohol/week: 1.2 oz     Types: 2 Standard drinks or equivalent per week     Comment: 2 drinks per week avg      Problem (# of Occurrences) Relation (Name,Age of Onset)    Neurologic Disorder (2) Mother ( ): Fibromyalgia. migraines, Father ( ): parkinson with early dementia       Negative family history of: Colon Cancer, Thyroid Disease, Diabetes, Hypertension, Hyperlipidemia, Breast Cancer            Current Outpatient Medications   Medication Sig     ALPRAZolam (XANAX) 0.25 MG tablet Take 30 minutes prior to daytime flight.     LORazepam (ATIVAN) 0.5 MG tablet Take 0.5-1 tablets (0.25-0.5 mg) by mouth every 8 hours as needed for anxiety Take 30 minutes prior to departure.  Do not operate a vehicle after taking this medication     SUMAtriptan (IMITREX) 25 MG tablet Take 1-2 tablets (25-50 mg) by mouth at onset of headache for migraine May repeat in 2 hours. Max 8 tablets/24 hours.     No current facility-administered medications for this visit.      Allergies   Allergen Reactions     Vicodin [Hydrocodone-Acetaminophen] Nausea and Vomiting       Past medical, surgical, social and family histories were reviewed and updated in EPIC.    ROS:   12 point review of systems negative other than symptoms noted below.  Constitutional: Weight Gain    EXAM:  /62   Ht 1.67 m (5' 5.75\")   Wt 96.6 kg (213 lb)   LMP 01/11/2019 (Exact Date)   Breastfeeding? No   BMI 34.64 kg/m     BMI: Body mass index is 34.64 kg/m .    PHYSICAL EXAM:  Constitutional:  Appearance: Well nourished, well developed, alert, in no acute distress  Neck:  Lymph Nodes:  No lymphadenopathy present    Thyroid:  Gland size normal, nontender, no nodules or masses present  on palpation  Chest:  Respiratory Effort:  Breathing unlabored  Cardiovascular:    Heart: Auscultation:  " Regular rate, normal rhythm, no murmurs present  Breasts: Inspection of Breasts:  No lymphadenopathy present., Palpation of Breasts and Axillae:  No masses present on palpation, no breast tenderness., Axillary Lymph Nodes:  No lymphadenopathy present. and No nodularity, asymmetry or nipple discharge bilaterally.  Gastrointestinal:   Abdominal Examination:  Abdomen nontender to palpation, tone normal without rigidity or guarding, no masses present, umbilicus without lesions   Liver and Spleen:  No hepatomegaly present, liver nontender to palpation    Hernias:  No hernias present  Lymphatic: Lymph Nodes:  No other lymphadenopathy present  Skin:  General Inspection:  No rashes present, no lesions present, no areas of  discoloration    Genitalia and Groin:  No rashes present, no lesions present, no areas of  discoloration, no masses present  Neurologic/Psychiatric:    Mental Status:  Oriented X3     Pelvic Exam:  External Genitalia:     Normal appearance for age, no discharge present, no tenderness present, no inflammatory lesions present, color normal  Vagina:     Normal vaginal vault without central or paravaginal defects, no discharge present, no inflammatory lesions present, no masses present  Bladder:     Nontender to palpation  Urethra:   Urethral Body:  Urethra palpation normal, urethra structural support normal   Urethral Meatus:  No erythema or lesions present  Cervix:     Appearance healthy, no lesions present, nontender to palpation, no bleeding present  Uterus:     Uterus: firm, normal sized and nontender, midplane in position.   Adnexa:     No adnexal tenderness present, no adnexal masses present  Perineum:     Perineum within normal limits, no evidence of trauma, no rashes or skin lesions present  Anus:     Anus within normal limits, no hemorrhoids present  Inguinal Lymph Nodes:     No lymphadenopathy present  Pubic Hair:     Normal pubic hair distribution for age  Genitalia and Groin:     No rashes  present, no lesions present, no areas of discoloration, no masses present      COUNSELING:   Reviewed preventive health counseling, as reflected in patient instructions  Special attention given to:        Regular exercise       Healthy diet/nutrition       (Di)menopause management    BMI: Body mass index is 34.64 kg/m .  Weight management plan: Discussed healthy diet and exercise guidelines Patient was referred to their PCP to discuss a diet and exercise plan.    ASSESSMENT:  43 year old female with satisfactory annual exam.    ICD-10-CM    1. Encounter for gynecological examination without abnormal finding Z01.419    2. Menorrhagia with regular cycle N92.0        PLAN:  Patient Instructions   Follow up with your primary care provider for your other medical problems.  Continue self breast exam.  Increase physical activity and exercise.  Usual safety and preventative measures counseling done.  BMI >25  Weight loss encouraged.  Last pap smear (2016) was normal and negative for the DNA of high risk HPV subtypes.  No pap was obtained this year.  This was discussed with the patient and she agrees with the plan.  Will review information on endometrial ablation; if interested, will return for pelvic ultrasound and endometrial biopsy      Daniella Duncan MD

## 2019-01-30 NOTE — PATIENT INSTRUCTIONS
Follow up with your primary care provider for your other medical problems.  Continue self breast exam.  Increase physical activity and exercise.  Usual safety and preventative measures counseling done.  BMI >25  Weight loss encouraged.  Last pap smear (2016) was normal and negative for the DNA of high risk HPV subtypes.  No pap was obtained this year.  This was discussed with the patient and she agrees with the plan.  Will review information on endometrial ablation; if interested, will return for pelvic ultrasound and endometrial biopsy

## 2019-01-31 ASSESSMENT — ANXIETY QUESTIONNAIRES: GAD7 TOTAL SCORE: 0

## 2019-04-01 ENCOUNTER — MYC MEDICAL ADVICE (OUTPATIENT)
Dept: OBGYN | Facility: CLINIC | Age: 44
End: 2019-04-01

## 2019-04-02 ENCOUNTER — OFFICE VISIT (OUTPATIENT)
Dept: FAMILY MEDICINE | Facility: CLINIC | Age: 44
End: 2019-04-02
Payer: COMMERCIAL

## 2019-04-02 VITALS
OXYGEN SATURATION: 97 % | DIASTOLIC BLOOD PRESSURE: 78 MMHG | HEART RATE: 91 BPM | SYSTOLIC BLOOD PRESSURE: 122 MMHG | TEMPERATURE: 98.3 F | WEIGHT: 215 LBS | BODY MASS INDEX: 35.82 KG/M2 | HEIGHT: 65 IN

## 2019-04-02 DIAGNOSIS — G43.109 MIGRAINE WITH AURA AND WITHOUT STATUS MIGRAINOSUS, NOT INTRACTABLE: ICD-10-CM

## 2019-04-02 DIAGNOSIS — F40.243 FEAR OF FLYING: ICD-10-CM

## 2019-04-02 DIAGNOSIS — H93.12 TINNITUS, LEFT: ICD-10-CM

## 2019-04-02 DIAGNOSIS — H61.22 IMPACTED CERUMEN OF LEFT EAR: Primary | ICD-10-CM

## 2019-04-02 PROCEDURE — 99214 OFFICE O/P EST MOD 30 MIN: CPT | Mod: 25 | Performed by: PHYSICIAN ASSISTANT

## 2019-04-02 PROCEDURE — 69209 REMOVE IMPACTED EAR WAX UNI: CPT | Mod: 50 | Performed by: PHYSICIAN ASSISTANT

## 2019-04-02 RX ORDER — LORAZEPAM 0.5 MG/1
0.25-0.5 TABLET ORAL EVERY 8 HOURS PRN
Qty: 4 TABLET | Refills: 0 | Status: SHIPPED | OUTPATIENT
Start: 2019-04-02 | End: 2021-02-15

## 2019-04-02 RX ORDER — ALPRAZOLAM 0.25 MG
TABLET ORAL
Qty: 4 TABLET | Refills: 0 | Status: SHIPPED | OUTPATIENT
Start: 2019-04-02 | End: 2021-02-15

## 2019-04-02 RX ORDER — SUMATRIPTAN 25 MG/1
25-50 TABLET, FILM COATED ORAL
Qty: 9 TABLET | Refills: 1 | Status: SHIPPED | OUTPATIENT
Start: 2019-04-02 | End: 2021-02-15

## 2019-04-02 ASSESSMENT — MIFFLIN-ST. JEOR: SCORE: 1631.11

## 2019-04-02 NOTE — PROGRESS NOTES
"  SUBJECTIVE:   Teresa Brian is a 43 year old female who presents to clinic today for the following health issues:      1) Medication Followup - for anxiety med for flying; boyfriend's father passed away age 60. Found paperwork that he knew he had cancer for a whole year and didn't tell anyone. then his mom had a heart attack. After this happened, their neighbor suggested they stay at their condo to help de-stress.      2) Refill migraines med - imitrex. Never wound up needing refill from 2018, but now script has  so needs new one. Also has a hx of tension type HA, but migraines usually will only get 1-2x per month.   Gets aura of \"hazziness\" in her vision and then will develop \"a ton of pressure\" in occipital region of her head with light sensitive. Sometimes has nausea and vomiting. Not often, but has happened in the past. \"Debilitating\" severity.   Usually just takes 25mg of imitrex.  Tension-type HA is duller type discomfort around her eyes bilaterally. \"nothing that I can't stand\" and more \"irritating\" than anything else. Usually will take 2 tylenol or ibuprofen 2x per week.   Has really tried to cut back on taking OTC med use - switches off ibuprofen/tylenol. Had taking more if Dec-Feb for a tooth problem, but no longer requiring it to this degree.     3)Concern - ringing in her ears; started on the L, but now feels like it's \"in my head.\" Internet wasn't working so she was on speaker phone then hung up and noticed it ever since. Had to use headphones and music to drown it out.  No recent concerns or significant loud noise exposure recently.  Does shoot guns with hunting - wears hearing protection at that time.   Denies any illness or URI sx. Allergies has been acting up a little bit.  Wears ear plugs nightly due to significant other snoring.   No head trauma.   No vertigo.   Maybe has an \"empty sound\", but not necessarily hearing loss.     Onset: started yesterday    Description:   Ringing in ears "     Intensity: moderate    Progression of Symptoms:  same    Accompanying Signs & Symptoms: none       Previous history of similar problem: rare episode of ringing in the past, but nothing consistently.       Precipitating factors:   Worsened by: none    Alleviating factors:  Improved by: none    Therapies Tried and outcome: took shower to see if something came out, but didn't resolve it.      Problem list and histories reviewed & adjusted, as indicated.  Additional history: as documented    Patient Active Problem List   Diagnosis     CARDIOVASCULAR SCREENING; LDL GOAL LESS THAN 160     BMI 34.0-34.9,adult     Menorrhagia     History of cervical dysplasia     Migraine with aura and without status migrainosus, not intractable     Non morbid obesity due to excess calories     Family history of Parkinson disease - Father early onset around age 30-40's     S/P ACL reconstruction     Past Surgical History:   Procedure Laterality Date     ARTHROSCOPIC RECONSTRUCTION ANTERIOR CRUCIATE LIGAMENT Right 10/2010    Reconstruction (ACL, MCL) and fibular fx     CATARACT IOL, RT/LT      5/2017, 9/2017     COLONOSCOPY       COLONOSCOPY N/A 6/27/2017    Procedure: COLONOSCOPY;;  Surgeon: Allan Olivier MD;  Location:  GI     COLPOSCOPY CERVIX, LOOP ELECTRODE BIOPSY, COMBINED  08/2011    squamous metaplasia     ESOPHAGOSCOPY, GASTROSCOPY, DUODENOSCOPY (EGD), COMBINED       ESOPHAGOSCOPY, GASTROSCOPY, DUODENOSCOPY (EGD), COMBINED N/A 6/27/2017    Procedure: COMBINED ESOPHAGOSCOPY, GASTROSCOPY, DUODENOSCOPY (EGD), BIOPSY SINGLE OR MULTIPLE;  ESOPHAGOSCOPY, GASTROSCOPY, DUODENOSCOPY (EGD with biopsies by cold forcep, Colonoscopy with biopsies by cold forcep.;  Surgeon: Allan Olivier MD;  Location:  GI     HC TOOTH EXTRACTION W/FORCEP  2000    wisdom teeth extraction     LAPAROSCOPY DIAGNOSTIC (GYN)  1992    negative ([facility])       Social History     Tobacco Use     Smoking status: Never Smoker     Smokeless tobacco: Never  "Used   Substance Use Topics     Alcohol use: Yes     Alcohol/week: 1.2 oz     Types: 2 Standard drinks or equivalent per week     Comment: 2 drinks per week avg     Family History   Problem Relation Age of Onset     Neurologic Disorder Mother         Fibromyalgia. migraines     Neurologic Disorder Father         parkinson with early dementia     Colon Cancer No family hx of      Thyroid Disease No family hx of      Diabetes No family hx of      Hypertension No family hx of      Hyperlipidemia No family hx of      Breast Cancer No family hx of          Current Outpatient Medications   Medication Sig Dispense Refill     ALPRAZolam (XANAX) 0.25 MG tablet Take 30 minutes prior to daytime flight. 4 tablet 0     LORazepam (ATIVAN) 0.5 MG tablet Take 0.5-1 tablets (0.25-0.5 mg) by mouth every 8 hours as needed for anxiety Take 30 minutes prior to departure.  Do not operate a vehicle after taking this medication 4 tablet 0     SUMAtriptan (IMITREX) 25 MG tablet Take 1-2 tablets (25-50 mg) by mouth at onset of headache for migraine May repeat in 2 hours. Max 8 tablets/24 hours. 9 tablet 1     Allergies   Allergen Reactions     Vicodin [Hydrocodone-Acetaminophen] Nausea and Vomiting       Reviewed and updated as needed this visit by clinical staff       Reviewed and updated as needed this visit by Provider         ROS:  Constitutional, HEENT, cardiovascular, pulmonary, gi and gu, psych, neuro systems are negative, except as otherwise noted.    OBJECTIVE:     /78 (BP Location: Right arm, Cuff Size: Adult Large)   Pulse 91   Temp 98.3  F (36.8  C) (Oral)   Ht 1.651 m (5' 5\")   Wt 97.5 kg (215 lb)   SpO2 97%   BMI 35.78 kg/m    Body mass index is 35.78 kg/m .  GENERAL: healthy, alert and no distress  EYES: Eyes grossly normal to inspection, PERRL and conjunctivae and sclerae normal  HENT: normal cephalic/atraumatic, Both ears originally occluded with wax bilaterally, but L>R. This was resolved with ear lavage by my " MA. Re-inspection reveals normal: no effusions, no erythema, normal landmarks, nose and mouth without ulcers or lesions, oropharynx clear and oral mucous membranes moist  NECK: no adenopathy and no asymmetry, masses, or scars  NEURO: Normal strength and tone, mentation intact and speech normal  PSYCH: mentation appears normal, affect normal/bright    Diagnostic Test Results:  none     ASSESSMENT/PLAN:       ICD-10-CM    1. Impacted cerumen of left ear H61.22    2. 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 Migraine with aura and without status migrainosus, not intractable G43.109 SUMAtriptan (IMITREX) 25 MG tablet   4. Tinnitus, left H93.12    See Patient Instructions  Patient in agreement with plan.     Patient Instructions   Tinnitus may have been from wax impaction.  Now resolved with ear lavage by my MA.  If no improvement, could consider ENT consult.   Ok to take anti-anxiety meds for flight only - pt well aware of risks and appropriate use.   Migraines stable with use of imitrex. Continue without changes.     Anastasia Norton PA-C  [facility]

## 2019-04-02 NOTE — LETTER
My Migraine Action Plan      Date: 4/2/2019     My Name: Teresa Brian   YOB: 1975  My Pharmacy:    [id number] - [place], [place] - [address] [address]  [id number] [place] - [address]  [id number] - [place], [place] - [address]       My (Preventative) Control Medicine: none        My Rescue Medicine: imitrex    My Doctor: Anastasia Norton     My Clinic: [facility] [place]  [address] [address] [phone]        GREEN ZONE = Good Control    My headache plan is working.   I can do what I need to do.           I WILL:     ? Keep managing my triggers.  ? Write in my migraine diary each time I have a headache.  ? Keep taking my preventive (controller) medicine daily.  ? Take my relief and rescue medicine as needed.             YELLOW ZONE = Not Enough Control    My headache plan isn t always working.   My headaches keep me from doing   some of the things I need to do.       I WILL:     ? Set goals to control my triggers and act on them.  ? Write in my migraine diary each time I have a headache and review it for                      patterns or new triggers.  ? Keep taking my preventive (controller) medicine daily.  ? Take my relief and rescue medicine as needed.  ? Call my doctor or clinic at if I stay in the Yellow Zone.             RED ZONE = Poor or No Control    My headache plan has  failed. I can t do anything  when I have one. My  medicines aren t working.           I WILL:   ? Set goals to control my triggers and act on them.  ? Write in my migraine diary each time I have a headache and review it for                      patterns or new triggers.  ? Keep taking my preventive (controller) medicine daily.  ? Take my relief and rescue medicine as needed.  ? Call my doctor or clinic or go to urgent care or an ER if I m having the worst                  headache of my life.  ? Call my doctor or  clinic or go to urgent care or an ER if my medicine doesn t work.  ? Let my doctor or clinic know within 2 weeks if I have gone to an urgent care or             emergency department.          Provider specific instructions:  none

## 2019-04-02 NOTE — PATIENT INSTRUCTIONS
Tinnitus may have been from wax impaction.  Now resolved with ear lavage by my MA.  If no improvement, could consider ENT consult.   Ok to take anti-anxiety meds for flight only - pt well aware of risks and appropriate use.   Migraines stable with use of imitrex. Continue without changes.

## 2019-04-02 NOTE — TELEPHONE ENCOUNTER
Annual 1/30/19. Please see my chart message below. Routing to Dr. Duncan.     1/30/19 Will review information on endometrial ablation; if interested, will return for pelvic ultrasound and endometrial biopsy

## 2019-04-10 ENCOUNTER — ANCILLARY PROCEDURE (OUTPATIENT)
Dept: ULTRASOUND IMAGING | Facility: CLINIC | Age: 44
End: 2019-04-10
Payer: COMMERCIAL

## 2019-04-10 ENCOUNTER — OFFICE VISIT (OUTPATIENT)
Dept: OBGYN | Facility: CLINIC | Age: 44
End: 2019-04-10
Payer: COMMERCIAL

## 2019-04-10 ENCOUNTER — TELEPHONE (OUTPATIENT)
Dept: OBGYN | Facility: CLINIC | Age: 44
End: 2019-04-10

## 2019-04-10 VITALS — WEIGHT: 215 LBS | BODY MASS INDEX: 35.78 KG/M2 | DIASTOLIC BLOOD PRESSURE: 86 MMHG | SYSTOLIC BLOOD PRESSURE: 122 MMHG

## 2019-04-10 DIAGNOSIS — N92.0 MENORRHAGIA WITH REGULAR CYCLE: Primary | ICD-10-CM

## 2019-04-10 DIAGNOSIS — N93.9 ABNORMAL UTERINE BLEEDING: ICD-10-CM

## 2019-04-10 PROCEDURE — 88305 TISSUE EXAM BY PATHOLOGIST: CPT | Performed by: OBSTETRICS & GYNECOLOGY

## 2019-04-10 PROCEDURE — 58100 BIOPSY OF UTERUS LINING: CPT | Performed by: OBSTETRICS & GYNECOLOGY

## 2019-04-10 PROCEDURE — 76830 TRANSVAGINAL US NON-OB: CPT | Performed by: OBSTETRICS & GYNECOLOGY

## 2019-04-10 NOTE — PATIENT INSTRUCTIONS
Ultrasound images reviewed and normal today.  No structural causes for heavy bleeding noted.  Endometrial biopsy done today without issues.  I will call Teresa later this week with biopsy results.    Long discussion today regarding management options for heavy menses including medications (hormonal and non-hormonal) vs ablation vs hysterectomy for definitive management.  Will proceed with david endometrial ablation.

## 2019-04-10 NOTE — PROGRESS NOTES
INDICATIONS:                                                    Is a pregnancy test required: No.  Was a consent obtained?  Yes    Having endometrial biopsy for menorrhagia     Teresa is here today for pelvic ultrasound and endometrial biopsy in workup for treatment of heavy menses.  Has been having heavy menses every 21d for the last few years.  +heavy bleeding x 3-4d and then tapers to spotting.  Needing super ultra tampons and changing every 2-3hrs during heavy days.  Getting up 1-2x/night to change as well to avoid accidents.  +cramping.  Everything has gotten progressively worse in the last few years.  Used depo provera for several years and then tried mirena IUD which was removed due to partial expulsion after 2yrs.  Tried OCPs for a while but didn't like taking a daily pill.  Partner has vasectomy for contracetpion.    US today normal; normal cavity with no evidence of polyps or fibroids.  Normal ovaries.    PROCEDURE;                                                      A speculum was placed in the vagina and cervix prepped with betadine. A tenaculum was not attached to the cervix. A small plastic 5 mm Pipelle syringe curette was inserted into the cervical canal. The uterus was sounded to 7 cm's. A vigorous four quadrant biopsy was performed, removing amount moderate  of tissue. The speculum was removed. This tissue was placed in Formalin and sent to pathology.    The patient tolerated the procedure  fairly well and she reported there was  cramping.      POST PROCEDURE;                                                      There  was mild cramping at the time of discharge. She  tolerated the procedure well with minimal discomfort. There were no complications. Patient was discharged in stable condition.    Patient advised to call the clinic if severe pelvic pain, fever or heavy bleeding.    Patient Instructions   Ultrasound images reviewed and normal today.  No structural causes for heavy bleeding noted.   Endometrial biopsy done today without issues.  I will call Teresa later this week with biopsy results.    Long discussion t      Daniella Duncan MD

## 2019-04-10 NOTE — TELEPHONE ENCOUNTER
Type of surgery: HSC DAVID ABLATION  Location of surgery: [facility]  Date and time of surgery: 5/2/2019 11:35a ARRIVAL 9:35a  Surgeon: Dakota  Pre-Op Appt Date: PRIMARY  Post-Op Appt Date: TBD   Packet sent out: HANDED 4/10/2019  Pre-cert/Authorization completed:  TBD  Date: 4/10/2019 Arnaldo w/[facility]  Surgery Scheduler      Order Questions     Question Answer Comment   Procedure name(s) - multi select diagnostic hysteroscopy and david endometrial ablation    Reason for procedure menorrhagia    Is this a multi surgeon case? No    Laterality N/A    Request for additional equipment Other (see comments) None   Anesthesia General    Initiate Pre-op orders for above procedure: Yes, as ordered in Epic Additional orders noted there also   Location of Case: [facility] OR    Surgeon Procedure Time (incision to closure) in minutes (per procedure as applicable) 30    Note:  Surgical Case Time Needed (in minutes)   Patient Class (for admit prior to surgery, specify number of days in comments): Same day (hospital outpatient)    Why can t this outpatient surgery be done at the [facility] or  ASC? MD harris    Post-Op Appointment 4 weeks    Vendor Needed? Yes david

## 2019-04-12 LAB — COPATH REPORT: NORMAL

## 2019-04-22 NOTE — PROGRESS NOTES
[facility]  [address]  [facility] - [place], [place] [address] [phone]  Dept: [phone]    PRE-OP EVALUATION:  Today's date: 2019    Teresa Brian (: 1975) presents for pre-operative evaluation assessment as requested by Dr. Daniella Duncan.  She requires evaluation and anesthesia risk assessment prior to undergoing surgery/procedure for treatment of Menorrhagia.    Proposed Surgery/ Procedure: Hysteroscopy and Ablation  Date of Surgery/ Procedure: 2019  Time of Surgery/ Procedure: 1145 am  Hospital/Surgical Facility: [facility]  Fax number for surgical facility:   Primary Physician: Anastasia Norton  Type of Anesthesia Anticipated: General    Patient has a Health Care Directive or Living Will:  NO    1. NO - Do you have a history of heart attack, stroke, stent, bypass or surgery on an artery in the head, neck, heart or legs?  2. NO - Do you ever have any pain or discomfort in your chest?  3. NO - Do you have a history of  Heart Failure?  4. NO - Are you troubled by shortness of breath when: walking on the level, up a slight hill or at night?  5. NO - Do you currently have a cold, bronchitis or other respiratory infection?  6. NO - Do you have a cough, shortness of breath or wheezing?  7. NO - Do you sometimes get pains in the calves of your legs when you walk?  8. NO - Do you or anyone in your family have previous history of blood clots?  9. NO - Do you or does anyone in your family have a serious bleeding problem such as prolonged bleeding following surgeries or cuts?  10. NO - Have you ever had problems with anemia or been told to take iron pills?  11. NO - Have you had any abnormal blood loss such as black, tarry or bloody stools, or abnormal vaginal bleeding?  12. NO - Have you ever had a blood transfusion?  13. YES - Have you or any of your relatives ever had problems with anesthesia? Always get's nausea and vomiting.  14. NO - Do you have sleep  apnea, excessive snoring or daytime drowsiness?  15. NO - Do you have any prosthetic heart valves?  16. NO - Do you have prosthetic joints?  17. NO - Is there any chance that you may be pregnant?    Ni Ruvalcaba MA        HPI:     HPI related to upcoming procedure: Teresa is a 42 yo female here today for pre-op. She has been suffering with menorrhagia for the past 5 yrs, but has worsened over the past couple of years. Thus, sought care with OB/GYN and did undergo endometrial biopsy which was normal. They are now pursuing hysteroscopy and ablation.       Is confident she is not pregnant - currently on menses. LMP 4/24. Significant other has had a vasectomy.      See problem list for active medical problems.  Problems all longstanding and stable, except as noted/documented.  See ROS for pertinent symptoms related to these conditions.                                                                                                                                                              MEDICAL HISTORY:     Patient Active Problem List    Diagnosis Date Noted     Epigastric pain- chronic intermittent since 4/2017. Extensive normal work-up: hepatic/celiac panels, lipase, TSH, CBC, CRP/ESR, EGD, colonoscopy, HIDA scan, CT abdomen.Per GI may be functional origin  04/29/2019     Priority: Medium     Pt offered trial of nortriptyline by GI, but she declines.  Was an element of constipation - used miralax for 1 month.        Family history of Parkinson disease - Father early onset around age 30-40's 11/28/2018     Priority: Medium     Non morbid obesity due to excess calories 09/07/2017     Priority: Medium     Migraine with aura and without status migrainosus, not intractable 04/27/2017     Priority: Medium     History of cervical dysplasia      Priority: Medium     (2009) JOANNA I  ([facility])  (2010) LGSIL, ASC-US, +HRHPV;  colpo -->JOANNA I and focal JOANNA II  (2011) LGSIL;  LEEP-->sq metaplasia  (2012) neg pap,  +HRHPV  (2013) neg pap, neg HPV  (2016) neg pap, neg HPV;  Resume routine screening       Menorrhagia 10/06/2015     Priority: Medium     S/P ACL reconstruction 11/19/2010     Priority: Medium     CARDIOVASCULAR SCREENING; LDL GOAL LESS THAN 160 10/31/2010     Priority: Medium      Past Medical History:   Diagnosis Date     Asthma      BMI 34.0-34.9,adult 10/6/2015     CARDIOVASCULAR SCREENING; LDL GOAL LESS THAN 160      Cervical dysplasia 2010 2009-colpo @ [facility]-->JOANNA 1; persistent LGSIL/ASC-US paps; colposcopy 12/2010 with JOANNA 1, focal JOANNA 2 and benign ECC     Condyloma acuminatum 4/2009    perirecal BX      Herpes simplex virus infection     HSV-1     Menarche age 15    cycles q 3-4 x 3-4 d w cramps     Menorrhagia      Migraine      Personal history of cervical dysplasia 2010    (2009) JOANNA I; (2010) JOANNA I and focal JOANNA II     Past Surgical History:   Procedure Laterality Date     ARTHROSCOPIC RECONSTRUCTION ANTERIOR CRUCIATE LIGAMENT Right 10/2010    Reconstruction (ACL, MCL) and fibular fx     CATARACT IOL, RT/LT      5/2017, 9/2017     COLONOSCOPY       COLONOSCOPY N/A 6/27/2017    Procedure: COLONOSCOPY;;  Surgeon: Allan Olivier MD;  Location:  GI     COLPOSCOPY CERVIX, LOOP ELECTRODE BIOPSY, COMBINED  08/2011    squamous metaplasia     ESOPHAGOSCOPY, GASTROSCOPY, DUODENOSCOPY (EGD), COMBINED       ESOPHAGOSCOPY, GASTROSCOPY, DUODENOSCOPY (EGD), COMBINED N/A 6/27/2017    Procedure: COMBINED ESOPHAGOSCOPY, GASTROSCOPY, DUODENOSCOPY (EGD), BIOPSY SINGLE OR MULTIPLE;  ESOPHAGOSCOPY, GASTROSCOPY, DUODENOSCOPY (EGD with biopsies by cold forcep, Colonoscopy with biopsies by cold forcep.;  Surgeon: Allan Olivier MD;  Location:  GI     HC TOOTH EXTRACTION W/FORCEP  2000    wisdom teeth extraction     LAPAROSCOPY DIAGNOSTIC (GYN)  1992    negative ([facility])     Current Outpatient Medications   Medication Sig Dispense Refill     ALPRAZolam (XANAX) 0.25 MG tablet Take 30 minutes prior to  daytime flight. 4 tablet 0     LORazepam (ATIVAN) 0.5 MG tablet Take 0.5-1 tablets (0.25-0.5 mg) by mouth every 8 hours as needed for anxiety Take 30 minutes prior to departure.  Do not operate a vehicle after taking this medication 4 tablet 0     SUMAtriptan (IMITREX) 25 MG tablet Take 1-2 tablets (25-50 mg) by mouth at onset of headache for migraine May repeat in 2 hours. Max 8 tablets/24 hours. 9 tablet 1     OTC products: none excluding a B vitamin    Allergies   Allergen Reactions     Vicodin [Hydrocodone-Acetaminophen] Nausea and Vomiting      Latex Allergy: NO    Social History     Tobacco Use     Smoking status: Never Smoker     Smokeless tobacco: Never Used   Substance Use Topics     Alcohol use: Yes     Alcohol/week: 1.2 oz     Types: 2 Standard drinks or equivalent per week     Comment: 2 drinks per week avg     History   Drug Use No       REVIEW OF SYSTEMS:   CONSTITUTIONAL: NEGATIVE for fever, chills, change in weight  INTEGUMENTARY/SKIN: NEGATIVE for worrisome rashes, moles or lesions  EYES: NEGATIVE for vision changes or irritation  ENT/MOUTH: NEGATIVE for ear, mouth and throat problems  RESP: NEGATIVE for significant cough or SOB  CV: NEGATIVE for chest pain, palpitations or peripheral edema  GI: + for chronic recurrent epigastric pain - extensive previous work up for same updated in Epic today. Per GI thought to be functional in origin, but she declined trial of nortriptyline. This is stable and she declines any further intervention at this time. NEGATIVE for nausea, heartburn, or change in bowel habits  : + for menorrhagia. NEGATIVE for frequency, dysuria, or hematuria  MUSCULOSKELETAL: NEGATIVE for significant arthralgias or myalgia  NEURO: + for pt mentions that she had 3 migraines in a row last week on 2nd-4th days of her menses. Did respond to her imitrex, but she usually doesn't get them for that many days in a row. They have since resolved, but wanted to mention while she was here today.  "She will continue to keep a diary of headaches and follow-up if increasing or ongoing concerns. NEGATIVE for weakness, dizziness or paresthesias  ENDOCRINE: NEGATIVE for temperature intolerance, skin/hair changes  HEME: NEGATIVE for bleeding problems  PSYCHIATRIC: NEGATIVE for changes in mood or affect    EXAM:   /80 (BP Location: Right arm, Cuff Size: Adult Large)   Pulse 78   Temp 98.1  F (36.7  C) (Oral)   Ht 1.651 m (5' 5\")   Wt 99.8 kg (220 lb)   LMP 04/01/2019   SpO2 99%   BMI 36.61 kg/m      GENERAL APPEARANCE: healthy, alert and no distress     EYES: EOMI, PERRL     HENT: ear canals and TM's normal and nose and mouth without ulcers or lesions     NECK: no adenopathy, no asymmetry, masses, or scars and thyroid normal to palpation     RESP: lungs clear to auscultation - no rales, rhonchi or wheezes     CV: regular rates and rhythm, normal S1 S2, no S3 or S4 and no murmur, click or rub     ABDOMEN:  soft, nontender, no HSM or masses and bowel sounds normal     MS: extremities normal- no gross deformities noted, no evidence of inflammation in joints, FROM in all extremities.     SKIN: no suspicious lesions or rashes     NEURO: Normal strength and tone, sensory exam grossly normal, mentation intact and speech normal     PSYCH: mentation appears normal. and affect normal/bright     LYMPHATICS: No cervical adenopathy    DIAGNOSTICS:   EKG: Not indicated due to non-vascular surgery and low risk of event (age <65 and without cardiac risk factors)    Recent Labs   Lab Test 11/28/18  0844 10/24/17  1604  04/07/14  2245   HGB 13.7 14.6   < > 13.5    222   < > 227     --   --  139   POTASSIUM 4.0  --   --  3.7   CR 0.78  --   --  0.73    < > = values in this interval not displayed.      Results for orders placed or performed in visit on 04/29/19   CBC with platelets   Result Value Ref Range    WBC 8.3 4.0 - 11.0 10e9/L    RBC Count 4.41 3.8 - 5.2 10e12/L    Hemoglobin 13.3 11.7 - 15.7 g/dL    " Hematocrit 39.9 35.0 - 47.0 %    MCV 91 78 - 100 fl    MCH 30.2 26.5 - 33.0 pg    MCHC 33.3 31.5 - 36.5 g/dL    RDW 12.6 10.0 - 15.0 %    Platelet Count 228 150 - 450 10e9/L     IMPRESSION:   Reason for surgery/procedure: menorrhagia/hysteroscopy with ablation  Diagnosis/reason for consult: pre-op, obesity, migraine     The proposed surgical procedure is considered INTERMEDIATE risk.    REVISED CARDIAC RISK INDEX  The patient has the following serious cardiovascular risks for perioperative complications such as (MI, PE, VFib and 3  AV Block):  No serious cardiac risks  INTERPRETATION: 0 risks: Class I (very low risk - 0.4% complication rate)    The patient has the following additional risks for perioperative complications:  No identified additional risks      ICD-10-CM    1. Preop general physical exam Z01.818 CBC with platelets   2. Menorrhagia with regular cycle N92.0 CBC with platelets   3. Non morbid obesity due to excess calories E66.09    4. Migraine with aura and without status migrainosus, not intractable G43.109    5. Epigastric pain- chronic intermittent since 4/2017. Extensive normal work-up: hepatic/celiac panels, lipase, TSH, CBC, CRP/ESR, EGD, colonoscopy, HIDA scan, CT abdomen.Per GI may be functional origin  R10.13     Pt offered trial of nortriptyline by GI, but she declines.  Was an element of constipation - used miralax for 1 month.        RECOMMENDATIONS:       APPROVAL GIVEN to proceed with proposed procedure, without further diagnostic evaluation.       Signed Electronically by: Anastasia Norton PA-C    Copy of this evaluation report is provided to requesting physician.    [facility] Preop Guidelines    Revised Cardiac Risk Index

## 2019-04-25 NOTE — TELEPHONE ENCOUNTER
Due to 'equipment issues' the case time has changed for this patient.     SX 7:30am ARRIVAL 5:30am     LVM for pt requesting CB to confirm    Chloe Leach  Surgery Scheduler

## 2019-04-29 ENCOUNTER — OFFICE VISIT (OUTPATIENT)
Dept: FAMILY MEDICINE | Facility: CLINIC | Age: 44
End: 2019-04-29
Payer: COMMERCIAL

## 2019-04-29 VITALS
WEIGHT: 220 LBS | HEART RATE: 78 BPM | DIASTOLIC BLOOD PRESSURE: 80 MMHG | BODY MASS INDEX: 36.65 KG/M2 | OXYGEN SATURATION: 99 % | SYSTOLIC BLOOD PRESSURE: 124 MMHG | TEMPERATURE: 98.1 F | HEIGHT: 65 IN

## 2019-04-29 DIAGNOSIS — N92.0 MENORRHAGIA WITH REGULAR CYCLE: ICD-10-CM

## 2019-04-29 DIAGNOSIS — G43.109 MIGRAINE WITH AURA AND WITHOUT STATUS MIGRAINOSUS, NOT INTRACTABLE: ICD-10-CM

## 2019-04-29 DIAGNOSIS — R10.13 EPIGASTRIC PAIN: ICD-10-CM

## 2019-04-29 DIAGNOSIS — E66.09 NON MORBID OBESITY DUE TO EXCESS CALORIES: ICD-10-CM

## 2019-04-29 DIAGNOSIS — Z01.818 PREOP GENERAL PHYSICAL EXAM: Primary | ICD-10-CM

## 2019-04-29 LAB
ERYTHROCYTE [DISTWIDTH] IN BLOOD BY AUTOMATED COUNT: 12.6 % (ref 10–15)
HCT VFR BLD AUTO: 39.9 % (ref 35–47)
HGB BLD-MCNC: 13.3 G/DL (ref 11.7–15.7)
MCH RBC QN AUTO: 30.2 PG (ref 26.5–33)
MCHC RBC AUTO-ENTMCNC: 33.3 G/DL (ref 31.5–36.5)
MCV RBC AUTO: 91 FL (ref 78–100)
PLATELET # BLD AUTO: 228 10E9/L (ref 150–450)
RBC # BLD AUTO: 4.41 10E12/L (ref 3.8–5.2)
WBC # BLD AUTO: 8.3 10E9/L (ref 4–11)

## 2019-04-29 PROCEDURE — 85027 COMPLETE CBC AUTOMATED: CPT | Performed by: PHYSICIAN ASSISTANT

## 2019-04-29 PROCEDURE — 36415 COLL VENOUS BLD VENIPUNCTURE: CPT | Performed by: PHYSICIAN ASSISTANT

## 2019-04-29 PROCEDURE — 99214 OFFICE O/P EST MOD 30 MIN: CPT | Performed by: PHYSICIAN ASSISTANT

## 2019-04-29 ASSESSMENT — MIFFLIN-ST. JEOR: SCORE: 1653.79

## 2019-04-29 NOTE — RESULT ENCOUNTER NOTE
Dear Nichole,      Your recent test results are noted below:    -Normal red blood cell (hgb) levels, normal white blood cell count and normal platelet levels. You can proceed with surgery as planned :)    For additional lab test information, [email or web address] is an excellent reference. Please contact the clinic at [phone] with any further questions or concerns.    Sincerely,      Anastasia Norton PA-C  [facility]

## 2019-04-29 NOTE — LETTER
My Migraine Action Plan      Date: 4/29/2019     My Name: Teresa Brian   YOB: 1975  My Pharmacy:    [id number] - [place], [place] - [address]  [facility] [place] - [address]  [id number] - [place], [place] - [address]       My (Preventative) Control Medicine: none        My Rescue Medicine: imitrex   My Doctor: Anastasia Norton     My Clinic: [facility] [place]  [address] [address] [phone]        GREEN ZONE = Good Control    My headache plan is working.   I can do what I need to do.           I WILL:     ? Keep managing my triggers.  ? Write in my migraine diary each time I have a headache.  ? Keep taking my preventive (controller) medicine daily.  ? Take my relief and rescue medicine as needed.             YELLOW ZONE = Not Enough Control    My headache plan isn t always working.   My headaches keep me from doing   some of the things I need to do.       I WILL:     ? Set goals to control my triggers and act on them.  ? Write in my migraine diary each time I have a headache and review it for                      patterns or new triggers.  ? Keep taking my preventive (controller) medicine daily.  ? Take my relief and rescue medicine as needed.  ? Call my doctor or clinic at if I stay in the Yellow Zone.             RED ZONE = Poor or No Control    My headache plan has  failed. I can t do anything  when I have one. My  medicines aren t working.           I WILL:   ? Set goals to control my triggers and act on them.  ? Write in my migraine diary each time I have a headache and review it for                      patterns or new triggers.  ? Keep taking my preventive (controller) medicine daily.  ? Take my relief and rescue medicine as needed.  ? Call my doctor or clinic or go to urgent care or an ER if I m having the worst                  headache of my life.  ? Call my doctor or  clinic or go to urgent care or an ER if my medicine doesn t work.  ? Let my doctor or clinic know within 2 weeks if I have gone to an urgent care or             emergency department.          Provider specific instructions:  none

## 2019-05-02 ENCOUNTER — ANESTHESIA (OUTPATIENT)
Dept: SURGERY | Facility: CLINIC | Age: 44
End: 2019-05-02
Payer: COMMERCIAL

## 2019-05-02 ENCOUNTER — ANESTHESIA EVENT (OUTPATIENT)
Dept: SURGERY | Facility: CLINIC | Age: 44
End: 2019-05-02
Payer: COMMERCIAL

## 2019-05-02 ENCOUNTER — HOSPITAL ENCOUNTER (OUTPATIENT)
Facility: CLINIC | Age: 44
Discharge: HOME | End: 2019-05-02
Attending: OBSTETRICS & GYNECOLOGY | Admitting: OBSTETRICS & GYNECOLOGY
Payer: COMMERCIAL

## 2019-05-02 VITALS
DIASTOLIC BLOOD PRESSURE: 86 MMHG | HEIGHT: 65 IN | BODY MASS INDEX: 36.54 KG/M2 | OXYGEN SATURATION: 98 % | RESPIRATION RATE: 12 BRPM | HEART RATE: 60 BPM | SYSTOLIC BLOOD PRESSURE: 135 MMHG | WEIGHT: 219.3 LBS | TEMPERATURE: 97.8 F

## 2019-05-02 DIAGNOSIS — N92.0 MENORRHAGIA WITH REGULAR CYCLE: Primary | ICD-10-CM

## 2019-05-02 LAB — B-HCG SERPL-ACNC: <1 IU/L (ref 0–5)

## 2019-05-02 PROCEDURE — 37000009 ZZH ANESTHESIA TECHNICAL FEE, EACH ADDTL 15 MIN: Performed by: OBSTETRICS & GYNECOLOGY

## 2019-05-02 PROCEDURE — 25800030 ZZH RX IP 258 OP 636: Performed by: NURSE ANESTHETIST, CERTIFIED REGISTERED

## 2019-05-02 PROCEDURE — 58563 HYSTEROSCOPY ABLATION: CPT | Performed by: OBSTETRICS & GYNECOLOGY

## 2019-05-02 PROCEDURE — 25000125 ZZHC RX 250: Performed by: NURSE ANESTHETIST, CERTIFIED REGISTERED

## 2019-05-02 PROCEDURE — 71000013 ZZH RECOVERY PHASE 1 LEVEL 1 EA ADDTL HR: Performed by: OBSTETRICS & GYNECOLOGY

## 2019-05-02 PROCEDURE — 25000128 H RX IP 250 OP 636: Performed by: ANESTHESIOLOGY

## 2019-05-02 PROCEDURE — 36000058 ZZH SURGERY LEVEL 3 EA 15 ADDTL MIN: Performed by: OBSTETRICS & GYNECOLOGY

## 2019-05-02 PROCEDURE — 88305 TISSUE EXAM BY PATHOLOGIST: CPT | Performed by: OBSTETRICS & GYNECOLOGY

## 2019-05-02 PROCEDURE — 40000169 ZZH STATISTIC PRE-PROCEDURE ASSESSMENT I: Performed by: OBSTETRICS & GYNECOLOGY

## 2019-05-02 PROCEDURE — 25000128 H RX IP 250 OP 636: Performed by: NURSE ANESTHETIST, CERTIFIED REGISTERED

## 2019-05-02 PROCEDURE — 25800025 ZZH RX 258: Performed by: OBSTETRICS & GYNECOLOGY

## 2019-05-02 PROCEDURE — 25000125 ZZHC RX 250: Performed by: ANESTHESIOLOGY

## 2019-05-02 PROCEDURE — 71000012 ZZH RECOVERY PHASE 1 LEVEL 1 FIRST HR: Performed by: OBSTETRICS & GYNECOLOGY

## 2019-05-02 PROCEDURE — 36415 COLL VENOUS BLD VENIPUNCTURE: CPT | Performed by: OBSTETRICS & GYNECOLOGY

## 2019-05-02 PROCEDURE — 25000132 ZZH RX MED GY IP 250 OP 250 PS 637: Performed by: OBSTETRICS & GYNECOLOGY

## 2019-05-02 PROCEDURE — C1888 ENDOVAS NON-CARDIAC ABL CATH: HCPCS | Performed by: OBSTETRICS & GYNECOLOGY

## 2019-05-02 PROCEDURE — 84702 CHORIONIC GONADOTROPIN TEST: CPT | Performed by: OBSTETRICS & GYNECOLOGY

## 2019-05-02 PROCEDURE — 71000027 ZZH RECOVERY PHASE 2 EACH 15 MINS: Performed by: OBSTETRICS & GYNECOLOGY

## 2019-05-02 PROCEDURE — 36000056 ZZH SURGERY LEVEL 3 1ST 30 MIN: Performed by: OBSTETRICS & GYNECOLOGY

## 2019-05-02 PROCEDURE — 37000008 ZZH ANESTHESIA TECHNICAL FEE, 1ST 30 MIN: Performed by: OBSTETRICS & GYNECOLOGY

## 2019-05-02 PROCEDURE — 27210794 ZZH OR GENERAL SUPPLY STERILE: Performed by: OBSTETRICS & GYNECOLOGY

## 2019-05-02 RX ORDER — SODIUM CHLORIDE, SODIUM LACTATE, POTASSIUM CHLORIDE, CALCIUM CHLORIDE 600; 310; 30; 20 MG/100ML; MG/100ML; MG/100ML; MG/100ML
INJECTION, SOLUTION INTRAVENOUS CONTINUOUS
Status: DISCONTINUED | OUTPATIENT
Start: 2019-05-02 | End: 2019-05-02 | Stop reason: HOSPADM

## 2019-05-02 RX ORDER — ACETAMINOPHEN 325 MG/1
325-650 TABLET ORAL EVERY 6 HOURS PRN
COMMUNITY
End: 2019-12-09

## 2019-05-02 RX ORDER — ACETAMINOPHEN 325 MG/1
975 TABLET ORAL ONCE
Status: COMPLETED | OUTPATIENT
Start: 2019-05-02 | End: 2019-05-02

## 2019-05-02 RX ORDER — LIDOCAINE HYDROCHLORIDE 20 MG/ML
INJECTION, SOLUTION INFILTRATION; PERINEURAL PRN
Status: DISCONTINUED | OUTPATIENT
Start: 2019-05-02 | End: 2019-05-02

## 2019-05-02 RX ORDER — PROMETHAZINE HYDROCHLORIDE 25 MG/ML
12.5-25 INJECTION, SOLUTION INTRAMUSCULAR; INTRAVENOUS
Status: COMPLETED | OUTPATIENT
Start: 2019-05-02 | End: 2019-05-02

## 2019-05-02 RX ORDER — OXYCODONE HYDROCHLORIDE 5 MG/1
5 TABLET ORAL
Status: DISCONTINUED | OUTPATIENT
Start: 2019-05-02 | End: 2019-05-02 | Stop reason: HOSPADM

## 2019-05-02 RX ORDER — PROPOFOL 10 MG/ML
INJECTION, EMULSION INTRAVENOUS PRN
Status: DISCONTINUED | OUTPATIENT
Start: 2019-05-02 | End: 2019-05-02

## 2019-05-02 RX ORDER — ONDANSETRON 4 MG/1
4 TABLET, ORALLY DISINTEGRATING ORAL EVERY 30 MIN PRN
Status: DISCONTINUED | OUTPATIENT
Start: 2019-05-02 | End: 2019-05-02 | Stop reason: HOSPADM

## 2019-05-02 RX ORDER — PROPOFOL 10 MG/ML
INJECTION, EMULSION INTRAVENOUS CONTINUOUS PRN
Status: DISCONTINUED | OUTPATIENT
Start: 2019-05-02 | End: 2019-05-02

## 2019-05-02 RX ORDER — ONDANSETRON 2 MG/ML
INJECTION INTRAMUSCULAR; INTRAVENOUS PRN
Status: DISCONTINUED | OUTPATIENT
Start: 2019-05-02 | End: 2019-05-02

## 2019-05-02 RX ORDER — MULTIPLE VITAMINS W/ MINERALS TAB 9MG-400MCG
1 TAB ORAL DAILY
COMMUNITY
End: 2022-04-18

## 2019-05-02 RX ORDER — ACETAMINOPHEN 325 MG/1
650 TABLET ORAL EVERY 4 HOURS PRN
Qty: 50 TABLET | Refills: 0 | COMMUNITY
Start: 2019-05-02 | End: 2021-02-15

## 2019-05-02 RX ORDER — FENTANYL CITRATE 50 UG/ML
INJECTION, SOLUTION INTRAMUSCULAR; INTRAVENOUS PRN
Status: DISCONTINUED | OUTPATIENT
Start: 2019-05-02 | End: 2019-05-02

## 2019-05-02 RX ORDER — ONDANSETRON 2 MG/ML
4 INJECTION INTRAMUSCULAR; INTRAVENOUS EVERY 30 MIN PRN
Status: DISCONTINUED | OUTPATIENT
Start: 2019-05-02 | End: 2019-05-02 | Stop reason: HOSPADM

## 2019-05-02 RX ORDER — ACETAMINOPHEN 325 MG/1
650 TABLET ORAL
Status: DISCONTINUED | OUTPATIENT
Start: 2019-05-02 | End: 2019-05-02 | Stop reason: HOSPADM

## 2019-05-02 RX ORDER — DEXAMETHASONE SODIUM PHOSPHATE 4 MG/ML
INJECTION, SOLUTION INTRA-ARTICULAR; INTRALESIONAL; INTRAMUSCULAR; INTRAVENOUS; SOFT TISSUE PRN
Status: DISCONTINUED | OUTPATIENT
Start: 2019-05-02 | End: 2019-05-02

## 2019-05-02 RX ORDER — HYDROMORPHONE HYDROCHLORIDE 1 MG/ML
.3-.5 INJECTION, SOLUTION INTRAMUSCULAR; INTRAVENOUS; SUBCUTANEOUS EVERY 10 MIN PRN
Status: DISCONTINUED | OUTPATIENT
Start: 2019-05-02 | End: 2019-05-02 | Stop reason: HOSPADM

## 2019-05-02 RX ORDER — IBUPROFEN 600 MG/1
600 TABLET, FILM COATED ORAL EVERY 6 HOURS PRN
Qty: 30 TABLET | Refills: 0 | COMMUNITY
Start: 2019-05-02 | End: 2021-02-15

## 2019-05-02 RX ORDER — SODIUM CHLORIDE, SODIUM LACTATE, POTASSIUM CHLORIDE, CALCIUM CHLORIDE 600; 310; 30; 20 MG/100ML; MG/100ML; MG/100ML; MG/100ML
INJECTION, SOLUTION INTRAVENOUS CONTINUOUS PRN
Status: DISCONTINUED | OUTPATIENT
Start: 2019-05-02 | End: 2019-05-02

## 2019-05-02 RX ORDER — OMEGA-3 FATTY ACIDS/FISH OIL 300-1000MG
600 CAPSULE ORAL EVERY 4 HOURS PRN
COMMUNITY
End: 2019-12-09

## 2019-05-02 RX ORDER — KETOROLAC TROMETHAMINE 30 MG/ML
INJECTION, SOLUTION INTRAMUSCULAR; INTRAVENOUS PRN
Status: DISCONTINUED | OUTPATIENT
Start: 2019-05-02 | End: 2019-05-02

## 2019-05-02 RX ORDER — SCOLOPAMINE TRANSDERMAL SYSTEM 1 MG/1
1 PATCH, EXTENDED RELEASE TRANSDERMAL
Status: DISCONTINUED | OUTPATIENT
Start: 2019-05-02 | End: 2019-05-02 | Stop reason: HOSPADM

## 2019-05-02 RX ORDER — FENTANYL CITRATE 50 UG/ML
25-50 INJECTION, SOLUTION INTRAMUSCULAR; INTRAVENOUS EVERY 5 MIN PRN
Status: DISCONTINUED | OUTPATIENT
Start: 2019-05-02 | End: 2019-05-02 | Stop reason: HOSPADM

## 2019-05-02 RX ORDER — NALOXONE HYDROCHLORIDE 0.4 MG/ML
.1-.4 INJECTION, SOLUTION INTRAMUSCULAR; INTRAVENOUS; SUBCUTANEOUS
Status: DISCONTINUED | OUTPATIENT
Start: 2019-05-02 | End: 2019-05-02 | Stop reason: HOSPADM

## 2019-05-02 RX ADMIN — ACETAMINOPHEN 975 MG: 325 TABLET, FILM COATED ORAL at 06:51

## 2019-05-02 RX ADMIN — DEXAMETHASONE SODIUM PHOSPHATE 4 MG: 4 INJECTION, SOLUTION INTRA-ARTICULAR; INTRALESIONAL; INTRAMUSCULAR; INTRAVENOUS; SOFT TISSUE at 07:45

## 2019-05-02 RX ADMIN — Medication 0.5 MG: at 08:30

## 2019-05-02 RX ADMIN — SCOPALAMINE 1 PATCH: 1 PATCH, EXTENDED RELEASE TRANSDERMAL at 07:04

## 2019-05-02 RX ADMIN — SODIUM CHLORIDE, POTASSIUM CHLORIDE, SODIUM LACTATE AND CALCIUM CHLORIDE: 600; 310; 30; 20 INJECTION, SOLUTION INTRAVENOUS at 07:34

## 2019-05-02 RX ADMIN — ONDANSETRON 4 MG: 2 INJECTION INTRAMUSCULAR; INTRAVENOUS at 10:06

## 2019-05-02 RX ADMIN — MIDAZOLAM 2 MG: 1 INJECTION INTRAMUSCULAR; INTRAVENOUS at 07:34

## 2019-05-02 RX ADMIN — PROMETHAZINE HYDROCHLORIDE 12.5 MG: 25 INJECTION INTRAMUSCULAR; INTRAVENOUS at 10:44

## 2019-05-02 RX ADMIN — KETOROLAC TROMETHAMINE 30 MG: 30 INJECTION, SOLUTION INTRAMUSCULAR at 07:47

## 2019-05-02 RX ADMIN — PROPOFOL 200 MCG/KG/MIN: 10 INJECTION, EMULSION INTRAVENOUS at 07:34

## 2019-05-02 RX ADMIN — LIDOCAINE HYDROCHLORIDE 80 MG: 20 INJECTION, SOLUTION INFILTRATION; PERINEURAL at 07:34

## 2019-05-02 RX ADMIN — ONDANSETRON 4 MG: 2 INJECTION INTRAMUSCULAR; INTRAVENOUS at 07:45

## 2019-05-02 RX ADMIN — FENTANYL CITRATE 100 MCG: 50 INJECTION, SOLUTION INTRAMUSCULAR; INTRAVENOUS at 07:34

## 2019-05-02 RX ADMIN — FENTANYL CITRATE 50 MCG: 50 INJECTION, SOLUTION INTRAMUSCULAR; INTRAVENOUS at 08:23

## 2019-05-02 RX ADMIN — PROPOFOL 200 MG: 10 INJECTION, EMULSION INTRAVENOUS at 07:34

## 2019-05-02 RX ADMIN — FENTANYL CITRATE 50 MCG: 50 INJECTION, SOLUTION INTRAMUSCULAR; INTRAVENOUS at 08:17

## 2019-05-02 RX ADMIN — Medication 0.5 MG: at 09:06

## 2019-05-02 ASSESSMENT — LIFESTYLE VARIABLES: TOBACCO_USE: 0

## 2019-05-02 ASSESSMENT — COPD QUESTIONNAIRES: COPD: 0

## 2019-05-02 ASSESSMENT — MIFFLIN-ST. JEOR: SCORE: 1650.62

## 2019-05-02 NOTE — ANESTHESIA POSTPROCEDURE EVALUATION
Patient: Teresa Brian    Procedure(s):  HYSTEROSCOPY  FELICIA ABLATION    Diagnosis:MENORRHAGIA  Diagnosis Additional Information: No value filed.    Anesthesia Type:  General, LMA    Note:  Anesthesia Post Evaluation    Patient location during evaluation: Phase 2  Patient participation: Able to fully participate in evaluation  Level of consciousness: awake and alert  Pain management: adequate  Airway patency: patent  Cardiovascular status: acceptable  Respiratory status: acceptable  Hydration status: acceptable  PONV: controlled     Anesthetic complications: None          Last vitals:  Vitals:    05/02/19 0900 05/02/19 0915 05/02/19 0930   BP: 121/81 119/71 119/63   Pulse: 64 55 60   Resp: 10 13 12   Temp:   36.6  C (97.8  F)   SpO2: 100% 94% 98%         Electronically Signed By: Danial Gómez MD  May 2, 2019  10:28 AM

## 2019-05-02 NOTE — OP NOTE
Procedure Date: 05/02/2019      PREOPERATIVE DIAGNOSIS:  Menorrhagia with regular cycle.      POSTOPERATIVE DIAGNOSIS:  Menorrhagia with regular cycle.      PROCEDURES:     1.  Diagnostic hysteroscopy with dilation and curettage.   2.  Jennifer endometrial ablation.      SURGEON:  Daniella Duncan MD      COMPLICATIONS:  None.      ANESTHESIA:  General with LMA.      ESTIMATED BLOOD LOSS:  1 mL.      FINDINGS:  Normal uterine cavity with bilateral tubal ostia visualized.  Uterus sounded to 7 cm and cervical canal 2 cm.      INDICATIONS:  Teresa is a 43-year-old G1, P0, who was seen in our office for heavy and frequent periods.  Teresa has used Depo-Provera in the past, as well as attempted Mirena IUD, but opted for surgical management due to desire to be off of hormones.  Teresa's partner had a vasectomy for contraception.  Different ablations were discussed in detail, including the risks, benefits and alternatives of each.  Teresa opted for the Jennifer ablation for higher rates of amenorrhea.      DESCRIPTION OF PROCEDURE:  Teresa was taken to the operating room where general anesthesia was administered without difficulty.  She was then prepared and draped in normal sterile fashion in dorsal lithotomy position.  A timeout was performed to identify correct patient and procedure.  Open-sided speculum was placed into the vagina and anterior lip of the cervix was grasped using a single-tooth tenaculum.  The uterus was then gently sounded to 7 cm.  Cervical canal was found to be 2 cm, given this an endometrial cavity length of 5 cm.  The cervix was then serially dilated to 7 mm.  The hysteroscope was gently advanced to the uterine fundus with a normal cavity as noted above.  Gentle curettage was performed in all quadrants prior to ablation.  The Jennifer apparatus was then set to cavity length of 5 cm and was gently advanced to the uterine fundus.  The array was then deployed, and all checks were found in place, including  cavity integrity check.  The cervical balloon was then inflated and the procedure begun.  Ablation lasted for 120 seconds.  The balloon was then deflated and the array removed without difficulty.  One final look with hysteroscope was performed with nice global ablation noted.  The single-tooth tenaculum was removed, as well as speculum.  The patient was taken to recovery room in stable condition.         DANIELLA ADAMS MD             D: 2019   T: 2019   MT: DEON      Name:     ZHANNA MCNEIL   MRN:      [id number]        Account:        [id number]   :      1975           Procedure Date: 2019      Document: [id number]       cc: Daniella Adams MD

## 2019-05-02 NOTE — ANESTHESIA PREPROCEDURE EVALUATION
Anesthesia Pre-Procedure Evaluation    Patient: Teresa Brian   MRN: [id number] : 1975          Preoperative Diagnosis: MENORRHAGIA    Procedure(s):  HYSTEROSCOPY  FELICIA ABLATION    Past Medical History:   Diagnosis Date     Asthma      BMI 34.0-34.9,adult 10/6/2015     CARDIOVASCULAR SCREENING; LDL GOAL LESS THAN 160      Cervical dysplasia 2010-colpo @ [facility]-->JOANNA 1; persistent LGSIL/ASC-US paps; colposcopy 2010 with JOANNA 1, focal JOANNA 2 and benign ECC     Condyloma acuminatum 2009    perirecal BX      Herpes simplex virus infection     HSV-1     Menarche age 15    cycles q 3-4 x 3-4 d w cramps     Menorrhagia      Migraine      Personal history of cervical dysplasia     () JOANNA I; () JOANNA I and focal JOANNA II     PONV (postoperative nausea and vomiting)      Past Surgical History:   Procedure Laterality Date     ARTHROSCOPIC RECONSTRUCTION ANTERIOR CRUCIATE LIGAMENT Right 10/2010    Reconstruction (ACL, MCL) and fibular fx     CATARACT IOL, RT/LT      2017, 2017     COLONOSCOPY       COLONOSCOPY N/A 2017    Procedure: COLONOSCOPY;;  Surgeon: Allan Olivier MD;  Location:  GI     COLPOSCOPY CERVIX, LOOP ELECTRODE BIOPSY, COMBINED  2011    squamous metaplasia     ESOPHAGOSCOPY, GASTROSCOPY, DUODENOSCOPY (EGD), COMBINED       ESOPHAGOSCOPY, GASTROSCOPY, DUODENOSCOPY (EGD), COMBINED N/A 2017    Procedure: COMBINED ESOPHAGOSCOPY, GASTROSCOPY, DUODENOSCOPY (EGD), BIOPSY SINGLE OR MULTIPLE;  ESOPHAGOSCOPY, GASTROSCOPY, DUODENOSCOPY (EGD with biopsies by cold forcep, Colonoscopy with biopsies by cold forcep.;  Surgeon: Allan Olivier MD;  Location:  GI     HC TOOTH EXTRACTION W/FORCEP      wisdom teeth extraction     LAPAROSCOPY DIAGNOSTIC (GYN)      negative ([facility])       Anesthesia Evaluation     . Pt has had prior anesthetic. Type: General    History of anesthetic complications   - PONV        ROS/MED HX    ENT/Pulmonary:     (+)asthma , .  .   (-) tobacco use and COPD   Neurologic:      (-) CVA, TIA and Neuropathy   Cardiovascular:        (-) hypertension, CAD, irregular heartbeat/palpitations and stent   METS/Exercise Tolerance:     Hematologic:        (-) anemia   Musculoskeletal:         GI/Hepatic:        (-) GERD and liver disease   Renal/Genitourinary:      (-) renal disease   Endo:     (+) Obesity, .   (-) Type I DM, Type II DM and thyroid disease   Psychiatric:         Infectious Disease:  - neg infectious disease ROS       Malignancy:         Other:                          Physical Exam  Normal systems: cardiovascular, pulmonary and dental    Airway   Mallampati: II  TM distance: >3 FB  Neck ROM: full    Dental     Cardiovascular   Rhythm and rate: regular and normal      Pulmonary    breath sounds clear to auscultation            Lab Results   Component Value Date    WBC 8.3 04/29/2019    HGB 13.3 04/29/2019    HCT 39.9 04/29/2019     04/29/2019    CRP <2.9 10/24/2017    SED 10 10/24/2017     11/28/2018    POTASSIUM 4.0 11/28/2018    CHLORIDE 107 11/28/2018    CO2 25 11/28/2018    BUN 12 11/28/2018    CR 0.78 11/28/2018    GLC 90 11/28/2018    BILLY 8.7 11/28/2018    ALBUMIN 3.6 11/28/2018    PROTTOTAL 7.0 11/28/2018    ALT 22 11/28/2018    AST 15 11/28/2018    ALKPHOS 61 11/28/2018    BILITOTAL 0.2 11/28/2018    TSH 1.36 11/28/2018    T4 1.14 11/29/2016    HCG  10/25/2010     Negative   This test provides a presumptive diagnosis of pregnancy or non-pregnancy. A   confirmed pregnancy diagnosis should only be made by a physician after all   clinical and laboratory findings have been evaluated.    HCGS Negative 04/07/2014       Preop Vitals  BP Readings from Last 3 Encounters:   05/02/19 136/66   04/29/19 124/80   04/10/19 122/86    Pulse Readings from Last 3 Encounters:   05/02/19 82   04/29/19 78   04/02/19 91      Resp Readings from Last 3 Encounters:   05/02/19 16   06/27/17 16   04/07/14 18    SpO2 Readings from Last 3  "Encounters:   05/02/19 96%   04/29/19 99%   04/02/19 97%      Temp Readings from Last 1 Encounters:   05/02/19 36.2  C (97.1  F) (Temporal)    Ht Readings from Last 1 Encounters:   05/02/19 1.651 m (5' 5\")      Wt Readings from Last 1 Encounters:   05/02/19 99.5 kg (219 lb 4.8 oz)    Estimated body mass index is 36.49 kg/m  as calculated from the following:    Height as of this encounter: 1.651 m (5' 5\").    Weight as of this encounter: 99.5 kg (219 lb 4.8 oz).       Anesthesia Plan      History & Physical Review  History and physical reviewed and following examination; no interval change.    ASA Status:  2 .    NPO Status:  > 6 hours    Plan for General and LMA with Intravenous and Propofol induction. Maintenance will be TIVA.    PONV prophylaxis:  Ondansetron (or other 5HT-3), Dexamethasone or Solumedrol, Scopolamine patch and Other (See comment) (TIVA)    Propofol infusion      Postoperative Care  Postoperative pain management:  Oral pain medications and Multi-modal analgesia.      Consents  Anesthetic plan, risks, benefits and alternatives discussed with:  Patient..                 Danial Gómez MD  "

## 2019-05-02 NOTE — DISCHARGE INSTRUCTIONS
Today you were given 975 mg of Tylenol at 7am. The recommended daily maximum dose is 4000 mg.     Information for Patients Discharging with a Transderm Scopolamine Patch     Dry mouth is a common side effect.    Drowsiness is another common side effect especially when combined with pain medication.  Please avoid activities that require mental alertness such as driving a car or making important legal decisions.    Since Scopolamine can cause temporary dilation of the pupils and blurred vision if it comes in contact with the eyes; be sure to wash your hands thoroughly with soap and water immediately after handling the patch.   When you remove your patch, please stick it to a tissue or paper towel for disposal.      Remove the patch immediately and contact a physician in the unlikely event that you experience symptoms of acute glaucoma (pain and reddening of the eyes, accompanied by dilated pupils).    Remove the patch if you develop any difficulties urinating.  If you cannot urinate after removing your patch, please notify your surgeon.    Remove the patch 24 hours after surgery.      Same Day Surgery Discharge Instructions for  Sedation and General Anesthesia       It's not unusual to feel dizzy, light-headed or faint for up to 24 hours after surgery or while taking pain medication.  If you have these symptoms: sit for a few minutes before standing and have someone assist you when you get up to walk or use the bathroom.      You should rest and relax for the next 24 hours. We recommend you make arrangements to have an adult stay with you for at least 24 hours after your discharge.  Avoid hazardous and strenuous activity.      DO NOT DRIVE any vehicle or operate mechanical equipment for 24 hours following the end of your surgery.  Even though you may feel normal, your reactions may be affected by the medication you have received.      Do not drink alcoholic beverages for 24 hours following surgery.       Slowly  progress to your regular diet as you feel able. It's not unusual to feel nauseated and/or vomit after receiving anesthesia.  If you develop these symptoms, drink clear liquids (apple juice, ginger ale, broth, 7-up, etc. ) until you feel better.  If your nausea and vomiting persists for 24 hours, please notify your surgeon.        All narcotic pain medications, along with inactivity and anesthesia, can cause constipation. Drinking plenty of liquids and increasing fiber intake will help.      For any questions of a medical nature, call your surgeon.      Do not make important decisions for 24 hours.      If you had general anesthesia, you may have a sore throat for a couple of days related to the breathing tube used during surgery.  You may use Cepacol lozenges to help with this discomfort.  If it worsens or if you develop a fever, contact your surgeon.       If you feel your pain is not well managed with the pain medications prescribed by your surgeon, please contact your surgeon's office to let them know so they can address your concerns.       POST-OPERATIVE INSTRUCTIONS FOLLOWING  D & C / ENDOMETRIAL ABLATION    ACTIVITY:  You may resume normal activities including lifting as needed.  It is permissible to drive a car and to climb stairs.  Baths or showers are perfectly acceptable.    CHECK-UP:  You should be seen 1 month after discharge unless home instruction sheet states otherwise and please phone the office the day after procedure and schedule an appointment with your physician.    VAGINAL DISCHARGE:  You may have some vaginal bleeding or discharge for about a week after discharge.  You should avoid douches, tampons, and intercourse for the first week.    TEMPERATURE:  If you develop temperature levels to over 100.4 F your physician should be called immediately.    DIET:  [place] or light diet is advisable the day of surgery.  If nausea persists, continue this diet.  If severe, call.    [facility]  [phone]      **If you have questions or concerns about your procedure,   call Dr. Duncan at [phone]**

## 2019-05-02 NOTE — ANESTHESIA CARE TRANSFER NOTE
Patient: Teresa Brian    Procedure(s):  HYSTEROSCOPY  FELICIA ABLATION    Diagnosis: MENORRHAGIA  Diagnosis Additional Information: No value filed.    Anesthesia Type:   General, LMA     Note:  Airway :Face Mask  Patient transferred to:PACU  Comments: A&O x 3.  C/O pain,  Denies N&V.  Report to RN.      Vitals: (Last set prior to Anesthesia Care Transfer)    CRNA VITALS  5/2/2019 0740 - 5/2/2019 0815      5/2/2019             Pulse:  79    SpO2:  99 %    Resp Rate (set):  10                Electronically Signed By: Marycruz Cody  May 2, 2019  8:15 AM

## 2019-05-02 NOTE — BRIEF OP NOTE
[facility]    Brief Operative Note    Pre-operative diagnosis: MENORRHAGIA  Post-operative diagnosis menorrhagia  Procedure: Procedure(s):  HYSTEROSCOPY  FELICIA ABLATION  Surgeon: Surgeon(s) and Role:     * Daniella Duncan MD - Primary  Anesthesia: General   Estimated blood loss: Minimal  Drains: None  Specimens:   ID Type Source Tests Collected by Time Destination   A : Endometrial Curettings Tissue Endometrium SURGICAL PATHOLOGY EXAM Daniella Duncan MD 5/2/2019  7:52 AM      Findings:   Normal uterine cavity.   7cm in sounding length.  Complications: None.  Implants:  * No implants in log *

## 2019-05-03 LAB — COPATH REPORT: NORMAL

## 2019-05-13 ENCOUNTER — MYC MEDICAL ADVICE (OUTPATIENT)
Dept: OBGYN | Facility: CLINIC | Age: 44
End: 2019-05-13

## 2019-05-15 NOTE — TELEPHONE ENCOUNTER
This doesn't sound unusual to me.  Patients report various different types of discharge   She is only a week out from surgery    If it becomes extremely heavy bleeding, then we need to see her.

## 2019-05-15 NOTE — TELEPHONE ENCOUNTER
5/2/19 Diagnostic hysteroscopy with dilation and curettage. Jennifer endometrial ablation. Pt is having bright red discharge since yesterday. Changes liner three times per day. Bleeding is like a light normal period. No clots. No foul smell. Abd is not tender to touch. Pt is having cramping. No clots. Wondering if this is OK. Routing to Dr. Weller, on call. Please advise.

## 2019-05-15 NOTE — TELEPHONE ENCOUNTER
Dr. Weller reviewed message. States can monitor for now. Call if having heavy bleeding, soaking one pad per hour. Pt informed. It is not time for her cycle. Pt informed could do UPT. Pt declines. Pt's  had vasectomy.

## 2019-06-18 ENCOUNTER — MYC MEDICAL ADVICE (OUTPATIENT)
Dept: OBGYN | Facility: CLINIC | Age: 44
End: 2019-06-18

## 2019-06-18 NOTE — TELEPHONE ENCOUNTER
"I haven't had women have more cramps after the ablation in general; is she having any bleeding/spotting/discharge?  Has she tried anything for the cramping?  Ibuprofen, aleve, tyenol, heating pad, etc?  Any fever/chills?  Any bowel/bladder issues?  If not to all of these, I would continue to monitor it for now; if pain were to persist or worsen, she should be seen.  If improves, I would have her watch over the next couple of months around her \"period time\".  If pain were to continue (which I wouldn't expect), she should come in to discuss other options  "

## 2019-08-05 ENCOUNTER — OFFICE VISIT (OUTPATIENT)
Dept: FAMILY MEDICINE | Facility: CLINIC | Age: 44
End: 2019-08-05
Payer: COMMERCIAL

## 2019-08-05 VITALS
WEIGHT: 214 LBS | TEMPERATURE: 98.2 F | DIASTOLIC BLOOD PRESSURE: 78 MMHG | OXYGEN SATURATION: 97 % | BODY MASS INDEX: 35.65 KG/M2 | HEIGHT: 65 IN | SYSTOLIC BLOOD PRESSURE: 108 MMHG | HEART RATE: 87 BPM

## 2019-08-05 DIAGNOSIS — M25.511 ACUTE PAIN OF RIGHT SHOULDER: Primary | ICD-10-CM

## 2019-08-05 PROCEDURE — 99213 OFFICE O/P EST LOW 20 MIN: CPT | Performed by: PHYSICIAN ASSISTANT

## 2019-08-05 RX ORDER — NAPROXEN 500 MG/1
500 TABLET ORAL 2 TIMES DAILY WITH MEALS
Qty: 60 TABLET | Refills: 0 | Status: SHIPPED | OUTPATIENT
Start: 2019-08-05 | End: 2021-02-15

## 2019-08-05 ASSESSMENT — MIFFLIN-ST. JEOR: SCORE: 1621.58

## 2019-08-05 NOTE — PATIENT INSTRUCTIONS
History/exam is most suggestive of rotator cuff tendonitis.  Discussed referral to ortho versus a bit longer with conservative management and PT.  Pt would like to pursue conservative treatment and PT.  Orders placed.  Rest, ice, gentle stretching and NSAIDs.   Will try naproxen since no improvement with ibuprofen.

## 2019-08-05 NOTE — PROGRESS NOTES
"Subjective     Teresa Brian is a 44 year old female who presents to clinic today for the following health issues:    HPI   1) Joint Pain    Onset: ongoing issue for at least 5 years, but worse in the last two weeks. Has never been evaluated for this before. Just intermittent the past 5 yrs and now constant.    Has been fishing more past 2 weeks and this may have irritated it - repetitive external rotation and flexion. 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 Does feel like this helped maintain her ROM though so was kind of a win/loose situation.     R-handed    Description:   Location: right shoulder pain  Character: throbbing - and limited range of motion without pain becoming a lot worse - sometimes just an ache.      Intensity: moderate    Progression of Symptoms: worse    Accompanying Signs & Symptoms:  Other symptoms: none    History:   Previous similar pain: no       Precipitating factors:   Trauma or overuse: no     Alleviating factors:  Improved by: advil (600mg every 4-6 hours) and ice seem to help a little bit    Therapies Tried and outcome: as above      2) Discuss lab results - from [facility] over the weekend for \"squeezing\" CP that woke her up in the morning. Occurred 3x and chest just felt tight so went to .   Boyfriends best friend  of MI earlier this year and admits to some anxiety about this so figured she should be evaluated.   Had normal CXR and EKG, but had some lab abnormalities so wanted to review these.   BS was a bit low at 68, but pt admits she hadn't eaten anything since lunch time so had been at least 8 hours since she'd eaten last.  Otherwise, all labs were normal including troponin, hepatic profile, CBC, BMP.   No issues with CP since.      Patient Active Problem List   Diagnosis     CARDIOVASCULAR SCREENING; LDL GOAL LESS THAN 160     Menorrhagia     " History of cervical dysplasia     Migraine with aura and without status migrainosus, not intractable     Non morbid obesity due to excess calories     Family history of Parkinson disease - Father early onset around age 30-40's     S/P ACL reconstruction     Epigastric pain- chronic intermittent since 4/2017. Extensive normal work-up: hepatic/celiac panels, lipase, TSH, CBC, CRP/ESR, EGD, colonoscopy, HIDA scan, CT abdomen.Per GI may be functional origin      Acute pain of right shoulder     Past Surgical History:   Procedure Laterality Date     ARTHROSCOPIC RECONSTRUCTION ANTERIOR CRUCIATE LIGAMENT Right 10/2010    Reconstruction (ACL, MCL) and fibular fx     CATARACT IOL, RT/LT      5/2017, 9/2017     COLONOSCOPY       COLONOSCOPY N/A 6/27/2017    Procedure: COLONOSCOPY;;  Surgeon: Allan Olivier MD;  Location:  GI     COLPOSCOPY CERVIX, LOOP ELECTRODE BIOPSY, COMBINED  08/2011    squamous metaplasia     COMBINED DILATION AND CURETTAGE, ABLATE ENDOMETRIUM FELICIA N/A 5/2/2019    Procedure: FELICIA ABLATION;  Surgeon: Daniella Duncan MD;  Location:  OR     ESOPHAGOSCOPY, GASTROSCOPY, DUODENOSCOPY (EGD), COMBINED       ESOPHAGOSCOPY, GASTROSCOPY, DUODENOSCOPY (EGD), COMBINED N/A 6/27/2017    Procedure: COMBINED ESOPHAGOSCOPY, GASTROSCOPY, DUODENOSCOPY (EGD), BIOPSY SINGLE OR MULTIPLE;  ESOPHAGOSCOPY, GASTROSCOPY, DUODENOSCOPY (EGD with biopsies by cold forcep, Colonoscopy with biopsies by cold forcep.;  Surgeon: Allan Olivier MD;  Location:  GI     HC TOOTH EXTRACTION W/FORCEP  2000    wisdom teeth extraction     LAPAROSCOPY DIAGNOSTIC (GYN)  1992    negative ([facility])     OPERATIVE HYSTEROSCOPY N/A 5/2/2019    Procedure: HYSTEROSCOPY;  Surgeon: Daniella Duncan MD;  Location:  OR       Social History     Tobacco Use     Smoking status: Never Smoker     Smokeless tobacco: Never Used   Substance Use Topics     Alcohol use: Yes     Alcohol/week: 1.2 oz     Types: 2 Standard drinks or equivalent  per week     Comment: 2 drinks per week avg     Family History   Problem Relation Age of Onset     Neurologic Disorder Mother         Fibromyalgia. migraines     Neurologic Disorder Father         parkinson with early dementia     Colon Cancer No family hx of      Thyroid Disease No family hx of      Diabetes No family hx of      Hypertension No family hx of      Hyperlipidemia No family hx of      Breast Cancer No family hx of          Current Outpatient Medications   Medication Sig Dispense Refill     naproxen (NAPROSYN) 500 MG tablet Take 1 tablet (500 mg) by mouth 2 times daily (with meals) 60 tablet 0     acetaminophen (TYLENOL) 325 MG tablet Take 325-650 mg by mouth every 6 hours as needed for mild pain       acetaminophen (TYLENOL) 325 MG tablet Take 2 tablets (650 mg) by mouth every 4 hours as needed for mild pain 50 tablet 0     ALPRAZolam (XANAX) 0.25 MG tablet Take 30 minutes prior to daytime flight. 4 tablet 0     ibuprofen (ADVIL/MOTRIN) 200 MG capsule Take 600 mg by mouth every 4 hours as needed for fever       ibuprofen (ADVIL/MOTRIN) 600 MG tablet Take 1 tablet (600 mg) by mouth every 6 hours as needed for other (mild and/or inflammatory pain) 30 tablet 0     LORazepam (ATIVAN) 0.5 MG tablet Take 0.5-1 tablets (0.25-0.5 mg) by mouth every 8 hours as needed for anxiety Take 30 minutes prior to departure.  Do not operate a vehicle after taking this medication 4 tablet 0     multivitamin w/minerals (MULTI-VITAMIN) tablet Take 1 tablet by mouth daily       SUMAtriptan (IMITREX) 25 MG tablet Take 1-2 tablets (25-50 mg) by mouth at onset of headache for migraine May repeat in 2 hours. Max 8 tablets/24 hours. 9 tablet 1     vitamin B complex with vitamin C (VITAMIN  B COMPLEX) tablet Take 1 tablet by mouth daily       Allergies   Allergen Reactions     Vicodin [Hydrocodone-Acetaminophen] Nausea and Vomiting         Reviewed and updated as needed this visit by Provider  Tobacco  Allergies  Meds  Med Hx   "Surg Hx  Fam Hx  Soc Hx        Review of Systems   ROS COMP: Constitutional, CV, respiratory, MSK, SKIN systems are negative, except as otherwise noted.      Objective    /78 (BP Location: Right arm, Cuff Size: Adult Large)   Pulse 87   Temp 98.2  F (36.8  C) (Oral)   Ht 1.651 m (5' 5\")   Wt 97.1 kg (214 lb)   LMP 04/27/2019   SpO2 97%   Breastfeeding? No   BMI 35.61 kg/m    Body mass index is 35.61 kg/m .  Physical Exam   GENERAL: healthy, alert and no distress  MS: no swelling, redness, deformity or atrophy is noted of R shoulder. Pain incited with ROM above the horizontal with flexion and abduction. Can demonstrate FROM, but has pain of R shoulder with Hawkin's, empty can and push off testing. Does also have mild weakness with empty can and push-off testing on the R. Equal and symmetric  strength. No TTP of clavicle, AC joint, acromion, or scapular spine.     Diagnostic Test Results:  Labs reviewed in Epic        Assessment & Plan       ICD-10-CM    1. Acute pain of right shoulder M25.511 PHYSICAL THERAPY REFERRAL     naproxen (NAPROSYN) 500 MG tablet   See Patient Instructions  Risks/appropriate use of NSAIDs reviewed as well.  Patient in agreement with plan.     Patient Instructions   History/exam is most suggestive of rotator cuff tendonitis.  Discussed referral to ortho versus a bit longer with conservative management and PT.  Pt would like to pursue conservative treatment and PT.  Orders placed.  Rest, ice, gentle stretching and NSAIDs.   Will try naproxen since no improvement with ibuprofen.         Return in about 1 week (around 8/12/2019) for with physical therapy.    Anastasia Norton PA-C  [facility] [place]        "

## 2019-08-08 ENCOUNTER — THERAPY VISIT (OUTPATIENT)
Dept: PHYSICAL THERAPY | Facility: CLINIC | Age: 44
End: 2019-08-08
Attending: PHYSICIAN ASSISTANT
Payer: COMMERCIAL

## 2019-08-08 DIAGNOSIS — M25.511 ACUTE PAIN OF RIGHT SHOULDER: ICD-10-CM

## 2019-08-08 PROCEDURE — 97161 PT EVAL LOW COMPLEX 20 MIN: CPT | Mod: GP | Performed by: PHYSICAL THERAPIST

## 2019-08-08 PROCEDURE — 97110 THERAPEUTIC EXERCISES: CPT | Mod: GP | Performed by: PHYSICAL THERAPIST

## 2019-08-08 NOTE — PROGRESS NOTES
[facility] Initial Evaluation  Subjective:  The history is provided by the patient. No  was used.   Teresa Brian being seen for R shoulder pain.   Problem began 1/1/2009. Problem occurred: Pt notes that her pain developed gradually over the past 5-10 years.  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  Pt also does a lot of tournament fishing, and is struggling to participate in that any longer.  Pt now c/o pain that is constant, previously was intermittent with clicking.    and reported as 8/10 on pain scale. General health as reported by patient is fair. Pertinent medical history includes:  Overweight and migraines/headaches.      Current medications:  Anti-inflammatory.   Primary job tasks include:  Computer work.  Pain is described as aching and is constant. Pain is the same all the time. Since onset symptoms are gradually worsening.      Patient is  - mostly sitting.   Barriers include:  None as reported by patient.  Red flags:  None as reported by patient.  Type of problem:  Right shoulder        Patient reports pain:  Upper arm and anterior.  Associated symptoms:  Locking, painful arc and loss of strength (clicking). Exacerbated by: fishing, working, everything. and relieved by rest.                      Objective:  System                   Shoulder Evaluation:  ROM:  AROM:    Flexion:  Left:  Wnl    Right:  135 pdm +++          External Rotation:  Left:  Wnl    Right:  Wnl, erp            Extension/Internal Rotation:  Left:  Wnl    Right:  L5 erp++          Strength:    Flexion: Right: /5 weak/painful    Pain:   Extension:  Right: /5  Strong/painful  Pain:      Internal Rotation:  Right:/5  Weak/painful    Pain:  External Rotation:   Right:/5  Weak/painful    Pain:            Stability Testing:      Right shoulder stability positive testing:Sulcus sign and External Rotation  Special Tests:       Right shoulder positive for the following special tests:Labral; Impingement and Rotator cuff tear                                       General     ROS    Assessment/Plan:    Patient is a 44 year old female with right side shoulder complaints.    Patient has the following significant findings with corresponding treatment plan.                Diagnosis 1:  R shoulder pain      Pain -  hot/cold therapy, manual therapy, self management, education and home program  Decreased ROM/flexibility - manual therapy and therapeutic exercise  Decreased strength - therapeutic exercise and therapeutic activities  Inflammation - cold therapy  Impaired muscle performance - neuro re-education  Decreased function - therapeutic activities    Therapy Evaluation Codes:   1) History comprised of:   Personal factors that impact the plan of care:      Past/current experiences and Time since onset of symptoms.    Comorbidity factors that impact the plan of care are:      None.     Medications impacting care: Anti-inflammatory.  2) Examination of Body Systems comprised of:   Body structures and functions that impact the plan of care:      Shoulder.   Activity limitations that impact the plan of care are:      Lifting, Sleeping, Laying down and reaching.  3) Clinical presentation characteristics are:   Unstable/Unpredictable.  4) Decision-Making    Low complexity using standardized patient assessment instrument and/or measureable assessment of functional outcome.  Cumulative Therapy Evaluation is: Low complexity.    Previous and current functional limitations:  (See Goal Flow Sheet for this information)    Short term and Long term goals: (See Goal Flow Sheet for this information)     Communication ability:  Patient appears to be able to clearly communicate and understand verbal and written communication and follow directions correctly.  Treatment Explanation - The following has been discussed with the patient:   RX ordered/plan of  care  Anticipated outcomes  Possible risks and side effects  This patient would benefit from PT intervention to resume normal activities.   Rehab potential is fair - extremely painful.    Frequency:  1 X week, once daily  Duration:  for 6 weeks  Discharge Plan:  Achieve all LTG.  Independent in home treatment program.  Reach maximal therapeutic benefit.    Please refer to the daily flowsheet for treatment today, total treatment time and time spent performing 1:1 timed codes.

## 2019-08-20 ENCOUNTER — THERAPY VISIT (OUTPATIENT)
Dept: PHYSICAL THERAPY | Facility: CLINIC | Age: 44
End: 2019-08-20
Attending: PHYSICIAN ASSISTANT
Payer: COMMERCIAL

## 2019-08-20 DIAGNOSIS — M25.511 ACUTE PAIN OF RIGHT SHOULDER: ICD-10-CM

## 2019-08-20 PROCEDURE — 97112 NEUROMUSCULAR REEDUCATION: CPT | Mod: GP | Performed by: PHYSICAL THERAPIST

## 2019-08-20 PROCEDURE — 97140 MANUAL THERAPY 1/> REGIONS: CPT | Mod: GP | Performed by: PHYSICAL THERAPIST

## 2019-08-20 PROCEDURE — 97110 THERAPEUTIC EXERCISES: CPT | Mod: GP | Performed by: PHYSICAL THERAPIST

## 2019-09-29 ENCOUNTER — HEALTH MAINTENANCE LETTER (OUTPATIENT)
Age: 44
End: 2019-09-29

## 2019-10-11 PROBLEM — M25.511 ACUTE PAIN OF RIGHT SHOULDER: Status: RESOLVED | Noted: 2019-08-08 | Resolved: 2019-10-11

## 2019-10-11 NOTE — PROGRESS NOTES
Discharge Note    Progress reporting period is from initial evaluation date (please see noted date below) to Aug 20, 2019.  No linked episodes      Teresa failed to follow up and current status is unknown.  Please see information below for last relevant information on current status.  Patient seen for 2 visits.    SUBJECTIVE  Subjective changes noted by patient:  Pt is feeling slightly better, but has been really up and down.  .  Current pain level is 8/10.     Previous pain level was  8/10.   Changes in function:  Yes (See Goal flowsheet attached for changes in current functional level)  Adverse reaction to treatment or activity: None    OBJECTIVE  Changes noted in objective findings: AROM R shoulder: flex 120 pdm, ER 50 pdm, ir/ext L5 pdm.     ASSESSMENT/PLAN  Diagnosis: R shoulder pain   STG/LTGs have been met or progress has been made towards goals:  Yes, please see goal flowsheet for most current information  Assessment of Progress: current status is unknown.    Last current status: Pt has not made progress   Self Management Plans:  HEP  I have re-evaluated this patient and find that the nature, scope, duration and intensity of the therapy is appropriate for the medical condition of the patient.  Teresa continues to require the following intervention to meet STG and LTG's:  HEP.    Recommendations:  Discharge with current home program.  Patient to follow up with MD as needed.    Please refer to the daily flowsheet for treatment today, total treatment time and time spent performing 1:1 timed codes.

## 2019-10-22 ENCOUNTER — OFFICE VISIT (OUTPATIENT)
Dept: DERMATOLOGY | Facility: CLINIC | Age: 44
End: 2019-10-22
Payer: COMMERCIAL

## 2019-10-22 DIAGNOSIS — L81.4 LENTIGO: ICD-10-CM

## 2019-10-22 DIAGNOSIS — D22.9 NEVUS: ICD-10-CM

## 2019-10-22 DIAGNOSIS — D18.01 ANGIOMA OF SKIN: ICD-10-CM

## 2019-10-22 DIAGNOSIS — L82.1 SEBORRHEIC KERATOSIS: ICD-10-CM

## 2019-10-22 DIAGNOSIS — L57.0 AK (ACTINIC KERATOSIS): Primary | ICD-10-CM

## 2019-10-22 DIAGNOSIS — D23.9 DERMATOFIBROMA: ICD-10-CM

## 2019-10-22 PROCEDURE — 99203 OFFICE O/P NEW LOW 30 MIN: CPT | Mod: 25 | Performed by: PHYSICIAN ASSISTANT

## 2019-10-22 PROCEDURE — 17000 DESTRUCT PREMALG LESION: CPT | Performed by: PHYSICIAN ASSISTANT

## 2019-10-22 NOTE — PROGRESS NOTES
HPI:   Chief complaints: Teresa Brian (Alie) is a 44 year old female who presents for Full skin cancer screening to rule out skin cancer   Last Skin Exam: n/a      1st Baseline: YES  Personal HX of Skin Cancer: none   Personal HX of Malignant Melanoma: none   Family HX of Skin Cancer / Malignant Melanoma: none  Personal HX of Atypical Moles: none  Risk factors: frequent sun exposure, frequent sun exposure in her youth, diligent sunscreen user   New / Changing lesions: yes, spots on face  Social History: lives in [place], works in [place]. She works as a professional angler.  On review of systems, there are no further skin complaints, patient is feeling otherwise well.  See patient intake sheet.  ROS of the following were done and are negative: Constitutional, Eyes, Ears, Nose,   Mouth, Throat, Cardiovascular, Respiratory, GI, Genitourinary, Musculoskeletal,   Psychiatric, Endocrine, Allergic/Immunologic.    This document serves as a record of the services and decisions personally performed and made by Leanna Heath, MS, PA-C. It was created on her behalf by Lolis Ash, a trained medical scribe. The creation of this document is based on the provider's statements to the medical scribe.  Lolis Ash 5:06 PM October 22, 2019    PHYSICAL EXAM:   BP (P) 130/78   Pulse (P) 74   SpO2 (P) 99%   Breastfeeding? No   Skin exam performed as follows: Type 2 skin. Mood appropriate  Alert and Oriented X 3. Well developed, well nourished in no distress.  General appearance: Normal  Head including face: Normal  Eyes: conjunctiva and lids: Normal  Mouth: Lips, teeth, gums: Normal  Neck: Normal  Chest-breast/axillae: Normal  Back: Normal  Spleen and liver: Normal  Cardiovascular: Exam of peripheral vascular system by observation for swelling, varicosities, edema: Normal  Genitalia: groin, buttocks: Normal  Extremities: digits/nails (clubbing): Normal  Eccrine and Apocrine glands:  Normal  Right upper extremity: Normal  Left upper extremity: Normal  Right lower extremity: Normal  Left lower extremity: Normal  Skin: Scalp and body hair: See below    Pt deferred exam of breasts, groin, buttocks: No    Other physical findings:  1. Multiple pigmented macules on extremities and trunk  2. Multiple pigmented macules on face, trunk and extremities  3. Multiple vascular papules on trunk, arms and legs  4. Multiple scattered keratotic plaques  5. Pink gritty papule on right nasal side wall x 1  6. Firm papule with dimple sign on the right lower leg       Except as noted above, no other signs of skin cancer or melanoma.     ASSESSMENT/PLAN:   Benign Full skin cancer screening today.     Patient with history of none  Advised on monthly self exams and 1 year  Patient Education: Appropriate brochures given.    1. Multiple benign appearing nevi on arms, legs and trunk. Discussed ABCDEs of melanoma and sunscreen.   2. Multiple lentigos on arms, legs and trunk. Advised benign, no treatment needed.  3. Multiple scattered angiomas. Advised benign, no treatment needed.   4. Seborrheic keratosis on arms, legs and trunk. Advised benign, no treatment needed.  5. Actinic keratosis on right nasal side wall x 1. As precancerous, cryosurgery performed. Advised on blistering and post-op care. Advised if not resolved in 1-2 months to return for evaluation  6. Dermatofibroma on right lower leg x 1. Advised benign no treatment needed.   7. Nichole to follow up with Primary Care provider regarding elevated blood pressure.      Follow-up: yearly FSE/PRN sooner    1.) Patient was asked about new and changing moles. YES  2.) Patient received a complete physical skin examination: YES  3.) Patient was counseled to perform a monthly self skin examination: YES  Scribed By: Lolis Ash, Medical Scribe    The information in this document, created by the medical scribe for me, accurately reflects the services I personally  performed and the decisions made by me. I have reviewed and approved this document for accuracy prior to leaving the patient care area.  October 22, 2019 5:13 PM    Leanna Heath MS, PA-C

## 2019-10-22 NOTE — LETTER
10/22/2019         RE: Teresa Brian  [address] [facility] [address]        Dear Colleague,    Thank you for referring your patient, Teresa Brian, to the [facility]. Please see a copy of my visit note below.    HPI:   Chief complaints: Teresa Brian (Alie) is a 44 year old female who presents for Full skin cancer screening to rule out skin cancer   Last Skin Exam: n/a      1st Baseline: YES  Personal HX of Skin Cancer: none   Personal HX of Malignant Melanoma: none   Family HX of Skin Cancer / Malignant Melanoma: none  Personal HX of Atypical Moles: none  Risk factors: frequent sun exposure, frequent sun exposure in her youth, diligent sunscreen user   New / Changing lesions: yes, spots on face  Social History: lives in [place], works in [place]. She works as a professional angler.  On review of systems, there are no further skin complaints, patient is feeling otherwise well.  See patient intake sheet.  ROS of the following were done and are negative: Constitutional, Eyes, Ears, Nose,   Mouth, Throat, Cardiovascular, Respiratory, GI, Genitourinary, Musculoskeletal,   Psychiatric, Endocrine, Allergic/Immunologic.    This document serves as a record of the services and decisions personally performed and made by Leanna Heath, MS, PA-C. It was created on her behalf by Lolis Ash, a trained medical scribe. The creation of this document is based on the provider's statements to the medical scribe.  Lolis Ash 5:06 PM October 22, 2019    PHYSICAL EXAM:   BP (P) 130/78   Pulse (P) 74   SpO2 (P) 99%   Breastfeeding? No   Skin exam performed as follows: Type 2 skin. Mood appropriate  Alert and Oriented X 3. Well developed, well nourished in no distress.  General appearance: Normal  Head including face: Normal  Eyes: conjunctiva and lids: Normal  Mouth: Lips, teeth, gums: Normal  Neck: Normal  Chest-breast/axillae:  Normal  Back: Normal  Spleen and liver: Normal  Cardiovascular: Exam of peripheral vascular system by observation for swelling, varicosities, edema: Normal  Genitalia: groin, buttocks: Normal  Extremities: digits/nails (clubbing): Normal  Eccrine and Apocrine glands: Normal  Right upper extremity: Normal  Left upper extremity: Normal  Right lower extremity: Normal  Left lower extremity: Normal  Skin: Scalp and body hair: See below    Pt deferred exam of breasts, groin, buttocks: No    Other physical findings:  1. Multiple pigmented macules on extremities and trunk  2. Multiple pigmented macules on face, trunk and extremities  3. Multiple vascular papules on trunk, arms and legs  4. Multiple scattered keratotic plaques  5. Pink gritty papule on right nasal side wall x 1  6. Firm papule with dimple sign on the right lower leg       Except as noted above, no other signs of skin cancer or melanoma.     ASSESSMENT/PLAN:   Benign Full skin cancer screening today.     Patient with history of none  Advised on monthly self exams and 1 year  Patient Education: Appropriate brochures given.    1. Multiple benign appearing nevi on arms, legs and trunk. Discussed ABCDEs of melanoma and sunscreen.   2. Multiple lentigos on arms, legs and trunk. Advised benign, no treatment needed.  3. Multiple scattered angiomas. Advised benign, no treatment needed.   4. Seborrheic keratosis on arms, legs and trunk. Advised benign, no treatment needed.  5. Actinic keratosis on right nasal side wall x 1. As precancerous, cryosurgery performed. Advised on blistering and post-op care. Advised if not resolved in 1-2 months to return for evaluation  6. Dermatofibroma on right lower leg x 1. Advised benign no treatment needed.   7. Nichole to follow up with Primary Care provider regarding elevated blood pressure.      Follow-up: yearly FSE/PRN sooner    1.) Patient was asked about new and changing moles. YES  2.) Patient received a complete physical skin  examination: YES  3.) Patient was counseled to perform a monthly self skin examination: YES  Scribed By: Lolis Ash, Medical Scribe    The information in this document, created by the medical scribe for me, accurately reflects the services I personally performed and the decisions made by me. I have reviewed and approved this document for accuracy prior to leaving the patient care area.  October 22, 2019 5:13 PM    Leanna Heath MS, PA-C        Again, thank you for allowing me to participate in the care of your patient.        Sincerely,        Leanna Heath PA-C

## 2019-10-28 ENCOUNTER — MYC MEDICAL ADVICE (OUTPATIENT)
Dept: DERMATOLOGY | Facility: CLINIC | Age: 44
End: 2019-10-28

## 2019-10-28 NOTE — LETTER
[address] [address]  Phone: [phone]  Fax: [phone]    November 5, 2019      Teresa Brian                                                                                                               [address] [facility] [address]            Dear Ms. Brian,    We have been trying to get a hold of you in regards to a Dermatology inquiry you placed.     Please give us a call back at your earliest convenience.      Thank you,      [facility]

## 2019-10-28 NOTE — TELEPHONE ENCOUNTER
Called and LM for patient to call back in regards to symptoms post cryo.    Guy RN-BSN-PHN  [facility]  [phone]

## 2019-10-29 NOTE — TELEPHONE ENCOUNTER
Called and LM for patient to call back in regards to symptoms post cryo.    Guy RN-BSN-PHN  [facility]  [phone]

## 2019-10-30 NOTE — TELEPHONE ENCOUNTER
Called and LM for patient to call back in regards to symptoms post cryo.    Guy RN-BSN-PHN  [facility]  [phone]

## 2019-11-05 NOTE — TELEPHONE ENCOUNTER
After 3 phone call attempts and 1 mychart message, snail mail letter sent:    Dear Ms. Brian,    We have been trying to get a hold of you in regards to a Dermatology inquiry you placed.     Please give us a call back at your earliest convenience.      Thank you,      [facility]

## 2019-12-09 ENCOUNTER — OFFICE VISIT (OUTPATIENT)
Dept: INTERNAL MEDICINE | Facility: CLINIC | Age: 44
End: 2019-12-09
Payer: COMMERCIAL

## 2019-12-09 VITALS
BODY MASS INDEX: 36.8 KG/M2 | OXYGEN SATURATION: 99 % | TEMPERATURE: 98.1 F | HEART RATE: 82 BPM | HEIGHT: 65 IN | WEIGHT: 220.9 LBS | SYSTOLIC BLOOD PRESSURE: 118 MMHG | DIASTOLIC BLOOD PRESSURE: 84 MMHG

## 2019-12-09 DIAGNOSIS — Z01.818 PREOP GENERAL PHYSICAL EXAM: Primary | ICD-10-CM

## 2019-12-09 DIAGNOSIS — M75.41 IMPINGEMENT SYNDROME, SHOULDER, RIGHT: ICD-10-CM

## 2019-12-09 PROCEDURE — 99203 OFFICE O/P NEW LOW 30 MIN: CPT | Performed by: INTERNAL MEDICINE

## 2019-12-09 ASSESSMENT — MIFFLIN-ST. JEOR: SCORE: 1652.88

## 2019-12-09 NOTE — NURSING NOTE
"Chief Complaint   Patient presents with     Pre-Op Exam     /84   Pulse 82   Temp 98.1  F (36.7  C) (Temporal)   Ht 1.651 m (5' 5\")   Wt 100.2 kg (220 lb 14.4 oz)   LMP 11/18/2019   SpO2 99%   Breastfeeding No   BMI 36.76 kg/m   Estimated body mass index is 36.76 kg/m  as calculated from the following:    Height as of this encounter: 1.651 m (5' 5\").    Weight as of this encounter: 100.2 kg (220 lb 14.4 oz).        Health Maintenance due pending provider review:  NONE    n/a    Ana Domínguez CMA  "

## 2019-12-09 NOTE — PROGRESS NOTES
[address] [id number] [phone]  Dept: [phone]    PRE-OP EVALUATION:  Today's date: 2019    Teresa Brian (: 1975) presents for pre-operative evaluation assessment as requested by Dr. Jose.  She requires evaluation and anesthesia risk assessment prior to undergoing surgery/procedure for treatment of R shoulder .    Fax number for surgical facility: [phone]  Primary Physician: Anastasia Norton  Type of Anesthesia Anticipated: to be determined    Patient has a Health Care Directive or Living Will:  NO    Preop Questions 2019   Who is doing your surgery? Dr Dorcas Jose   What are you having done? Shoulder arthroscopy   Date of Surgery/Procedure:    Facility or Hospital where procedure/surgery will be performed: [facility]   1.  Do you have a history of Heart attack, stroke, stent, coronary bypass surgery, or other heart surgery? No   2.  Do you ever have any pain or discomfort in your chest? No   3.  Do you have a history of  Heart Failure? No   4.   Are you troubled by shortness of breath when:  walking on a level surface, or up a slight hill, or at night? No   5.  Do you currently have a cold, bronchitis or other respiratory infection? No   6.  Do you have a cough, shortness of breath, or wheezing? No   7.  Do you sometimes get pains in the calves of your legs when you walk? No   8. Do you or anyone in your family have previous history of blood clots? YES - Father   9.  Do you or does anyone in your family have a serious bleeding problem such as prolonged bleeding following surgeries or cuts? No   10. Have you ever had problems with anemia or been told to take iron pills? No   11. Have you had any abnormal blood loss such as black, tarry or bloody stools, or abnormal vaginal bleeding? No   12. Have you ever had a blood transfusion? No   13. Have you or any of your relatives  ever had problems with anesthesia? YES - self, vomiting/nausea   14. Do you have sleep apnea, excessive snoring or daytime drowsiness? No   15. Do you have any prosthetic heart valves? No   16. Do you have prosthetic joints? No   17. Is there any chance that you may be pregnant? No         HPI:     Pt has R shoulder impingement not responding to conservative rx.      See problem list for active medical problems.  Problems all longstanding and stable, except as noted/documented.  See ROS for pertinent symptoms related to these conditions.      MEDICAL HISTORY:     Patient Active Problem List    Diagnosis Date Noted     Epigastric pain- chronic intermittent since 4/2017. Extensive normal work-up: hepatic/celiac panels, lipase, TSH, CBC, CRP/ESR, EGD, colonoscopy, HIDA scan, CT abdomen.Per GI may be functional origin  04/29/2019     Priority: Medium     Pt offered trial of nortriptyline by GI, but she declines.  Was an element of constipation - used miralax for 1 month.        Family history of Parkinson disease - Father early onset around age 30-40's 11/28/2018     Priority: Medium     Non morbid obesity due to excess calories 09/07/2017     Priority: Medium     Migraine with aura and without status migrainosus, not intractable 04/27/2017     Priority: Medium     History of cervical dysplasia      Priority: Medium     (2009) JOANNA I  ([facility])  (2010) LGSIL, ASC-US, +HRHPV;  colpo -->JOANNA I and focal JOANNA II  (2011) LGSIL;  LEEP-->sq metaplasia  (2012) neg pap, +HRHPV  (2013) neg pap, neg HPV  (2016) neg pap, neg HPV;  Resume routine screening       Menorrhagia 10/06/2015     Priority: Medium     S/P ACL reconstruction 11/19/2010     Priority: Medium     CARDIOVASCULAR SCREENING; LDL GOAL LESS THAN 160 10/31/2010     Priority: Medium      Past Medical History:   Diagnosis Date     Asthma      BMI 34.0-34.9,adult 10/6/2015     CARDIOVASCULAR SCREENING; LDL GOAL LESS THAN 160      Cervical dysplasia 2010 2009-colpo  @ [facility]-->JOANNA 1; persistent LGSIL/ASC-US paps; colposcopy 12/2010 with JOANNA 1, focal JOANNA 2 and benign ECC     Condyloma acuminatum 4/2009    perirecal BX      Herpes simplex virus infection     HSV-1     Menarche age 15    cycles q 3-4 x 3-4 d w cramps     Menorrhagia      Migraine      Personal history of cervical dysplasia 2010    (2009) JOANNA I; (2010) JOANNA I and focal JOANNA II     PONV (postoperative nausea and vomiting)      Past Surgical History:   Procedure Laterality Date     ARTHROSCOPIC RECONSTRUCTION ANTERIOR CRUCIATE LIGAMENT Right 10/2010    Reconstruction (ACL, MCL) and fibular fx     CATARACT IOL, RT/LT      5/2017, 9/2017     COLONOSCOPY       COLONOSCOPY N/A 6/27/2017    Procedure: COLONOSCOPY;;  Surgeon: Allan Olivier MD;  Location:  GI     COLPOSCOPY CERVIX, LOOP ELECTRODE BIOPSY, COMBINED  08/2011    squamous metaplasia     COMBINED DILATION AND CURETTAGE, ABLATE ENDOMETRIUM FELICIA N/A 5/2/2019    Procedure: FELICIA ABLATION;  Surgeon: Daniella Duncan MD;  Location:  OR     ESOPHAGOSCOPY, GASTROSCOPY, DUODENOSCOPY (EGD), COMBINED       ESOPHAGOSCOPY, GASTROSCOPY, DUODENOSCOPY (EGD), COMBINED N/A 6/27/2017    Procedure: COMBINED ESOPHAGOSCOPY, GASTROSCOPY, DUODENOSCOPY (EGD), BIOPSY SINGLE OR MULTIPLE;  ESOPHAGOSCOPY, GASTROSCOPY, DUODENOSCOPY (EGD with biopsies by cold forcep, Colonoscopy with biopsies by cold forcep.;  Surgeon: Allan Olivier MD;  Location:  GI     HC TOOTH EXTRACTION W/FORCEP  2000    wisdom teeth extraction     LAPAROSCOPY DIAGNOSTIC (GYN)  1992    negative ([facility])     OPERATIVE HYSTEROSCOPY N/A 5/2/2019    Procedure: HYSTEROSCOPY;  Surgeon: Daniella Duncan MD;  Location:  OR     Current Outpatient Medications   Medication Sig Dispense Refill     acetaminophen (TYLENOL) 325 MG tablet Take 2 tablets (650 mg) by mouth every 4 hours as needed for mild pain 50 tablet 0     ALPRAZolam (XANAX) 0.25 MG tablet Take 30 minutes prior to daytime flight. 4  "tablet 0     ibuprofen (ADVIL/MOTRIN) 600 MG tablet Take 1 tablet (600 mg) by mouth every 6 hours as needed for other (mild and/or inflammatory pain) 30 tablet 0     LORazepam (ATIVAN) 0.5 MG tablet Take 0.5-1 tablets (0.25-0.5 mg) by mouth every 8 hours as needed for anxiety Take 30 minutes prior to departure.  Do not operate a vehicle after taking this medication 4 tablet 0     multivitamin w/minerals (MULTI-VITAMIN) tablet Take 1 tablet by mouth daily       naproxen (NAPROSYN) 500 MG tablet Take 1 tablet (500 mg) by mouth 2 times daily (with meals) 60 tablet 0     SUMAtriptan (IMITREX) 25 MG tablet Take 1-2 tablets (25-50 mg) by mouth at onset of headache for migraine May repeat in 2 hours. Max 8 tablets/24 hours. 9 tablet 1     vitamin B complex with vitamin C (VITAMIN  B COMPLEX) tablet Take 1 tablet by mouth daily       OTC products: None, except as noted above    Allergies   Allergen Reactions     Vicodin [Hydrocodone-Acetaminophen] Nausea and Vomiting      Latex Allergy: NO    Social History     Tobacco Use     Smoking status: Never Smoker     Smokeless tobacco: Never Used   Substance Use Topics     Alcohol use: Yes     Alcohol/week: 2.0 standard drinks     Types: 2 Standard drinks or equivalent per week     Comment: 2 drinks per week avg     History   Drug Use No       REVIEW OF SYSTEMS:   Constitutional, neuro, ENT, endocrine, pulmonary, cardiac, gastrointestinal, genitourinary, musculoskeletal, integument and psychiatric systems are negative, except as otherwise noted.    EXAM:   /84   Pulse 82   Temp 98.1  F (36.7  C) (Temporal)   Ht 1.651 m (5' 5\")   Wt 100.2 kg (220 lb 14.4 oz)   LMP 11/18/2019   SpO2 99%   Breastfeeding No   BMI 36.76 kg/m      GENERAL APPEARANCE: alert, no distress and over weight     EYES: EOMI, PERRL     HENT: nose and mouth without ulcers or lesions and normal cephalic/atraumatic     NECK: no adenopathy, no asymmetry, masses, or scars and thyroid normal to " palpation     RESP: lungs clear to auscultation - no rales, rhonchi or wheezes     CV: regular rates and rhythm, normal S1 S2, no S3 or S4 and no murmur, click or rub     ABDOMEN:  soft, nontender, no HSM or masses and bowel sounds normal     MS: extremities normal- no gross deformities noted, no evidence of inflammation in joints, FROM in all extremities.     SKIN: no suspicious lesions or rashes     NEURO: Normal strength and tone, sensory exam grossly normal, mentation intact and speech normal     PSYCH: mentation appears normal. and affect normal/bright     LYMPHATICS: No cervical adenopathy    DIAGNOSTICS:   No labs or EKG required for low risk surgery (cataract, skin procedure, breast biopsy, etc)  Labs below  Recent Labs   Lab Test 04/29/19  1752 11/28/18  0844  04/07/14  2245   HGB 13.3 13.7   < > 13.5    226   < > 227   NA  --  139  --  139   POTASSIUM  --  4.0  --  3.7   CR  --  0.78  --  0.73    < > = values in this interval not displayed.        IMPRESSION:   Reason for surgery/procedure: Impingement R shoulder  Diagnosis/reason for consult: obesity, migraines    The proposed surgical procedure is considered LOW risk.    REVISED CARDIAC RISK INDEX  The patient has the following serious cardiovascular risks for perioperative complications such as (MI, PE, VFib and 3  AV Block):  No serious cardiac risks  INTERPRETATION: 0 risks: Class I (very low risk - 0.4% complication rate)    The patient has the following additional risks for perioperative complications:  No identified additional risks      ICD-10-CM    1. Preop general physical exam Z01.818    2. Impingement syndrome, shoulder, right M75.41        RECOMMENDATIONS:     --Consult hospital rounder / IM to assist post-op medical management    --Patient is on no chronic medications    APPROVAL GIVEN to proceed with proposed procedure, without further diagnostic evaluation       Signed Electronically by: Padilla Young MD    Copy of this  evaluation report is provided to requesting physician.    [facility] Preop Guidelines    Revised Cardiac Risk Index

## 2020-02-03 NOTE — PROGRESS NOTES
Teresa is a 44 year old  female who presents for annual exam.     Besides routine health maintenance,  she would like to discuss Saturday vagina hurt so bad it felt bruised. Hurt to sit or walk. Nothing looked abnormal, by  symptoms were gone.    HPI:  The patient's PCP is  Anastasia Norton PA-C.    Had ablation last year. Still has bleeding but better.  Just had rotator cuff surgery.   No other GYN issues. PCP manages the rest.      GYNECOLOGIC HISTORY:    Patient's last menstrual period was 2020 (exact date).    Regular menses? yes  Menses every 21 days.  Length of menses: 2-5 days    Her current contraception method is: vasectomy.  She  reports that she has never smoked. She has never used smokeless tobacco.    Patient is sexually active.  STD testing offered?  Declined  Last PHQ-9 score on record =   PHQ-9 SCORE 2020   PHQ-9 Total Score 0     Last GAD7 score on record =   GARRY-7 SCORE 2020   Total Score 0     Alcohol Score = 1    HEALTH MAINTENANCE:  Cholesterol: Done through PCP  Cholesterol   Date Value Ref Range Status   10/23/2015 167 <200 mg/dL Final     Comment:     LDL Cholesterol is the primary guide to therapy.   The NCEP recommends further evaluation of: patients with cholesterol greater   than 200 mg/dL if additional risk factors are present, cholesterol greater   than   240 mg/dL, triglycerides greater than 150 mg/dL, or HDL less than 40 mg/dL.        Last Mammo: 2019, Result: Normal, Next Mammo: Today  2020  Pap: DUE  Lab Results   Component Value Date    PAP NIL, HPV - 2016     Colonoscopy  2017 [facility], Result: Normal, performed due to chronic diarrhea, Next Colonoscopy:    Dexa: NA    Health maintenance updated:  no    HISTORY:  OB History    Para Term  AB Living   1 0 0 0 0 0   SAB TAB Ectopic Multiple Live Births   0 0 0 0 0      # Outcome Date GA Lbr Saud/2nd Weight Sex Delivery Anes PTL Lv   1                  Patient Active Problem List   Diagnosis     CARDIOVASCULAR SCREENING; LDL GOAL LESS THAN 160     Menorrhagia     History of cervical dysplasia     Migraine with aura and without status migrainosus, not intractable     Non morbid obesity due to excess calories     Family history of Parkinson disease - Father early onset around age 30-40's     S/P ACL reconstruction     Epigastric pain- chronic intermittent since 4/2017. Extensive normal work-up: hepatic/celiac panels, lipase, TSH, CBC, CRP/ESR, EGD, colonoscopy, HIDA scan, CT abdomen.Per GI may be functional origin      Past Surgical History:   Procedure Laterality Date     ARTHROSCOPIC RECONSTRUCTION ANTERIOR CRUCIATE LIGAMENT Right 10/2010    Reconstruction (ACL, MCL) and fibular fx     CATARACT IOL, RT/LT      5/2017, 9/2017     COLONOSCOPY       COLONOSCOPY N/A 6/27/2017    Procedure: COLONOSCOPY;;  Surgeon: Allan Olivier MD;  Location: [facility]     COLPOSCOPY CERVIX, LOOP ELECTRODE BIOPSY, COMBINED  08/2011    squamous metaplasia     COMBINED DILATION AND CURETTAGE, ABLATE ENDOMETRIUM FELICIA N/A 5/2/2019    Procedure: FELICIA ABLATION;  Surgeon: Daniella Duncan MD;  Location:  OR     ESOPHAGOSCOPY, GASTROSCOPY, DUODENOSCOPY (EGD), COMBINED       ESOPHAGOSCOPY, GASTROSCOPY, DUODENOSCOPY (EGD), COMBINED N/A 6/27/2017    Procedure: COMBINED ESOPHAGOSCOPY, GASTROSCOPY, DUODENOSCOPY (EGD), BIOPSY SINGLE OR MULTIPLE;  ESOPHAGOSCOPY, GASTROSCOPY, DUODENOSCOPY (EGD with biopsies by cold forcep, Colonoscopy with biopsies by cold forcep.;  Surgeon: Allan Olivier MD;  Location:  GI     HC TOOTH EXTRACTION W/FORCEP  2000    wisdom teeth extraction     LAPAROSCOPY DIAGNOSTIC (GYN)  1992    negative (Aliabadi)     OPERATIVE HYSTEROSCOPY N/A 5/2/2019    Procedure: HYSTEROSCOPY;  Surgeon: Daniella Duncan MD;  Location:  OR     SHOULDER SURGERY  12/30/2019    Left shoulder      Social History     Tobacco Use     Smoking status: Never Smoker      Smokeless tobacco: Never Used   Substance Use Topics     Alcohol use: Yes     Alcohol/week: 2.0 standard drinks     Types: 2 Standard drinks or equivalent per week     Comment: 2 drinks per week avg      Problem (# of Occurrences) Relation (Name,Age of Onset)    Neurologic Disorder (2) Mother ( ): Fibromyalgia. migraines, Father ( ): parkinson with early dementia    No Known Problems (8) Maternal Grandmother, Maternal Grandfather, Paternal Grandmother, Paternal Grandfather, Sister, Sister, Brother, Other       Negative family history of: Colon Cancer, Thyroid Disease, Diabetes, Hypertension, Hyperlipidemia, Breast Cancer, Skin Cancer            Current Outpatient Medications   Medication Sig     acetaminophen (TYLENOL) 325 MG tablet Take 2 tablets (650 mg) by mouth every 4 hours as needed for mild pain     ALPRAZolam (XANAX) 0.25 MG tablet Take 30 minutes prior to daytime flight.     cyclobenzaprine (FLEXERIL) 5 MG tablet Take 5 mg by mouth as needed     ibuprofen (ADVIL/MOTRIN) 600 MG tablet Take 1 tablet (600 mg) by mouth every 6 hours as needed for other (mild and/or inflammatory pain)     LORazepam (ATIVAN) 0.5 MG tablet Take 0.5-1 tablets (0.25-0.5 mg) by mouth every 8 hours as needed for anxiety Take 30 minutes prior to departure.  Do not operate a vehicle after taking this medication     multivitamin w/minerals (MULTI-VITAMIN) tablet Take 1 tablet by mouth daily     naproxen (NAPROSYN) 500 MG tablet Take 1 tablet (500 mg) by mouth 2 times daily (with meals)     SUMAtriptan (IMITREX) 25 MG tablet Take 1-2 tablets (25-50 mg) by mouth at onset of headache for migraine May repeat in 2 hours. Max 8 tablets/24 hours.     vitamin B complex with vitamin C (VITAMIN  B COMPLEX) tablet Take 1 tablet by mouth daily     No current facility-administered medications for this visit.      Allergies   Allergen Reactions     Vicodin [Hydrocodone-Acetaminophen] Nausea and Vomiting       Past medical, surgical, social and family  "histories were reviewed and updated in EPIC.    ROS:   12 point review of systems negative other than symptoms noted below or in the HPI.      EXAM:  /60   Pulse 74   Ht 1.664 m (5' 5.5\")   Wt 97.2 kg (214 lb 3.2 oz)   LMP 01/12/2020 (Exact Date)   BMI 35.10 kg/m     BMI: Body mass index is 35.1 kg/m .    PHYSICAL EXAM:  Constitutional:   Appearance: Well nourished, well developed, alert, in no acute distress  Neck:  Lymph Nodes:  No lymphadenopathy present    Thyroid:  Gland size normal, nontender, no nodules or masses present  on palpation  Chest:  Respiratory Effort:  Breathing unlabored  Cardiovascular:    Heart: Auscultation:  Regular rate, normal rhythm, no murmurs present  Breasts: Inspection of Breasts:  No lymphadenopathy present., Palpation of Breasts and Axillae:  No masses present on palpation, no breast tenderness., Axillary Lymph Nodes:  No lymphadenopathy present. and No nodularity, asymmetry or nipple discharge bilaterally.  Gastrointestinal:   Abdominal Examination:  Abdomen nontender to palpation, tone normal without rigidity or guarding, no masses present, umbilicus without lesions   Liver and Spleen:  No hepatomegaly present, liver nontender to palpation    Hernias:  No hernias present  Lymphatic: Lymph Nodes:  No other lymphadenopathy present  Skin:  General Inspection:  No rashes present, no lesions present, no areas of  discoloration  Neurologic:    Mental Status:  Oriented X3.  Normal strength and tone, sensory exam                grossly normal, mentation intact and speech normal.    Psychiatric:   Mentation appears normal and affect normal/bright.         Pelvic Exam:  External Genitalia:     Normal appearance for age, no discharge present, no tenderness present, no inflammatory lesions present, color normal  Vagina:     Normal vaginal vault without central or paravaginal defects, no discharge present, no inflammatory lesions present, no masses present  Bladder:     Nontender to " palpation  Urethra:   Urethral Body:  Urethra palpation normal, urethra structural support normal   Urethral Meatus:  No erythema or lesions present  Cervix:     Appearance healthy, no lesions present, nontender to palpation, no bleeding present  Uterus:     Uterus: firm, normal sized and nontender, anteverted in position.   Adnexa:     No adnexal tenderness present, no adnexal masses present  Perineum:     Perineum within normal limits, no evidence of trauma, no rashes or skin lesions present  Anus:     Anus within normal limits, no hemorrhoids present  Inguinal Lymph Nodes:     No lymphadenopathy present  Pubic Hair:     Normal pubic hair distribution for age  Genitalia and Groin:     No rashes present, no lesions present, no areas of discoloration, no masses present      COUNSELING:   Reviewed preventive health counseling, as reflected in patient instructions       Regular exercise    BMI: Body mass index is 35.1 kg/m .  Weight management plan: Patient was referred to their PCP to discuss a diet and exercise plan.    ASSESSMENT:  44 year old female with satisfactory annual exam.    ICD-10-CM    1. Encounter for gynecological examination without abnormal finding Z01.419        PLAN:  Discussed ablations and expected outcomes. If unhappy down the road would have to consider LASH. Pt ok with watching for now.       Chu Stewart MD

## 2020-02-04 ENCOUNTER — OFFICE VISIT (OUTPATIENT)
Dept: OBGYN | Facility: CLINIC | Age: 45
End: 2020-02-04
Attending: OBSTETRICS & GYNECOLOGY
Payer: COMMERCIAL

## 2020-02-04 ENCOUNTER — ANCILLARY PROCEDURE (OUTPATIENT)
Dept: MAMMOGRAPHY | Facility: CLINIC | Age: 45
End: 2020-02-04
Attending: OBSTETRICS & GYNECOLOGY
Payer: COMMERCIAL

## 2020-02-04 VITALS
DIASTOLIC BLOOD PRESSURE: 60 MMHG | HEIGHT: 66 IN | BODY MASS INDEX: 34.42 KG/M2 | SYSTOLIC BLOOD PRESSURE: 120 MMHG | HEART RATE: 74 BPM | WEIGHT: 214.2 LBS

## 2020-02-04 DIAGNOSIS — Z12.31 VISIT FOR SCREENING MAMMOGRAM: ICD-10-CM

## 2020-02-04 DIAGNOSIS — Z01.419 ENCOUNTER FOR GYNECOLOGICAL EXAMINATION WITHOUT ABNORMAL FINDING: Primary | ICD-10-CM

## 2020-02-04 PROCEDURE — 77067 SCR MAMMO BI INCL CAD: CPT | Mod: TC

## 2020-02-04 PROCEDURE — 77063 BREAST TOMOSYNTHESIS BI: CPT | Mod: TC

## 2020-02-04 PROCEDURE — 99396 PREV VISIT EST AGE 40-64: CPT | Performed by: OBSTETRICS & GYNECOLOGY

## 2020-02-04 RX ORDER — CYCLOBENZAPRINE HCL 5 MG
5 TABLET ORAL PRN
COMMUNITY
Start: 2020-01-08 | End: 2021-02-15

## 2020-02-04 ASSESSMENT — ANXIETY QUESTIONNAIRES
7. FEELING AFRAID AS IF SOMETHING AWFUL MIGHT HAPPEN: NOT AT ALL
IF YOU CHECKED OFF ANY PROBLEMS ON THIS QUESTIONNAIRE, HOW DIFFICULT HAVE THESE PROBLEMS MADE IT FOR YOU TO DO YOUR WORK, TAKE CARE OF THINGS AT HOME, OR GET ALONG WITH OTHER PEOPLE: NOT DIFFICULT AT ALL
5. BEING SO RESTLESS THAT IT IS HARD TO SIT STILL: NOT AT ALL
1. FEELING NERVOUS, ANXIOUS, OR ON EDGE: NOT AT ALL
GAD7 TOTAL SCORE: 0
2. NOT BEING ABLE TO STOP OR CONTROL WORRYING: NOT AT ALL
6. BECOMING EASILY ANNOYED OR IRRITABLE: NOT AT ALL
3. WORRYING TOO MUCH ABOUT DIFFERENT THINGS: NOT AT ALL

## 2020-02-04 ASSESSMENT — PATIENT HEALTH QUESTIONNAIRE - PHQ9
5. POOR APPETITE OR OVEREATING: NOT AT ALL
SUM OF ALL RESPONSES TO PHQ QUESTIONS 1-9: 0

## 2020-02-04 ASSESSMENT — MIFFLIN-ST. JEOR: SCORE: 1630.41

## 2020-02-05 ASSESSMENT — ANXIETY QUESTIONNAIRES: GAD7 TOTAL SCORE: 0

## 2021-01-14 ENCOUNTER — HEALTH MAINTENANCE LETTER (OUTPATIENT)
Age: 46
End: 2021-01-14

## 2021-02-12 NOTE — PROGRESS NOTES
Teresa is a 45 year old  female who presents for annual exam.     Besides routine health maintenance, she has no other health concerns today .    HPI:  The patient's PCP is  Anastasia Norton PA-C.  Nichole presents for an annual exam and would also like labs. Reports heavy menses every 21 days. Had the Jennifer endometrial ablation in  with Dr. Duncan and states that she has not had an improvement in her menses. She saw Dr. Stewart last year and discussed possibly having a hysterectomy and is still considering it. Menopause occurs at the average age of 60 with her family members. She also had a LEEP in  for persistent cervical dysplsaia with Dr. Duncan as well. Her pap smears have been normal in the following 10 years.      GYNECOLOGIC HISTORY:    Patient's last menstrual period was 2021.    Regular menses? yes  Menses every 28-30 days.  Length of menses: 4 days    Her current contraception method is: vasectomy.  She  reports that she has never smoked. She has never used smokeless tobacco.    Patient is sexually active.  Last PHQ-9 score on record =   PHQ-9 SCORE 2/15/2021   PHQ-9 Total Score 0     Last GAD7 score on record =   GARRY-7 SCORE 2/15/2021   Total Score 0     Alcohol Score = 2    HEALTH MAINTENANCE:    Cholesterol:   Recent Labs   Lab Test 10/23/15  0748   CHOL 167   HDL 52   LDL 93   TRIG 108   CHOLHDLRATIO 3.2       Last Mammo: 2020, Result: Normal, Next Mammo: Today   Pap:   Lab Results   Component Value Date    PAP NIL 2016      Colonoscopy:  2019, Result: Normal, Next Colonoscopy: 8 years.  Dexa:  None    Health maintenance updated:  no    HISTORY:  OB History    Para Term  AB Living   1 0 0 0 0 0   SAB TAB Ectopic Multiple Live Births   0 0 0 0 0      # Outcome Date GA Lbr Saud/2nd Weight Sex Delivery Anes PTL Lv   1                 Patient Active Problem List   Diagnosis     CARDIOVASCULAR SCREENING; LDL GOAL LESS THAN 160     Menorrhagia      History of cervical dysplasia     Migraine with aura and without status migrainosus, not intractable     Non morbid obesity due to excess calories     Family history of Parkinson disease - Father early onset around age 30-40's     S/P ACL reconstruction     Epigastric pain- chronic intermittent since 4/2017. Extensive normal work-up: hepatic/celiac panels, lipase, TSH, CBC, CRP/ESR, EGD, colonoscopy, HIDA scan, CT abdomen.Per GI may be functional origin      Past Surgical History:   Procedure Laterality Date     ARTHROSCOPIC RECONSTRUCTION ANTERIOR CRUCIATE LIGAMENT Right 10/2010    Reconstruction (ACL, MCL) and fibular fx     CATARACT IOL, RT/LT      5/2017, 9/2017     COLONOSCOPY       COLONOSCOPY N/A 6/27/2017    Procedure: COLONOSCOPY;;  Surgeon: Allan Olivier MD;  Location:  GI     COLPOSCOPY CERVIX, LOOP ELECTRODE BIOPSY, COMBINED  08/2011    squamous metaplasia     COMBINED DILATION AND CURETTAGE, ABLATE ENDOMETRIUM FELICIA N/A 5/2/2019    Procedure: FELICIA ABLATION;  Surgeon: Daniella Duncan MD;  Location:  OR     ESOPHAGOSCOPY, GASTROSCOPY, DUODENOSCOPY (EGD), COMBINED       ESOPHAGOSCOPY, GASTROSCOPY, DUODENOSCOPY (EGD), COMBINED N/A 6/27/2017    Procedure: COMBINED ESOPHAGOSCOPY, GASTROSCOPY, DUODENOSCOPY (EGD), BIOPSY SINGLE OR MULTIPLE;  ESOPHAGOSCOPY, GASTROSCOPY, DUODENOSCOPY (EGD with biopsies by cold forcep, Colonoscopy with biopsies by cold forcep.;  Surgeon: Allan Olivier MD;  Location:  GI     HC TOOTH EXTRACTION W/FORCEP  2000    wisdom teeth extraction     LAPAROSCOPY DIAGNOSTIC (GYN)  1992    negative (Aliabadi)     OPERATIVE HYSTEROSCOPY N/A 5/2/2019    Procedure: HYSTEROSCOPY;  Surgeon: Danielal Duncan MD;  Location:  OR     SHOULDER SURGERY  12/30/2019    Left shoulder      Social History     Tobacco Use     Smoking status: Never Smoker     Smokeless tobacco: Never Used   Substance Use Topics     Alcohol use: Yes     Alcohol/week: 2.0 standard drinks     Types:  "2 Standard drinks or equivalent per week     Comment: 2 drinks per week avg      Problem (# of Occurrences) Relation (Name,Age of Onset)    Neurologic Disorder (2) Mother ( ): Fibromyalgia. migraines, Father ( ): parkinson with early dementia    No Known Problems (8) Maternal Grandmother, Maternal Grandfather, Paternal Grandmother, Paternal Grandfather, Sister, Sister, Brother, Other       Negative family history of: Colon Cancer, Thyroid Disease, Diabetes, Hypertension, Hyperlipidemia, Breast Cancer, Skin Cancer            Current Outpatient Medications   Medication Sig     multivitamin w/minerals (MULTI-VITAMIN) tablet Take 1 tablet by mouth daily     vitamin B complex with vitamin C (VITAMIN  B COMPLEX) tablet Take 1 tablet by mouth daily     No current facility-administered medications for this visit.      Allergies   Allergen Reactions     Vicodin [Hydrocodone-Acetaminophen] Nausea and Vomiting       Past medical, surgical, social and family histories were reviewed and updated in EPIC.    ROS:   12 point review of systems negative other than symptoms noted below or in the HPI.  No urinary frequency or dysuria, bladder or kidney problems    EXAM:  /72   Pulse 70   Ht 1.632 m (5' 4.25\")   Wt 93.3 kg (205 lb 9.6 oz)   LMP 02/04/2021   Breastfeeding No   BMI 35.02 kg/m     BMI: Body mass index is 35.02 kg/m .    PHYSICAL EXAM:  Constitutional:   Appearance: Well nourished, well developed, alert, in no acute distress  Neck:  Lymph Nodes:  No lymphadenopathy present    Thyroid:  Gland size normal, nontender, no nodules or masses present  on palpation  Chest:  Respiratory Effort:  Breathing unlabored, CTAB  Cardiovascular:    Heart: Auscultation:  Regular rate, normal rhythm, no murmurs present  Breasts: Inspection of Breasts:  No lymphadenopathy present., Palpation of Breasts and Axillae:  No masses present on palpation, no breast tenderness., Axillary Lymph Nodes:  No lymphadenopathy present. and No " nodularity, asymmetry or nipple discharge bilaterally.  Gastrointestinal:   Abdominal Examination:  Abdomen nontender to palpation, tone normal without rigidity or guarding, no masses present, umbilicus without lesions   Liver and Spleen:  No hepatomegaly present, liver nontender to palpation    Hernias:  No hernias present  Lymphatic: Lymph Nodes:  No other lymphadenopathy present  Skin:  General Inspection:  No rashes present, no lesions present, no areas of  discoloration  Neurologic:    Mental Status:  Oriented X3.  Normal strength and tone, sensory exam                grossly normal, mentation intact and speech normal.    Psychiatric:   Mentation appears normal and affect normal/bright.         Pelvic Exam:  External Genitalia:     Normal appearance for age, no discharge present, no tenderness present, no inflammatory lesions present, color normal  Vagina:     Normal vaginal vault without central or paravaginal defects, no discharge present, no inflammatory lesions present, no masses present  Bladder:     Nontender to palpation  Urethra:   Urethral Body:  Urethra palpation normal, urethra structural support normal   Urethral Meatus:  No erythema or lesions present  Cervix:     Appearance healthy, no lesions present, nontender to palpation, no bleeding present  Uterus:     Uterus: firm, normal sized and nontender, anteverted in position.   Adnexa:     No adnexal tenderness present, no adnexal masses present  Perineum:     Perineum within normal limits, no evidence of trauma, no rashes or skin lesions present  Genitalia and Groin:     No rashes present, no lesions present, no areas of discoloration, no masses present      COUNSELING:   Reviewed preventive health counseling, as reflected in patient instructions    BMI: Body mass index is 35.02 kg/m .      ASSESSMENT:  45 year old female with satisfactory annual exam.    ICD-10-CM    1. Encounter for gynecological examination without abnormal finding  Z01.419 Pap  imaged thin layer screen with HPV - recommended age 30 - 65     HPV High Risk Types DNA Cervical   2. Screening for metabolic disorder  Z13.228 Comprehensive metabolic panel   3. Screening for cardiovascular condition  Z13.6 Lipid panel reflex to direct LDL Fasting   4. Screening for thyroid disorder  Z13.29 TSH with free T4 reflex   5. Menorrhagia with regular cycle  N92.0 CBC with platelets     Ferritin       PLAN:  Discussed definitive management of abnormal uterine bleeding. The pros and cons of a supracervical hysterectomy versus a total hysterectomy were discussed today. She will think about her options and likely schedule a telephone consult with Dr. Duncan in the future.  Screening labs today. Will add on CBC and ferritin due to heavy menstrual periods.    Susan Fierro MD

## 2021-02-15 ENCOUNTER — OFFICE VISIT (OUTPATIENT)
Dept: OBGYN | Facility: CLINIC | Age: 46
End: 2021-02-15
Payer: COMMERCIAL

## 2021-02-15 ENCOUNTER — ANCILLARY PROCEDURE (OUTPATIENT)
Dept: MAMMOGRAPHY | Facility: CLINIC | Age: 46
End: 2021-02-15
Payer: COMMERCIAL

## 2021-02-15 VITALS
WEIGHT: 205.6 LBS | SYSTOLIC BLOOD PRESSURE: 112 MMHG | DIASTOLIC BLOOD PRESSURE: 72 MMHG | BODY MASS INDEX: 35.1 KG/M2 | HEIGHT: 64 IN | HEART RATE: 70 BPM

## 2021-02-15 DIAGNOSIS — Z13.228 SCREENING FOR METABOLIC DISORDER: ICD-10-CM

## 2021-02-15 DIAGNOSIS — Z13.6 SCREENING FOR CARDIOVASCULAR CONDITION: ICD-10-CM

## 2021-02-15 DIAGNOSIS — Z12.31 VISIT FOR SCREENING MAMMOGRAM: ICD-10-CM

## 2021-02-15 DIAGNOSIS — Z13.29 SCREENING FOR THYROID DISORDER: ICD-10-CM

## 2021-02-15 DIAGNOSIS — Z01.419 ENCOUNTER FOR GYNECOLOGICAL EXAMINATION WITHOUT ABNORMAL FINDING: Primary | ICD-10-CM

## 2021-02-15 DIAGNOSIS — N92.0 MENORRHAGIA WITH REGULAR CYCLE: ICD-10-CM

## 2021-02-15 LAB
ERYTHROCYTE [DISTWIDTH] IN BLOOD BY AUTOMATED COUNT: 11.9 % (ref 10–15)
HCT VFR BLD AUTO: 40.8 % (ref 35–47)
HGB BLD-MCNC: 13.4 G/DL (ref 11.7–15.7)
MCH RBC QN AUTO: 30.6 PG (ref 26.5–33)
MCHC RBC AUTO-ENTMCNC: 32.8 G/DL (ref 31.5–36.5)
MCV RBC AUTO: 93 FL (ref 78–100)
PLATELET # BLD AUTO: 227 10E9/L (ref 150–450)
RBC # BLD AUTO: 4.38 10E12/L (ref 3.8–5.2)
WBC # BLD AUTO: 6.7 10E9/L (ref 4–11)

## 2021-02-15 PROCEDURE — 36415 COLL VENOUS BLD VENIPUNCTURE: CPT | Performed by: OBSTETRICS & GYNECOLOGY

## 2021-02-15 PROCEDURE — 82728 ASSAY OF FERRITIN: CPT | Performed by: OBSTETRICS & GYNECOLOGY

## 2021-02-15 PROCEDURE — G0145 SCR C/V CYTO,THINLAYER,RESCR: HCPCS | Performed by: OBSTETRICS & GYNECOLOGY

## 2021-02-15 PROCEDURE — 99396 PREV VISIT EST AGE 40-64: CPT | Performed by: OBSTETRICS & GYNECOLOGY

## 2021-02-15 PROCEDURE — 80061 LIPID PANEL: CPT | Performed by: OBSTETRICS & GYNECOLOGY

## 2021-02-15 PROCEDURE — 80053 COMPREHEN METABOLIC PANEL: CPT | Performed by: OBSTETRICS & GYNECOLOGY

## 2021-02-15 PROCEDURE — 85027 COMPLETE CBC AUTOMATED: CPT | Performed by: OBSTETRICS & GYNECOLOGY

## 2021-02-15 PROCEDURE — 87624 HPV HI-RISK TYP POOLED RSLT: CPT | Performed by: OBSTETRICS & GYNECOLOGY

## 2021-02-15 PROCEDURE — 77067 SCR MAMMO BI INCL CAD: CPT | Mod: TC | Performed by: RADIOLOGY

## 2021-02-15 PROCEDURE — 77063 BREAST TOMOSYNTHESIS BI: CPT | Mod: TC | Performed by: RADIOLOGY

## 2021-02-15 PROCEDURE — 84443 ASSAY THYROID STIM HORMONE: CPT | Performed by: OBSTETRICS & GYNECOLOGY

## 2021-02-15 ASSESSMENT — ANXIETY QUESTIONNAIRES
1. FEELING NERVOUS, ANXIOUS, OR ON EDGE: NOT AT ALL
GAD7 TOTAL SCORE: 0
7. FEELING AFRAID AS IF SOMETHING AWFUL MIGHT HAPPEN: NOT AT ALL
2. NOT BEING ABLE TO STOP OR CONTROL WORRYING: NOT AT ALL
5. BEING SO RESTLESS THAT IT IS HARD TO SIT STILL: NOT AT ALL
IF YOU CHECKED OFF ANY PROBLEMS ON THIS QUESTIONNAIRE, HOW DIFFICULT HAVE THESE PROBLEMS MADE IT FOR YOU TO DO YOUR WORK, TAKE CARE OF THINGS AT HOME, OR GET ALONG WITH OTHER PEOPLE: NOT DIFFICULT AT ALL
6. BECOMING EASILY ANNOYED OR IRRITABLE: NOT AT ALL
3. WORRYING TOO MUCH ABOUT DIFFERENT THINGS: NOT AT ALL

## 2021-02-15 ASSESSMENT — PATIENT HEALTH QUESTIONNAIRE - PHQ9
5. POOR APPETITE OR OVEREATING: NOT AT ALL
SUM OF ALL RESPONSES TO PHQ QUESTIONS 1-9: 0

## 2021-02-15 ASSESSMENT — MIFFLIN-ST. JEOR: SCORE: 1566.57

## 2021-02-16 LAB
ALBUMIN SERPL-MCNC: 3.7 G/DL (ref 3.4–5)
ALP SERPL-CCNC: 59 U/L (ref 40–150)
ALT SERPL W P-5'-P-CCNC: 18 U/L (ref 0–50)
ANION GAP SERPL CALCULATED.3IONS-SCNC: 2 MMOL/L (ref 3–14)
AST SERPL W P-5'-P-CCNC: 10 U/L (ref 0–45)
BILIRUB SERPL-MCNC: 0.3 MG/DL (ref 0.2–1.3)
BUN SERPL-MCNC: 12 MG/DL (ref 7–30)
CALCIUM SERPL-MCNC: 9 MG/DL (ref 8.5–10.1)
CHLORIDE SERPL-SCNC: 107 MMOL/L (ref 94–109)
CHOLEST SERPL-MCNC: 191 MG/DL
CO2 SERPL-SCNC: 28 MMOL/L (ref 20–32)
CREAT SERPL-MCNC: 0.72 MG/DL (ref 0.52–1.04)
FERRITIN SERPL-MCNC: 78 NG/ML (ref 8–252)
GFR SERPL CREATININE-BSD FRML MDRD: >90 ML/MIN/{1.73_M2}
GLUCOSE SERPL-MCNC: 79 MG/DL (ref 70–99)
HDLC SERPL-MCNC: 70 MG/DL
LDLC SERPL CALC-MCNC: 95 MG/DL
NONHDLC SERPL-MCNC: 121 MG/DL
POTASSIUM SERPL-SCNC: 4 MMOL/L (ref 3.4–5.3)
PROT SERPL-MCNC: 6.7 G/DL (ref 6.8–8.8)
SODIUM SERPL-SCNC: 137 MMOL/L (ref 133–144)
TRIGL SERPL-MCNC: 128 MG/DL
TSH SERPL DL<=0.005 MIU/L-ACNC: 1.83 MU/L (ref 0.4–4)

## 2021-02-16 ASSESSMENT — ANXIETY QUESTIONNAIRES: GAD7 TOTAL SCORE: 0

## 2021-02-17 LAB
COPATH REPORT: NORMAL
PAP: NORMAL

## 2021-02-19 LAB
FINAL DIAGNOSIS: NORMAL
HPV HR 12 DNA CVX QL NAA+PROBE: NEGATIVE
HPV16 DNA SPEC QL NAA+PROBE: NEGATIVE
HPV18 DNA SPEC QL NAA+PROBE: NEGATIVE
SPECIMEN DESCRIPTION: NORMAL
SPECIMEN SOURCE CVX/VAG CYTO: NORMAL

## 2021-05-04 ENCOUNTER — E-VISIT (OUTPATIENT)
Dept: INTERNAL MEDICINE | Facility: CLINIC | Age: 46
End: 2021-05-04
Payer: COMMERCIAL

## 2021-05-04 DIAGNOSIS — G89.29 CHRONIC LOW BACK PAIN, UNSPECIFIED BACK PAIN LATERALITY, UNSPECIFIED WHETHER SCIATICA PRESENT: Primary | ICD-10-CM

## 2021-05-04 DIAGNOSIS — M54.50 CHRONIC LOW BACK PAIN, UNSPECIFIED BACK PAIN LATERALITY, UNSPECIFIED WHETHER SCIATICA PRESENT: Primary | ICD-10-CM

## 2021-05-04 PROCEDURE — 99421 OL DIG E/M SVC 5-10 MIN: CPT | Performed by: INTERNAL MEDICINE

## 2021-05-04 NOTE — PATIENT INSTRUCTIONS
Caring for Your Back    You are not alone.    Low back pain is very common. Nearly half of all adults will have low back pain in any given year. The good news is that back pain is rarely a danger to your health. Most people can manage their back pain on their own. About half of people start feeling better within two weeks. In 9 out of 10 cases, low back pain goes away or no longer limits daily activity within 6 weeks.     Your outlook is good!     Your symptoms tell us that your low back pain is most likely not a danger to you. Most of the time we will not know the exact cause of low back pain, even if you see a doctor or have an MRI. However, treatment can still work without knowing the cause of the pain. Less than 1 in 100 people need surgery for their back pain.     What can I do about my low back pain?     There are three basic things you can do to ease low back pain and help it go away.     Use heat or cold packs.    Take medicine as directed.    Use positions, movements and exercises.    Using heat or cold packs:    Try cold packs or gentle heat to ease your pain.  Use whichever gives you the most relief. Apply the cold pack or heat for 15 minutes at a time, as often as needed.    Taking medicine:    If taking over-the-counter medicine:    Take ibuprofen (Advil, Motrin) 600 mg three times a day as needed for pain.  OR    Take Aleve (naproxen) 220 to 440 mg two times a day as needed for pain. If your doctor prescribed a muscle relaxant (cyclobenzaprine 10 mg.):    Take   to 1 tablet at bedtime.    Do not drive when taking this medicine. This drug may make you sleepy.     Using positions, movements and exercises:    Research tells us that moving your joints and muscles can help you recover from back pain. Such activity should be simple and gentle. Use the positions below as well as walking to help relieve your pain. Try taking a short walk every 3 to 4 hours during the day. Walk for a few minutes inside your  home or take longer walks outside, on a treadmill or at a mall. Slowly increase the amount of time you walk. Expect discomfort when you begin, but it should lessen as your back starts to heal. When your back feels better, walk daily to keep your back and body healthy.    Finding a position that is comfortable:    When your back pain is new, certain positions will ease your pain. Gently try each of the positions below until you find one that is helpful. Once you find a position of comfort, use it as often as you like when you are resting. You will recover faster if you combine rest with activity.    * Lie on your back with your legs bent. You can do this by placing a pillow under your knees or lie on the floor and rest your lower legs on the seat of a chair.  * Lie on your side with your knees bent and place a pillow between your knees.    Lie on your stomach over pillows.       When should I call my doctor?    Your back pain should improve over the first couple of weeks. As it improves, you should be able to return to your normal activities.  But call your doctor if:      You have a sudden change in your ability to control your bladder or bowels.    You begin to feel tingling in your groin or legs.    The pain spreads down your leg and into your foot.    Your toes, feet or leg muscles begin to feel weak.    You feel generally unwell or sick.    Your pain gets worse.    If you are deaf or hard of hearing, please let us know. We provide many free services including sign language interpreters, oral interpreters, TTYs, telephone amplifiers, note takers and written materials.    For informational purposes only. Not to replace the advice of your health care provider. Copyright   2013 [facility]. All rights reserved. Guide 725542 - 04/13.

## 2021-06-14 ENCOUNTER — OFFICE VISIT (OUTPATIENT)
Dept: URGENT CARE | Facility: URGENT CARE | Age: 46
End: 2021-06-14
Payer: COMMERCIAL

## 2021-06-14 VITALS
OXYGEN SATURATION: 100 % | BODY MASS INDEX: 36.16 KG/M2 | TEMPERATURE: 98.7 F | RESPIRATION RATE: 16 BRPM | HEART RATE: 82 BPM | DIASTOLIC BLOOD PRESSURE: 78 MMHG | WEIGHT: 212.3 LBS | SYSTOLIC BLOOD PRESSURE: 129 MMHG

## 2021-06-14 DIAGNOSIS — R51.9 NONINTRACTABLE HEADACHE, UNSPECIFIED CHRONICITY PATTERN, UNSPECIFIED HEADACHE TYPE: Primary | ICD-10-CM

## 2021-06-14 LAB
BASOPHILS # BLD AUTO: 0 10E9/L (ref 0–0.2)
BASOPHILS NFR BLD AUTO: 0.3 %
DIFFERENTIAL METHOD BLD: NORMAL
EOSINOPHIL # BLD AUTO: 0.5 10E9/L (ref 0–0.7)
EOSINOPHIL NFR BLD AUTO: 6 %
ERYTHROCYTE [DISTWIDTH] IN BLOOD BY AUTOMATED COUNT: 12 % (ref 10–15)
ERYTHROCYTE [SEDIMENTATION RATE] IN BLOOD BY WESTERGREN METHOD: 10 MM/H (ref 0–20)
HCT VFR BLD AUTO: 39.6 % (ref 35–47)
HGB BLD-MCNC: 13 G/DL (ref 11.7–15.7)
LYMPHOCYTES # BLD AUTO: 3 10E9/L (ref 0.8–5.3)
LYMPHOCYTES NFR BLD AUTO: 34 %
MCH RBC QN AUTO: 30.8 PG (ref 26.5–33)
MCHC RBC AUTO-ENTMCNC: 32.8 G/DL (ref 31.5–36.5)
MCV RBC AUTO: 94 FL (ref 78–100)
MONOCYTES # BLD AUTO: 0.7 10E9/L (ref 0–1.3)
MONOCYTES NFR BLD AUTO: 7.5 %
NEUTROPHILS # BLD AUTO: 4.5 10E9/L (ref 1.6–8.3)
NEUTROPHILS NFR BLD AUTO: 52.2 %
PLATELET # BLD AUTO: 195 10E9/L (ref 150–450)
RBC # BLD AUTO: 4.22 10E12/L (ref 3.8–5.2)
WBC # BLD AUTO: 8.7 10E9/L (ref 4–11)

## 2021-06-14 PROCEDURE — 36415 COLL VENOUS BLD VENIPUNCTURE: CPT | Performed by: FAMILY MEDICINE

## 2021-06-14 PROCEDURE — 99214 OFFICE O/P EST MOD 30 MIN: CPT | Performed by: FAMILY MEDICINE

## 2021-06-14 PROCEDURE — 85025 COMPLETE CBC W/AUTO DIFF WBC: CPT | Performed by: FAMILY MEDICINE

## 2021-06-14 PROCEDURE — 85652 RBC SED RATE AUTOMATED: CPT | Performed by: FAMILY MEDICINE

## 2021-06-14 NOTE — PROGRESS NOTES
Assessment & Plan     Nonintractable headache, unspecified chronicity pattern, unspecified headache type  - ESR: Erythrocyte sedimentation rate  - CBC with platelets and differential    Lab work suggests against temporal arteritis and no present exam findings to suggest this is present    Pattern of discomfort is not typical for trigeminal neuralgia  Normal neuro exam.     Recommended symptomatic measures with close follow-up for worsening symptoms.     Low back discomfort  Towards end of visit she briefly discusses that she has mad many months of low back discomfort with periods of feeling shifting along a joint (SI or lumar spine?) -- recommended appt with sports med team for further assessment. No reported leg weakness/numbness or bowel/bladder dysfx.     Gerard Segura MD   [facility]    Subjective     Nichole is a 45 year old female who presents to clinic today for the following health issues:  Chief Complaint   Patient presents with     Headache     46 yo F presents with the following pressure on right temple onset saturday occurred when patient was eating  no sinus pressure  tx- OTC no relief        HPI    Symptoms  Started 2 days ago, this feels different from her usual headaches    Appetite has also felt altered.   The discomfort was aggravated when chewing.     Denies new visual difficulties. Although she reports hx of cataracts years ago and realizes she is due for an examination.   Ibuprofen/tylenol attempted    New headache now present on the back of her head  NOt sensitive to the touch at this present time. The area was throbbing last night.     NO sensitivity to light/sound.   Does not recall  Hx of TMJ -- uses  at time.     Doesn't feel like previous presentation of shingles (experienced once on the back)     Patient Active Problem List    Diagnosis Date Noted     Epigastric pain- chronic intermittent since 4/2017. Extensive normal work-up: hepatic/celiac panels, lipase, TSH,  CBC, CRP/ESR, EGD, colonoscopy, HIDA scan, CT abdomen.Per GI may be functional origin  04/29/2019     Priority: Medium     Pt offered trial of nortriptyline by GI, but she declines.  Was an element of constipation - used miralax for 1 month.        Family history of Parkinson disease - Father early onset around age 30-40's 11/28/2018     Priority: Medium     Non morbid obesity due to excess calories 09/07/2017     Priority: Medium     Migraine with aura and without status migrainosus, not intractable 04/27/2017     Priority: Medium     History of cervical dysplasia      Priority: Medium     (2009) JOANNA I  ([facility])  (2010) LGSIL, ASC-US, +HRHPV;  colpo -->JOANNA I and focal JOANNA II  (2011) LGSIL;  LEEP-->sq metaplasia  (2012) neg pap, +HRHPV  (2013) neg pap, neg HPV  (2016) neg pap, neg HPV;  Resume routine screening       Menorrhagia 10/06/2015     Priority: Medium     S/P ACL reconstruction 11/19/2010     Priority: Medium     CARDIOVASCULAR SCREENING; LDL GOAL LESS THAN 160 10/31/2010     Priority: Medium     H/O LEEP 2010     Priority: Medium     2010 COLP- JOANNA 1 and JOANNA 2  2011 LEEP - negative for dysplasia  2013 NIL pap, Neg HPV  2016 NIL pap, neg HPV  2/15/21 NIL pap, Neg HPV.Plan cotest in 3 years.            Current Outpatient Medications   Medication     multivitamin w/minerals (MULTI-VITAMIN) tablet     vitamin B complex with vitamin C (VITAMIN  B COMPLEX) tablet     No current facility-administered medications for this visit.            Objective    /78   Pulse 82   Temp 98.7  F (37.1  C) (Tympanic)   Resp 16   Wt 96.3 kg (212 lb 4.8 oz)   SpO2 100%   BMI 36.16 kg/m    Physical Exam     Ears: no canal lesions  Scalp: no skin redness/swelling or lesions  Eyes :PERRL  Neuro: no ataxia, clear speech    Results for orders placed or performed in visit on 06/14/21   ESR: Erythrocyte sedimentation rate     Status: None   Result Value Ref Range    Sed Rate 10 0 - 20 mm/h   CBC with platelets and  differential     Status: None   Result Value Ref Range    WBC 8.7 4.0 - 11.0 10e9/L    RBC Count 4.22 3.8 - 5.2 10e12/L    Hemoglobin 13.0 11.7 - 15.7 g/dL    Hematocrit 39.6 35.0 - 47.0 %    MCV 94 78 - 100 fl    MCH 30.8 26.5 - 33.0 pg    MCHC 32.8 31.5 - 36.5 g/dL    RDW 12.0 10.0 - 15.0 %    Platelet Count 195 150 - 450 10e9/L    Diff Method Automated Method     % Neutrophils 52.2 %    % Lymphocytes 34.0 %    % Monocytes 7.5 %    % Eosinophils 6.0 %    % Basophils 0.3 %    Absolute Neutrophil 4.5 1.6 - 8.3 10e9/L    Absolute Lymphocytes 3.0 0.8 - 5.3 10e9/L    Absolute Monocytes 0.7 0.0 - 1.3 10e9/L    Absolute Eosinophils 0.5 0.0 - 0.7 10e9/L    Absolute Basophils 0.0 0.0 - 0.2 10e9/L               The use of Dragon/QPDation services may have been used to construct the content in this note; any grammatical or spelling errors are non-intentional. Please contact the author of this note directly if you are in need of any clarification.

## 2021-06-14 NOTE — PATIENT INSTRUCTIONS
Continue to monitor if symptoms worsen or new symptoms develop return to be seen      Can try heat or ice to the area if this is helpful          Call [phone] to schedule an appointment with the Sports medicine specialists to discuss your R lower back discomfort

## 2021-06-17 ENCOUNTER — OFFICE VISIT (OUTPATIENT)
Dept: FAMILY MEDICINE | Facility: CLINIC | Age: 46
End: 2021-06-17
Payer: COMMERCIAL

## 2021-06-17 ENCOUNTER — ANCILLARY PROCEDURE (OUTPATIENT)
Dept: GENERAL RADIOLOGY | Facility: CLINIC | Age: 46
End: 2021-06-17
Attending: PHYSICIAN ASSISTANT
Payer: COMMERCIAL

## 2021-06-17 VITALS
HEIGHT: 64 IN | BODY MASS INDEX: 36.19 KG/M2 | SYSTOLIC BLOOD PRESSURE: 120 MMHG | HEART RATE: 85 BPM | WEIGHT: 212 LBS | TEMPERATURE: 98.2 F | OXYGEN SATURATION: 99 % | RESPIRATION RATE: 14 BRPM | DIASTOLIC BLOOD PRESSURE: 80 MMHG

## 2021-06-17 DIAGNOSIS — M54.50 CHRONIC MIDLINE LOW BACK PAIN WITHOUT SCIATICA: Primary | ICD-10-CM

## 2021-06-17 DIAGNOSIS — G89.29 CHRONIC RIGHT-SIDED LOW BACK PAIN WITHOUT SCIATICA: ICD-10-CM

## 2021-06-17 DIAGNOSIS — M54.50 CHRONIC RIGHT-SIDED LOW BACK PAIN WITHOUT SCIATICA: ICD-10-CM

## 2021-06-17 DIAGNOSIS — G89.29 CHRONIC MIDLINE LOW BACK PAIN WITHOUT SCIATICA: Primary | ICD-10-CM

## 2021-06-17 DIAGNOSIS — R51.9 RIGHT TEMPORAL HEADACHE: ICD-10-CM

## 2021-06-17 DIAGNOSIS — B35.1 ONYCHOMYCOSIS: ICD-10-CM

## 2021-06-17 PROCEDURE — 99214 OFFICE O/P EST MOD 30 MIN: CPT | Performed by: PHYSICIAN ASSISTANT

## 2021-06-17 PROCEDURE — 72100 X-RAY EXAM L-S SPINE 2/3 VWS: CPT | Mod: FY | Performed by: RADIOLOGY

## 2021-06-17 RX ORDER — IBUPROFEN 200 MG
200-400 TABLET ORAL EVERY 6 HOURS PRN
COMMUNITY

## 2021-06-17 RX ORDER — ACETAMINOPHEN 500 MG
500-1000 TABLET ORAL EVERY 6 HOURS PRN
COMMUNITY

## 2021-06-17 RX ORDER — METHYLPREDNISOLONE 4 MG
TABLET, DOSE PACK ORAL
Qty: 21 TABLET | Refills: 0 | Status: SHIPPED | OUTPATIENT
Start: 2021-06-17 | End: 2021-08-05

## 2021-06-17 ASSESSMENT — MIFFLIN-ST. JEOR: SCORE: 1595.6

## 2021-06-17 NOTE — PROGRESS NOTES
Assessment & Plan     Chronic midline low back pain without sciatica  Chronic right-sided low back pain without sciatica  Worsening x1 yr, but daily pain over the past 6 months. X-ray showing underlying lumbar DDD, but no red flags on exam. ? Mildly positive SLR inciting pain, but no radicular symptoms. No improvement with conservative measures so far. Will trial medrol-dosepak and refer for PT. If no improvement, consider lumbar MRI vs spine referral.  - XR Lumbar Spine 2/3 Views  - methylPREDNISolone (MEDROL DOSEPAK) 4 MG tablet therapy pack; Follow Package Directions  - PHYSICAL THERAPY REFERRAL; Future    Right temporal headache  Agree with prior UC assessment that hx most c/w TMJ origin. Previously normal inflammatory marker and no hx of monocular vision loss. Encouraged to follow-up with dental crown repair when able so can get fitted for new . Can always consider referral to TMJ clinic in future if chronic issues or concerns.    Onychomycosis  Probable fungal nail infection. Refer to podiatry for confirmation and definitive treatment.  - Orthopedic  Referral; Future    Return in about 1 week (around 6/24/2021) for physical therapy.    Anastasia Norton PA-C  [facility]   Nichole is a 45 year old who presents for the following health issues:    [facility]     ED/ Followup:    Facility:  [facility]  Date of visit: 06/14/2021  Reason for visit: headache/ backache -   HA started 5 days ago - sudden onset that began with chewing - stabbing pain 10/10 then down to 8/10 if palpated R temporal region then decrescendoed down to 5/10 2 days later. Was originally pulsing/pressure type pain. Different type of pain than she's had with migraine. Thus, seen at  3 days ago and had negative CBC and ESR. Felt more to be c/w TMJ and less c/w temporal arteritis since no monocular vision loss. Noticed in the past 1 month that her vision is more fuzzy and  "floaters - reminiscent of when she had cataract removal about 4-5 yrs ago. No visual field loss or bright flashing lights. No vision exam since that time so planning on following up with ophthalmology for recheck.   HA now down to 2-3/10 and more of an annoyance. Known hx of TMJ and knows she grinds/clenches teeth, but hasn't gotten a new  after having dental work since then has to replace it since doesn't fit any more. Knows she needs 2 new crowns.  Took ibuprofen (600mg) and tylenol (1500mg) alternating every 4-5 hours and aspirin.     Current Status: still has headache which she gets all the time- low back pain very painful and her main concern- hurts more in morning takes time to get up or sitting down - and late afternoon-certain movements feels like bones hitting together    2) Chronic back pain x1 yr - midline low back radiates to R low back. No radicular symptoms, but does note that R toe feels colder. Denies loss of bowel/bladder control, saddle paresthesia. No recalled injury, but did fall down some stairs a few months ago which certainly feels didn't make things any better, but can't say necessarily made things worse. Feels worsening in severity overall over time. Used to be here and there and now waking every morning with it and going to bed with it every night x6 months ago. 7/10 in AM - always feels tight upon waking and down to 3/10 as day goes on so movement seems to help unless she does something that exacerbates ie) standing on a fishing boat all day. By day end usually about 5/10.    Hard to sit down when back pain is bad. If seat is turned wrong way will feel like bones are \"hitting against each other\".  Treatment: had been taking ibuprofen and tylenol, but didn't make a difference so stopped use.  Salon pas, aspercrekeena   Fhx: mom had fibromyalgia  No hx of known autoimmune disease or inflammatory arthritis that she's aware of.       3) Look at big toe left foot discolored. 3-4 years. " "Tried OTC treatment.    Current Outpatient Medications   Medication     acetaminophen (TYLENOL) 500 MG tablet     aspirin (ASA) 81 MG EC tablet     ibuprofen (ADVIL/MOTRIN) 200 MG tablet     multivitamin w/minerals (MULTI-VITAMIN) tablet     vitamin B complex with vitamin C (VITAMIN  B COMPLEX) tablet     No current facility-administered medications for this visit.        Allergies   Allergen Reactions     Vicodin [Hydrocodone-Acetaminophen] Nausea and Vomiting       Review of Systems   Constitutional, HEENT, cardiovascular, pulmonary, MSK, neuro, skin systems are negative, except as otherwise noted.      Objective    /80   Pulse 85   Temp 98.2  F (36.8  C) (Tympanic)   Resp 14   Ht 1.632 m (5' 4.25\")   Wt 96.2 kg (212 lb)   SpO2 99%   BMI 36.11 kg/m    Body mass index is 36.11 kg/m .  Physical Exam   GENERAL: healthy, alert and no distress  EYES: Eyes grossly normal to inspection, PERRL and conjunctivae and sclerae normal  HEAD: mild tenderness over temporal region, but no redness, warmth or any exquisite pain over R temporal artery. Am able to reproduce patient's pain with palpation over temporalis muscle and with jaw clenching.   HENT: ear canals and TM's normal, nose and mouth without ulcers or lesions  MS: ambulates without antalgic gait. Normal heel-toe walking. ROM is limited especially with forward flexion due to midline and R lumbar back pain. - only can get to about 45 degrees of flexion. Pain incited in areas of same with R rotation, lateral bending, facet loading and extension. Very tender along superior aspect of lumbar spine and R SI joint. 5/5 strength to resistance testing of LE, but mildly decreased bilaterally due to pain with knee extension and hip flexion.   NEURO: mentation intact, normal 2+ LE DTRs. ? + SLR on R due to causing worsening pain, but no radicular symptoms.  PSYCH: mentation appears normal, affect normal/bright  SKIN/NAIL: focused exam of L great toe shows thickening, " yellowing of toenail suggestive of onychomycosis.    Xray - Reviewed and interpreted by me. Lumbar alignment appears normal excluding ? Slight spondylolisthesis of L5 on S1. Loss of disc height is noted at L3-L4 c/w DDD. No apparent fracture. Will await final reading by radiology.

## 2021-07-19 ENCOUNTER — THERAPY VISIT (OUTPATIENT)
Dept: PHYSICAL THERAPY | Facility: CLINIC | Age: 46
End: 2021-07-19
Payer: COMMERCIAL

## 2021-07-19 DIAGNOSIS — G89.29 CHRONIC BILATERAL LOW BACK PAIN WITHOUT SCIATICA: ICD-10-CM

## 2021-07-19 DIAGNOSIS — M54.50 CHRONIC BILATERAL LOW BACK PAIN WITHOUT SCIATICA: ICD-10-CM

## 2021-07-19 DIAGNOSIS — M54.50 ACUTE LEFT-SIDED LOW BACK PAIN WITHOUT SCIATICA: Primary | ICD-10-CM

## 2021-07-19 PROCEDURE — 97110 THERAPEUTIC EXERCISES: CPT | Mod: GP | Performed by: PHYSICAL THERAPIST

## 2021-07-19 PROCEDURE — 97161 PT EVAL LOW COMPLEX 20 MIN: CPT | Mod: GP | Performed by: PHYSICAL THERAPIST

## 2021-07-19 NOTE — PROGRESS NOTES
Physical Therapy Initial Evaluation  Subjective:  The history is provided by the patient. No  was used.   Patient Health History  Teresa Brian being seen for Low Back Pain.     Problem began: 6/1/2020 (new L sided 7/18/2021).   Problem occurred: No injury just steadily been getting worse to where I have pain everyday   Pain is reported as 5/10 on pain scale.  General health as reported by patient is fair.  Pertinent medical history includes: migraines/headaches and overweight.   Red flags:  None as reported by patient.  Medical allergies: other. Other medical allergies details: Vicodine.   Surgeries include:  Orthopedic surgery and other. Other surgery history details: R knee, r shoulder.    Current medications:  Anti-inflammatory and pain medication.    Current occupation is .   Primary job tasks include:  Computer work, lifting/carrying, prolonged sitting, prolonged standing, pushing/pulling and repetitive tasks.                  Therapist Generated HPI Evaluation         Type of problem:  Lumbar.    This is a recurrent (overall LBP on/off each day, pt with new episode of acute L LBP (new for the past 24hrs).  Denies leg pain.) condition.  Condition occurred with:  Other reason.  Where condition occurred: for unknown reasons.  Patient reports pain:  Mid lumbar spine, lower lumbar spine and lumbar spine left.  Pain is described as aching, sharp and shooting and is intermittent.  Pain radiates to:  No radiation. Pain is worse in the A.M..  Since onset symptoms are gradually worsening (past 24hrs with newer acute L LBP).  Associated symptoms:  Loss of motion/stiffness. Symptoms are exacerbated by bending, lifting, carrying and certain positions  and relieved by analgesics and NSAID's.  Special tests included:  X-ray.    Restrictions due to condition include:  Working in normal job without restrictions.  Barriers include:  None as reported by patient.                         Objective:  Standing Alignment:        Lumbar deviations alignment: standing good neutral posture, sitting posture pt leans to Right.                           Lumbar/SI Evaluation  ROM:  Arom wnl lumbar: standing L LBP 5-6/10, prone lying 1/10.  repeated SG L difficult with pain L no better.  TC difficult getting up but then ok - 3 sets no change. no better.  AROM Lumbar:   Flexion:          Max loss w pain, difficult coming back up using hands to crawl up  Ext:                    Max loss w pain   Side Bend:        Left:  Max loss w L pain    Right:  Mod loss w pain R  Rotation:           Left:     Right:   Side Glide:        Left:     Right:           Lumbar Myotomes:  normal                  Neural Tension/Mobility:  Lumbar:  Not assessed        Lumbar Palpation:  Palpation (lumbar): central Lumbar L2-L4, paraspinal tenderness.        Lumbar Provocation:  not assessed                                                       Marija Lumbar Evaluation    Posture:  Sitting: poor  Standing: good    Lateral Shift: no                                                 Prone Lying - most effective position for pain reduction.  TC not tolerated    ROS    Assessment/Plan:    Patient is a 45 year old female with lumbar complaints.    Patient has the following significant findings with corresponding treatment plan.                Diagnosis 1:  Acute L LBP on B Lumbar chronic pain  Pain -  hot/cold therapy, electric stimulation, mechanical traction, splint/taping/bracing/orthotics, self management and education  Decreased ROM/flexibility - manual therapy and therapeutic exercise  Inflammation - cold therapy  Impaired gait - gait training  Decreased function - therapeutic activities  Impaired posture - neuro re-education    Previous and current functional limitations:  (See Goal Flow Sheet for this information)    Short term and Long term goals: (See Goal Flow Sheet for this information)     Communication ability:  Patient  appears to be able to clearly communicate and understand verbal and written communication and follow directions correctly.  Treatment Explanation - The following has been discussed with the patient:   RX ordered/plan of care  Anticipated outcomes  Possible risks and side effects  This patient would benefit from PT intervention to resume normal activities.   Rehab potential is good.    Frequency:  1-2 X week, once daily  Duration:  for 6 weeks  Discharge Plan:  Achieve all LTG.  Independent in home treatment program.  Reach maximal therapeutic benefit.    Please refer to the daily flowsheet for treatment today, total treatment time and time spent performing 1:1 timed codes.

## 2021-07-26 ENCOUNTER — THERAPY VISIT (OUTPATIENT)
Dept: PHYSICAL THERAPY | Facility: CLINIC | Age: 46
End: 2021-07-26
Payer: COMMERCIAL

## 2021-07-26 DIAGNOSIS — M54.50 CHRONIC BILATERAL LOW BACK PAIN WITHOUT SCIATICA: ICD-10-CM

## 2021-07-26 DIAGNOSIS — G89.29 CHRONIC BILATERAL LOW BACK PAIN WITHOUT SCIATICA: ICD-10-CM

## 2021-07-26 DIAGNOSIS — M54.50 ACUTE LEFT-SIDED LOW BACK PAIN WITHOUT SCIATICA: ICD-10-CM

## 2021-07-26 PROCEDURE — 97110 THERAPEUTIC EXERCISES: CPT | Mod: GP | Performed by: PHYSICAL THERAPIST

## 2021-07-26 PROCEDURE — 97112 NEUROMUSCULAR REEDUCATION: CPT | Mod: GP | Performed by: PHYSICAL THERAPIST

## 2021-08-02 ENCOUNTER — THERAPY VISIT (OUTPATIENT)
Dept: PHYSICAL THERAPY | Facility: CLINIC | Age: 46
End: 2021-08-02
Payer: COMMERCIAL

## 2021-08-02 DIAGNOSIS — M54.50 CHRONIC BILATERAL LOW BACK PAIN WITHOUT SCIATICA: ICD-10-CM

## 2021-08-02 DIAGNOSIS — M54.50 ACUTE LEFT-SIDED LOW BACK PAIN WITHOUT SCIATICA: ICD-10-CM

## 2021-08-02 DIAGNOSIS — G89.29 CHRONIC BILATERAL LOW BACK PAIN WITHOUT SCIATICA: ICD-10-CM

## 2021-08-02 PROCEDURE — 97110 THERAPEUTIC EXERCISES: CPT | Mod: GP | Performed by: PHYSICAL THERAPIST

## 2021-08-02 PROCEDURE — 97012 MECHANICAL TRACTION THERAPY: CPT | Mod: GP | Performed by: PHYSICAL THERAPIST

## 2021-08-02 NOTE — PROGRESS NOTES
Subjective:  HPI  Physical Exam                    Objective:  System    Physical Exam    General     ROS    Assessment/Plan:    SUBJECTIVE  Subjective changes as noted by pt:  Nichole returns for her 3rd PT visit.  Unfortunately she is not feeling better, feeling about all the same.  Before her first PT appointment her LBP flared up and over the past few weeks this has not improved.  Current pain level: 3/10 Current Pain level: 3/10 (1/10 L throbbing ache)   Changes in function:  None     Adverse reaction to treatment or activity:  None    OBJECTIVE  Changes in objective findings:  None  Objective: Lumbar AROM Flex - mod loss w pain, difficult getting back up,  SB R pain R, SB L stretch/pain, extension - mod-max loss w pain.  SKC no change, DKC no change.  No direction of preference noted so far with her lumbar spine.  Prone lying tolerated well, any further extension not tolerated (TC, press ups).    ASSESSMENT  Teresa continues to require intervention to meet STG and LTG's: PT  No change of symptoms has been noted.  Patient is not progressing as expected.  Response to therapy has shown lack of progress in  pain level and ROM   Progress made towards STG/LTG?  None    PLAN  Nichole will call [facility] regarding further assessment of her LBP that so far has not improved with 3 PT visits.  Pt reports having similar LBP acute pain last year and did some Chiro but this also did not help.  Nichole also reports having 5 day steroid pack this year but no change and this steroid impacted her stomach.  Siddhartha sx/sx's possible lumbar spondy that is not responding to mid range stretching, trial of traction during no change, walking afterwards patient reports feeling little better.  Nichole can return to PT PRN.    Please refer to the daily flowsheet for treatment today, total treatment time and time spent performing 1:1 timed codes.

## 2021-08-05 ENCOUNTER — OFFICE VISIT (OUTPATIENT)
Dept: PEDIATRICS | Facility: CLINIC | Age: 46
End: 2021-08-05
Payer: COMMERCIAL

## 2021-08-05 VITALS
RESPIRATION RATE: 16 BRPM | OXYGEN SATURATION: 98 % | DIASTOLIC BLOOD PRESSURE: 81 MMHG | WEIGHT: 218.4 LBS | HEART RATE: 81 BPM | BODY MASS INDEX: 37.2 KG/M2 | SYSTOLIC BLOOD PRESSURE: 121 MMHG | TEMPERATURE: 98.1 F

## 2021-08-05 DIAGNOSIS — R07.89 MIDSTERNAL CHEST PAIN: Primary | ICD-10-CM

## 2021-08-05 PROCEDURE — 93000 ELECTROCARDIOGRAM COMPLETE: CPT | Performed by: NURSE PRACTITIONER

## 2021-08-05 PROCEDURE — 99214 OFFICE O/P EST MOD 30 MIN: CPT | Performed by: NURSE PRACTITIONER

## 2021-08-05 NOTE — PROGRESS NOTES
Assessment & Plan     Midsternal chest pain  We reviewed her symptoms in depth today. Considered differentials include reflux, musculoskeletal, angina,anxiety. Her symptoms don't fit in any of these. EKG normal, will also do zio patch. Considered lab work, but given lack of other concerning symptoms, will wait and see if it resolves with time. Will follow-up with PCP.   - EKG 12-lead complete w/read - Clinics  - Zio Patch Holter 48 Hour Adult Pediatric; Future         Return in about 2 months (around 10/5/2021) for Follow up.    Saniya Peoples, JEAN-PAUL CNP  [facility] YOVANY Varela is a 46 year old who presents for the following health issues     HPI     Chest Pain  Onset/Duration: for a while now, now becoming 1-2x per day, previously 1-4x per month.  Description:   Location: center of chest  Character: really tight, discomfort, hurts really bad, waking at night, position doesn't matter, felt like air bubble in the beginning, belching helped in the beginning 50% of the time but now not so much, lasts 5-15mins and comes back within a few hours, hurts to wear tighter bra  Radiation: into her back  Duration: 5-15mins   Intensity: moderate enough to make appointment  Progression of Symptoms: worsening and comes and goes  Accompanying Signs & Symptoms:  Shortness of breath: no  Sweating: no  Nausea/vomiting: no  Lightheadedness: YES  Palpitations: YES 1-2x per year but attributes to stomach  Fever/Chills: no  Cough: no           Heartburn: YES  History:   Family history of heart disease: no  Tobacco use: no  Previous similar symptoms: no   Precipitating factors:   Worse with exertion: no  Worse with deep breaths: never tried           Related to eating: no           Better with burping: see above  Alleviating factors: nothing works  Therapies tried and outcome:  Omeprazole, Pepcid, GasX, Zantac,Tagamet-nothing working long term    midsternal chest pain that comes and goes, can last  "5-15 min, described as \"discomfort\" and pressure. Feels like she may have to burp and OTC medicatiosn don't seem to help. Can't think of triggers, can happen on empty or full stomach, not associated with activity, can occur in the middle of the night. Not associated with stooling. Can't think of precip factors. No increase pain of chest with with increased HR or exercise. No other symptoms including SOB, dizziness, headache. No sour taste of her mouth.     Review of Systems   Constitutional, HEENT, cardiovascular, pulmonary, GI, , musculoskeletal, neuro, skin, endocrine and psych systems are negative, except as otherwise noted.      Objective    /81   Pulse 81   Temp 98.1  F (36.7  C) (Oral)   Resp 16   Wt 99.1 kg (218 lb 6.4 oz)   SpO2 98%   BMI 37.20 kg/m    Body mass index is 37.2 kg/m .  Physical Exam   GENERAL: healthy, alert and no distress  EYES: Eyes grossly normal to inspection, PERRL and conjunctivae and sclerae normal  HENT: ear canals and TM's normal, nose and mouth without ulcers or lesions  NECK: no adenopathy, no asymmetry, masses, or scars and thyroid normal to palpation  RESP: lungs clear to auscultation - no rales, rhonchi or wheezes  CV: regular rate and rhythm, normal S1 S2, no S3 or S4, no murmur, click or rub, no peripheral edema and peripheral pulses strong  ABDOMEN: soft, nontender, no hepatosplenomegaly, no masses and bowel sounds normal  MS: no gross musculoskeletal defects noted, no edema  PSYCH: mentation appears normal, affect normal/bright    EKG - Reviewed and interpreted by me appears normal, NSR, normal axis, normal intervals, no acute ST/T changes c/w ischemia, no LVH by voltage criteria, unchanged from previous tracings          "

## 2021-08-05 NOTE — PATIENT INSTRUCTIONS
EKG today and you will get a call to schedule the holter (zio patch) monitor x 48 hours. I will be in touch with results when I get them.     Schedule a Follow-up with your PCP. Could consider further evaluation if symptoms don't improve

## 2021-10-20 ENCOUNTER — MYC MEDICAL ADVICE (OUTPATIENT)
Dept: PEDIATRICS | Facility: CLINIC | Age: 46
End: 2021-10-20

## 2021-10-23 ENCOUNTER — HEALTH MAINTENANCE LETTER (OUTPATIENT)
Age: 46
End: 2021-10-23

## 2021-12-13 PROBLEM — M54.50 ACUTE LEFT-SIDED LOW BACK PAIN WITHOUT SCIATICA: Status: RESOLVED | Noted: 2021-07-19 | Resolved: 2021-12-13

## 2021-12-13 PROBLEM — G89.29 CHRONIC BILATERAL LOW BACK PAIN WITHOUT SCIATICA: Status: RESOLVED | Noted: 2021-07-19 | Resolved: 2021-12-13

## 2021-12-13 PROBLEM — M54.50 CHRONIC BILATERAL LOW BACK PAIN WITHOUT SCIATICA: Status: RESOLVED | Noted: 2021-07-19 | Resolved: 2021-12-13

## 2022-04-09 ENCOUNTER — HEALTH MAINTENANCE LETTER (OUTPATIENT)
Age: 47
End: 2022-04-09

## 2022-04-18 ENCOUNTER — OFFICE VISIT (OUTPATIENT)
Dept: FAMILY MEDICINE | Facility: CLINIC | Age: 47
End: 2022-04-18
Payer: COMMERCIAL

## 2022-04-18 ENCOUNTER — ANCILLARY PROCEDURE (OUTPATIENT)
Dept: GENERAL RADIOLOGY | Facility: CLINIC | Age: 47
End: 2022-04-18
Attending: NURSE PRACTITIONER
Payer: COMMERCIAL

## 2022-04-18 VITALS
WEIGHT: 221.2 LBS | HEART RATE: 75 BPM | OXYGEN SATURATION: 99 % | DIASTOLIC BLOOD PRESSURE: 86 MMHG | SYSTOLIC BLOOD PRESSURE: 124 MMHG | TEMPERATURE: 97.6 F | BODY MASS INDEX: 37.76 KG/M2 | HEIGHT: 64 IN | RESPIRATION RATE: 12 BRPM

## 2022-04-18 DIAGNOSIS — Z11.59 NEED FOR HEPATITIS C SCREENING TEST: ICD-10-CM

## 2022-04-18 DIAGNOSIS — R05.3 CHRONIC COUGH: ICD-10-CM

## 2022-04-18 DIAGNOSIS — Z12.31 VISIT FOR SCREENING MAMMOGRAM: ICD-10-CM

## 2022-04-18 DIAGNOSIS — R05.3 CHRONIC COUGH: Primary | ICD-10-CM

## 2022-04-18 DIAGNOSIS — R53.83 FATIGUE, UNSPECIFIED TYPE: ICD-10-CM

## 2022-04-18 LAB
ALBUMIN SERPL-MCNC: 3.6 G/DL (ref 3.4–5)
ALP SERPL-CCNC: 66 U/L (ref 40–150)
ALT SERPL W P-5'-P-CCNC: 21 U/L (ref 0–50)
ANION GAP SERPL CALCULATED.3IONS-SCNC: 4 MMOL/L (ref 3–14)
AST SERPL W P-5'-P-CCNC: 16 U/L (ref 0–45)
BILIRUB SERPL-MCNC: 0.4 MG/DL (ref 0.2–1.3)
BUN SERPL-MCNC: 10 MG/DL (ref 7–30)
CALCIUM SERPL-MCNC: 8.4 MG/DL (ref 8.5–10.1)
CHLORIDE BLD-SCNC: 107 MMOL/L (ref 94–109)
CO2 SERPL-SCNC: 27 MMOL/L (ref 20–32)
CREAT SERPL-MCNC: 0.68 MG/DL (ref 0.52–1.04)
ERYTHROCYTE [DISTWIDTH] IN BLOOD BY AUTOMATED COUNT: 12.1 % (ref 10–15)
FERRITIN SERPL-MCNC: 94 NG/ML (ref 8–252)
GFR SERPL CREATININE-BSD FRML MDRD: >90 ML/MIN/1.73M2
GLUCOSE BLD-MCNC: 94 MG/DL (ref 70–99)
HCT VFR BLD AUTO: 39.6 % (ref 35–47)
HGB BLD-MCNC: 12.8 G/DL (ref 11.7–15.7)
IRON SERPL-MCNC: 102 UG/DL (ref 35–180)
MCH RBC QN AUTO: 30.5 PG (ref 26.5–33)
MCHC RBC AUTO-ENTMCNC: 32.3 G/DL (ref 31.5–36.5)
MCV RBC AUTO: 94 FL (ref 78–100)
PLATELET # BLD AUTO: 187 10E3/UL (ref 150–450)
POTASSIUM BLD-SCNC: 3.9 MMOL/L (ref 3.4–5.3)
PROT SERPL-MCNC: 6.9 G/DL (ref 6.8–8.8)
RBC # BLD AUTO: 4.2 10E6/UL (ref 3.8–5.2)
SODIUM SERPL-SCNC: 138 MMOL/L (ref 133–144)
TSH SERPL DL<=0.005 MIU/L-ACNC: 1.52 MU/L (ref 0.4–4)
WBC # BLD AUTO: 6.9 10E3/UL (ref 4–11)

## 2022-04-18 PROCEDURE — 85027 COMPLETE CBC AUTOMATED: CPT | Performed by: NURSE PRACTITIONER

## 2022-04-18 PROCEDURE — 83540 ASSAY OF IRON: CPT | Performed by: NURSE PRACTITIONER

## 2022-04-18 PROCEDURE — 82728 ASSAY OF FERRITIN: CPT | Performed by: NURSE PRACTITIONER

## 2022-04-18 PROCEDURE — 71046 X-RAY EXAM CHEST 2 VIEWS: CPT | Performed by: RADIOLOGY

## 2022-04-18 PROCEDURE — 36415 COLL VENOUS BLD VENIPUNCTURE: CPT | Performed by: NURSE PRACTITIONER

## 2022-04-18 PROCEDURE — 99214 OFFICE O/P EST MOD 30 MIN: CPT | Performed by: NURSE PRACTITIONER

## 2022-04-18 PROCEDURE — 84443 ASSAY THYROID STIM HORMONE: CPT | Performed by: NURSE PRACTITIONER

## 2022-04-18 PROCEDURE — 86803 HEPATITIS C AB TEST: CPT | Performed by: NURSE PRACTITIONER

## 2022-04-18 PROCEDURE — 80053 COMPREHEN METABOLIC PANEL: CPT | Performed by: NURSE PRACTITIONER

## 2022-04-18 RX ORDER — NICOTINE POLACRILEX 4 MG/1
20 GUM, CHEWING ORAL PRN
COMMUNITY
Start: 2022-04-04 | End: 2022-07-29

## 2022-04-18 ASSESSMENT — PAIN SCALES - GENERAL: PAINLEVEL: NO PAIN (0)

## 2022-04-18 NOTE — PROGRESS NOTES
Assessment & Plan     Chronic cough  Etiology unclear   CXR today normal  Recommend PFTs to r/o asthma vs COPD.  If normal, consider pulmonology consult.  - XR Chest 2 Views; Future  - General PFT Lab (Please always keep checked); Future  - Pulmonary Function Test; Future    Fatigue, unspecified type  Etiology unclear.  Lab work up today:  - TSH with free T4 reflex; Future  - CBC with platelets; Future  - Comprehensive metabolic panel (BMP + Alb, Alk Phos, ALT, AST, Total. Bili, TP); Future  - Iron; Future  - Ferritin; Future  - TSH with free T4 reflex  - CBC with platelets  - Comprehensive metabolic panel (BMP + Alb, Alk Phos, ALT, AST, Total. Bili, TP)  - Iron  - Ferritin  Results all normal.  Discussed lifestyle changes.  Recommend sleep clinic appointment due to falling asleep at inappropriate times during the day.    Need for hepatitis C screening test  - Hepatitis C Screen Reflex to HCV RNA Quant and Genotype; Future  - Hepatitis C Screen Reflex to HCV RNA Quant and Genotype    Visit for screening mammogram  - MA SCREENING DIGITAL BILAT - Future  (s+30); Future      The risks, benefits and treatment options of prescribed medications or other treatments have been discussed with the patient. The patient verbalized their understanding and should call or follow up if no improvement or if they develop further problems.    JEAN-PAUL Alcala CNP  [facility] Varela is a 46 year old who presents for the following health issues     History of Present Illness       Reason for visit:  Cough for 2 yrs, shortness of breath, heart rate spikea and pounding, pooping at night  Symptom onset:  More than a month  Symptoms include:  Cough, hard to breath when I walk, heart racing  Symptom intensity:  Moderate  Symptom progression:  Staying the same  Had these symptoms before:  No  What makes it worse:  Walking more than a block  What makes it better:  No    She eats 2-3 servings of  fruits and vegetables daily.She consumes 0 sweetened beverage(s) daily.She exercises with enough effort to increase her heart rate 9 or less minutes per day.  She exercises with enough effort to increase her heart rate 3 or less days per week.   She is taking medications regularly.       Cough  Concerns: cough on and off for the past couple years associated with vomiting when coughing hard enough, also shortness of breath, severe fatigue and tachycardia at random times   Onset/Duration: cough (2 years, on and off)   Cough is always worse at night  Also triggered by laughing.  Symptoms:  Fever: no  Chills/Sweats: no  Headache (location?): YES  Sinus Pressure: no  Conjunctivitis:  no  Ear Pain: no  Rhinorrhea: no  Congestion: YES  Sore Throat: no  Cough: YES-productive of clear sputum and dry   Wheeze: YES a little at night, shortness of breath  Decreased Appetite: no  Nausea: no  Vomiting: YES after coughing so hard at night   Diarrhea: YES at night mostly   Dysuria/Freq.: no  Dysuria or Hematuria: no  Fatigue/Achiness: YES- severe fatigue, started noticing about a month ago  Sick/Strep Exposure: no  Therapies tried and outcome: None    No h/o smoking.  Was told she had asthma in high school - but hasn't needed an inhaler since high school.    Has had intermittent heart racing  Occurs randomly  No chest pain.  Was seen for this last summer/fall - EKG normal. Zio patch ordered but patient didn't follow through.    Wt Readings from Last 4 Encounters:   04/18/22 100.3 kg (221 lb 3.2 oz)   08/05/21 99.1 kg (218 lb 6.4 oz)   06/17/21 96.2 kg (212 lb)   06/14/21 96.3 kg (212 lb 4.8 oz)       For the last 4 weeks:    Severe fatigue  Falling asleep during the day at inappropriate times - at work, while driving.  Sleep well at night - wakes up feeling well rested.  Getting 6 hours of sleep per night - this has been her norm or years.    No blood in urine or stool.  Periods have always been heavy - no change.    Getting up  "once in the night to have a bowel movement.  Stool is sometimes formed, sometimes liquid.          Review of Systems   Constitutional, HEENT, cardiovascular, pulmonary, gi and gu systems are negative, except as otherwise noted.      Objective    /86 (BP Location: Right arm, Patient Position: Chair, Cuff Size: Adult Regular)   Pulse 75   Temp 97.6  F (36.4  C) (Tympanic)   Resp 12   Ht 1.626 m (5' 4\")   Wt 100.3 kg (221 lb 3.2 oz)   SpO2 99%   Breastfeeding No   BMI 37.97 kg/m    Body mass index is 37.97 kg/m .  Physical Exam   GENERAL: healthy, alert and no distress  HENT: ear canals and TM's normal, nose and mouth without ulcers or lesions  NECK: no adenopathy, no asymmetry, masses, or scars and thyroid normal to palpation  RESP: lungs clear to auscultation - no rales, rhonchi or wheezes  CV: regular rate and rhythm, normal S1 S2, no S3 or S4, no murmur, click or rub, no peripheral edema and peripheral pulses strong  ABDOMEN: soft, nontender, no hepatosplenomegaly, no masses and bowel sounds normal  MS: no gross musculoskeletal defects noted, no edema                "

## 2022-04-19 LAB — HCV AB SERPL QL IA: NONREACTIVE

## 2022-05-10 ENCOUNTER — MYC MEDICAL ADVICE (OUTPATIENT)
Dept: FAMILY MEDICINE | Facility: CLINIC | Age: 47
End: 2022-05-10
Payer: COMMERCIAL

## 2022-05-10 DIAGNOSIS — R00.2 PALPITATIONS: Primary | ICD-10-CM

## 2022-05-11 NOTE — TELEPHONE ENCOUNTER
New order placed    She also needs to schedule the PFTs and sleep clinic appointment.  Carey Bowen, CNP

## 2022-05-11 NOTE — TELEPHONE ENCOUNTER
Carey,    Patient sends my chart stating she can not schedule with cardiac as there are no orders. I see PFT's and sleep med referral.  Please advise. Glory LAURA RN

## 2022-05-18 ENCOUNTER — HOSPITAL ENCOUNTER (OUTPATIENT)
Dept: CARDIOLOGY | Facility: CLINIC | Age: 47
Discharge: HOME | End: 2022-05-18
Attending: NURSE PRACTITIONER | Admitting: NURSE PRACTITIONER
Payer: COMMERCIAL

## 2022-05-18 DIAGNOSIS — R00.2 PALPITATIONS: ICD-10-CM

## 2022-05-18 PROCEDURE — 93246 EXT ECG>7D<15D RECORDING: CPT

## 2022-05-18 PROCEDURE — 93248 EXT ECG>7D<15D REV&INTERPJ: CPT | Performed by: INTERNAL MEDICINE

## 2022-06-04 ENCOUNTER — HEALTH MAINTENANCE LETTER (OUTPATIENT)
Age: 47
End: 2022-06-04

## 2022-06-14 ENCOUNTER — HOSPITAL ENCOUNTER (OUTPATIENT)
Dept: RESPIRATORY THERAPY | Facility: CLINIC | Age: 47
Discharge: HOME | End: 2022-06-14
Attending: PHYSICIAN ASSISTANT | Admitting: PHYSICIAN ASSISTANT
Payer: COMMERCIAL

## 2022-06-14 DIAGNOSIS — R05.3 CHRONIC COUGH: ICD-10-CM

## 2022-06-14 PROCEDURE — 94729 DIFFUSING CAPACITY: CPT

## 2022-06-14 PROCEDURE — 94060 EVALUATION OF WHEEZING: CPT

## 2022-06-14 PROCEDURE — 250N000009 HC RX 250: Performed by: NURSE PRACTITIONER

## 2022-06-14 PROCEDURE — 94726 PLETHYSMOGRAPHY LUNG VOLUMES: CPT

## 2022-06-14 RX ORDER — ALBUTEROL SULFATE 0.83 MG/ML
2.5 SOLUTION RESPIRATORY (INHALATION) ONCE
Status: COMPLETED | OUTPATIENT
Start: 2022-06-14 | End: 2022-06-14

## 2022-06-14 RX ADMIN — ALBUTEROL SULFATE 2.5 MG: 2.5 SOLUTION RESPIRATORY (INHALATION) at 11:30

## 2022-06-14 NOTE — PROGRESS NOTES
Complete PFT for SPRAGUE/ chronic cough completed. Patient left PFT Lab in no distress and without complaint.

## 2022-06-16 LAB
DLCOCOR-%PRED-PRE: 108 %
DLCOCOR-PRE: 23.14 ML/MIN/MMHG
DLCOUNC-%PRED-PRE: 106 %
DLCOUNC-PRE: 22.71 ML/MIN/MMHG
DLCOUNC-PRED: 21.39 ML/MIN/MMHG
ERV-%PRED-PRE: 36 %
ERV-PRE: 0.19 L
ERV-PRED: 0.53 L
EXPTIME-PRE: 6.68 SEC
FEF2575-%PRED-POST: 111 %
FEF2575-%PRED-PRE: 78 %
FEF2575-POST: 3.26 L/SEC
FEF2575-PRE: 2.3 L/SEC
FEF2575-PRED: 2.92 L/SEC
FEFMAX-%PRED-PRE: 86 %
FEFMAX-PRE: 5.92 L/SEC
FEFMAX-PRED: 6.85 L/SEC
FEV1-%PRED-PRE: 83 %
FEV1-PRE: 2.38 L
FEV1FEV6-PRE: 81 %
FEV1FEV6-PRED: 83 %
FEV1FVC-PRE: 81 %
FEV1FVC-PRED: 81 %
FEV1SVC-PRE: 78 %
FEV1SVC-PRED: 80 %
FIFMAX-PRE: 4.2 L/SEC
FRCPLETH-%PRED-PRE: 110 %
FRCPLETH-PRE: 2.97 L
FRCPLETH-PRED: 2.69 L
FVC-%PRED-PRE: 82 %
FVC-PRE: 2.93 L
FVC-PRED: 3.55 L
GAW-%PRED-PRE: 41 %
GAW-PRE: 0.42 L/S/CMH2O
GAW-PRED: 1.03 L/S/CMH2O
IC-%PRED-PRE: 92 %
IC-PRE: 2.81 L
IC-PRED: 3.04 L
RVPLETH-%PRED-PRE: 162 %
RVPLETH-PRE: 2.73 L
RVPLETH-PRED: 1.68 L
SGAW-%PRED-PRE: 133 %
SGAW-PRE: 0.14 1/CMH2O*S
SGAW-PRED: 0.1 1/CMH2O*S
SRAW-%PRED-PRE: 155 %
SRAW-PRE: 7.42 CMH2O*S
SRAW-PRED: 4.76 CMH2O*S
TLCPLETH-%PRED-PRE: 117 %
TLCPLETH-PRE: 5.78 L
TLCPLETH-PRED: 4.94 L
VA-%PRED-PRE: 93 %
VA-PRE: 4.57 L
VC-%PRED-PRE: 85 %
VC-PRE: 3.05 L
VC-PRED: 3.56 L

## 2022-06-17 ENCOUNTER — ANCILLARY PROCEDURE (OUTPATIENT)
Dept: GENERAL RADIOLOGY | Facility: CLINIC | Age: 47
End: 2022-06-17
Attending: NURSE PRACTITIONER
Payer: COMMERCIAL

## 2022-06-17 ENCOUNTER — OFFICE VISIT (OUTPATIENT)
Dept: URGENT CARE | Facility: URGENT CARE | Age: 47
End: 2022-06-17
Payer: COMMERCIAL

## 2022-06-17 VITALS
OXYGEN SATURATION: 96 % | HEART RATE: 90 BPM | DIASTOLIC BLOOD PRESSURE: 78 MMHG | TEMPERATURE: 98.4 F | SYSTOLIC BLOOD PRESSURE: 127 MMHG

## 2022-06-17 DIAGNOSIS — S89.92XA LEG INJURY, LEFT, INITIAL ENCOUNTER: ICD-10-CM

## 2022-06-17 DIAGNOSIS — R05.3 CHRONIC COUGH: Primary | ICD-10-CM

## 2022-06-17 DIAGNOSIS — S96.912A ANKLE STRAIN, LEFT, INITIAL ENCOUNTER: Primary | ICD-10-CM

## 2022-06-17 PROCEDURE — 99214 OFFICE O/P EST MOD 30 MIN: CPT | Performed by: NURSE PRACTITIONER

## 2022-06-17 PROCEDURE — 73610 X-RAY EXAM OF ANKLE: CPT | Mod: TC | Performed by: RADIOLOGY

## 2022-06-17 RX ORDER — HYDROCODONE BITARTRATE AND ACETAMINOPHEN 5; 325 MG/1; MG/1
1 TABLET ORAL EVERY 8 HOURS PRN
Qty: 23 TABLET | Refills: 0 | Status: SHIPPED | OUTPATIENT
Start: 2022-06-17 | End: 2022-06-24

## 2022-06-17 NOTE — PROGRESS NOTES
SUBJECTIVE:  Teresa Brian is a 46 year old female who presents today for left ankle pain.   Injury occurred 2 hours ago.    The mechanism of injury includes: twisted  Data Unavailable  Quality: painfull   What makes it worse: walking   What makes it better:resting   Associated Signs/ Symptoms:   Treatment: ice and ibuprofen  History of recurrent ankle injuries: no  Symptoms are Unchanged  Denies any numbness/tingling.    Past Medical History:   Diagnosis Date     Asthma      BMI 34.0-34.9,adult 10/6/2015     CARDIOVASCULAR SCREENING; LDL GOAL LESS THAN 160      Cervical dysplasia 2010 2009-colpo [facility]-->JOANNA 1; persistent LGSIL/ASC-US paps; colposcopy 12/2010 with JOANNA 1, focal JOANNA 2 and benign ECC     Condyloma acuminatum 4/2009    perirecal BX      Herpes simplex virus infection     HSV-1     Menarche age 15    cycles q 3-4 x 3-4 d w cramps     Menorrhagia      Migraine      Personal history of cervical dysplasia 2010    (2009) JOANNA I; (2010) OJANNA I and focal JOANNA II     PONV (postoperative nausea and vomiting)      History   Smoking Status     Never Smoker   Smokeless Tobacco     Never Used     ROS:  Review of systems negative except as stated above.      OBJECTIVE:  Blood pressure 127/78, pulse 90, temperature 98.4  F (36.9  C), SpO2 96 %, not currently breastfeeding.  Patient is alert and in no apparent distress.  Ankle Exam (left):  Inspection:contusion seen medial aspects   Palpation:non-tender throughout  Good doralis pedis and posterior tibial pulses  Neurovascularly Intact Distally.   Special Tests: Anterior drawer sign positive    X-Ray:     Results for orders placed or performed in visit on 06/17/22   XR Ankle Left G/E 3 Views     Status: None    Narrative    EXAM: XR ANKLE LEFT G/E 3 VIEWS  LOCATION: [facility]  DATE/TIME: 6/17/2022 6:59 PM    INDICATION:  Leg injury, left, initial encounter  COMPARISON: None.      Impression    IMPRESSION: Normal joint spaces and  alignment. No fracture. Ankle mortise is intact.       ASSESSMENT:    ICD-10-CM    1. Ankle strain, left, initial encounter  S96.912A HYDROcodone-acetaminophen (NORCO) 5-325 MG tablet     Ankle/Foot Bracing Supplies Order for DME - ONLY FOR DME   2. Leg injury, left, initial encounter  S89.92XA XR Ankle Left G/E 3 Views         PLAN:  Ice, elevate, weight bear as tolerated  Gelcast splint provided today.  Ibuprofen 400-600 mg po tid as needed.  Ice, elevate, weight bear as tolerated  No Gym or sports x 2 weeks - slowly increase activity level with active range of motion exercises encouraged.  Follow up  1 week if not improving     JEAN-PAUL Castelan CNP

## 2022-07-28 NOTE — PROGRESS NOTES
Teresa is a 47 year old  female who presents for annual exam.     Besides routine health maintenance,  she would like to discuss Getting periods every 2 weeks for 7 months, crying for no reason this last month, hot flashes, night sweats, trouble staying asleep. Having a lot of headaches-tylenol not helping. .    HPI:  Here today for yearly exam --doing okay.  Has had periods every 2 weeks since January.  All bleeding episodes similar in lasting 2-4d.  Occ clots and cramping on first 1-2d and then tapers quickly.  No other intermenstrual bleeding/spotting.  +SA --no issues with sex.  Had done depo provera for several years and then tried IUD which was expelled after 2yrs.  Tried OCPs but didn't like taking pill daily.   +hot flushes and irritability in the last several months.  No bowel issues.  Has noticed more bladder frequency and urgency.  No accidents or leaking.  Getting up 1-2x/night --hydration status doesn't seem to change things    Engaged! (x 2yrs); moved to [place] earlier this year; changed jobs --now doing accounting for [employer] --loving her job and work environment; working all in person  -not currently staying active or exercising; knows she should do more  +mammo today; +SBE --no issues  PCP -BRODIE Escamilla; follows bloodwork, meds, etc  -had colonoscopy in 2017  -remote hx of JOANNA II; had focal JOANNA II on colpo biopsy and then negative LEEP (squamous dysplasia); pap smears and co-testing have been normal since that time with last normal in       GYNECOLOGIC HISTORY:    Patient's last menstrual period was 2022 (approximate).    Regular menses? no  Menses every 14 days.  Length of menses: 4 days    Her current contraception method is: vasectomy.  She  reports that she has never smoked. She has never used smokeless tobacco.    Patient is sexually active.  STD testing offered?  Declined  Last PHQ-9 score on record =   PHQ-9 SCORE 2022   PHQ-9 Total Score 0      Last GAD7 score on record =   GARRY-7 SCORE 2022   Total Score 0     Alcohol Score = 3    HEALTH MAINTENANCE:  Cholesterol:  Cholesterol   Date Value Ref Range Status   02/15/2021 191 <200 mg/dL Final   10/23/2015 167 <200 mg/dL Final     Comment:     LDL Cholesterol is the primary guide to therapy.   The NCEP recommends further evaluation of: patients with cholesterol greater   than 200 mg/dL if additional risk factors are present, cholesterol greater   than   240 mg/dL, triglycerides greater than 150 mg/dL, or HDL less than 40 mg/dL.     Last Mammo: One year ago, Result: Normal, Next Mammo: Today   Pap:   Lab Results   Component Value Date    PAP NIL 02/15/2021    PAP NIL 2016   2/15/21 WNL HPV (-)neg  Colonoscopy:  2017, Result: Normal, Next Colonoscopy: 5 more years.  Dexa:  NA    Health maintenance updated:  yes    HISTORY:  OB History    Para Term  AB Living   0 0 0 0 0 0   SAB IAB Ectopic Multiple Live Births   0 0 0 0 0       Patient Active Problem List   Diagnosis     CARDIOVASCULAR SCREENING; LDL GOAL LESS THAN 160     Menorrhagia     History of cervical dysplasia     Migraine with aura and without status migrainosus, not intractable     Non morbid obesity due to excess calories     Family history of Parkinson disease - Father early onset around age 30-40's     S/P ACL reconstruction     Epigastric pain- chronic intermittent since 2017. Extensive normal work-up: hepatic/celiac panels, lipase, TSH, CBC, CRP/ESR, EGD, colonoscopy, HIDA scan, CT abdomen.Per GI may be functional origin      H/O LEEP     Past Surgical History:   Procedure Laterality Date     ARTHROSCOPIC RECONSTRUCTION ANTERIOR CRUCIATE LIGAMENT Right 10/2010    Reconstruction (ACL, MCL) and fibular fx     CATARACT IOL, RT/LT      2017, 2017     COLONOSCOPY       COLONOSCOPY N/A 2017    Procedure: COLONOSCOPY;;  Surgeon: Allan Olivier MD;  Location: [facility]     COLPOSCOPY CERVIX, LOOP ELECTRODE BIOPSY,  COMBINED  08/2011    squamous metaplasia     COMBINED DILATION AND CURETTAGE, ABLATE ENDOMETRIUM FELICIA N/A 5/2/2019    Procedure: FELICIA ABLATION;  Surgeon: Daniella Duncan MD;  Location:  OR     ESOPHAGOSCOPY, GASTROSCOPY, DUODENOSCOPY (EGD), COMBINED       ESOPHAGOSCOPY, GASTROSCOPY, DUODENOSCOPY (EGD), COMBINED N/A 6/27/2017    Procedure: COMBINED ESOPHAGOSCOPY, GASTROSCOPY, DUODENOSCOPY (EGD), BIOPSY SINGLE OR MULTIPLE;  ESOPHAGOSCOPY, GASTROSCOPY, DUODENOSCOPY (EGD with biopsies by cold forcep, Colonoscopy with biopsies by cold forcep.;  Surgeon: Allan Olivier MD;  Location:  GI     HC TOOTH EXTRACTION W/FORCEP  2000    wisdom teeth extraction     LAPAROSCOPY DIAGNOSTIC (GYN)  1992    negative (Aliabadi)     OPERATIVE HYSTEROSCOPY N/A 5/2/2019    Procedure: HYSTEROSCOPY;  Surgeon: Daniella Duncan MD;  Location:  OR     SHOULDER SURGERY  12/30/2019    Left shoulder      Social History     Tobacco Use     Smoking status: Never Smoker     Smokeless tobacco: Never Used   Substance Use Topics     Alcohol use: Yes     Alcohol/week: 2.0 standard drinks     Comment: 2 drinks per week avg      Problem (# of Occurrences) Relation (Name,Age of Onset)    Genetic Disorder (2) Mother (Chance): Fibromyalgia, Father (Keyur): Parkinsons, Brain Anuerism    Neurologic Disorder (2) Mother (Chance): Fibromyalgia. migraines, Father (Keyur): parkinson with early dementia    No Known Problems (7) Maternal Grandmother, Maternal Grandfather, Paternal Grandmother, Paternal Grandfather, Sister, Brother, Other    Other Cancer (1) Sister (Tania): Skin       Negative family history of: Colon Cancer, Thyroid Disease, Diabetes, Hypertension, Hyperlipidemia, Breast Cancer, Skin Cancer            Current Outpatient Medications   Medication Sig     acetaminophen (TYLENOL) 500 MG tablet  (Patient not taking: No sig reported)     ibuprofen (ADVIL/MOTRIN) 200 MG tablet  (Patient not taking: No sig reported)     No current  "facility-administered medications for this visit.     Allergies   Allergen Reactions     Vicodin [Hydrocodone-Acetaminophen] Nausea and Vomiting       Past medical, surgical, social and family histories were reviewed and updated in EPIC.    ROS:   12 point review of systems negative other than symptoms noted below or in the HPI.  Genitourinary: Hot Flashes, Irregular Menses and Night Sweats  Psychiatric: Difficulty Sleeping and Power  No urinary frequency or dysuria, bladder or kidney problems, POSITIVE for:, painful menses, irregular menses    EXAM:  /78   Ht 1.651 m (5' 5\")   Wt 101.2 kg (223 lb)   LMP 07/17/2022 (Approximate)   Breastfeeding No   BMI 37.11 kg/m     BMI: Body mass index is 37.11 kg/m .    PHYSICAL EXAM:  Constitutional:   Appearance: Well nourished, well developed, alert, in no acute distress  Neck:  Lymph Nodes:  No lymphadenopathy present    Thyroid:  Gland size normal, nontender, no nodules or masses present  on palpation  Chest:  Respiratory Effort:  Breathing unlabored  Cardiovascular:    Heart: Auscultation:  Regular rate, normal rhythm, no murmurs present  Breasts: Palpation of Breasts and Axillae:  No masses present on palpation, no breast tenderness., Axillary Lymph Nodes:  No lymphadenopathy present. and No nodularity, asymmetry or nipple discharge bilaterally.  Gastrointestinal:   Abdominal Examination:  Abdomen nontender to palpation, tone normal without rigidity or guarding, no masses present, umbilicus without lesions   Liver and Spleen:  No hepatomegaly present, liver nontender to palpation    Hernias:  No hernias present  Lymphatic: Lymph Nodes:  No other lymphadenopathy present  Skin:  General Inspection:  No rashes present, no lesions present, no areas of  discoloration  Neurologic:    Mental Status:  Oriented X3.  Normal strength and tone, sensory exam                grossly normal, mentation intact and speech normal.    Psychiatric:   Mentation appears normal and " affect normal/bright.         Pelvic Exam:  External Genitalia:     Normal appearance for age, no discharge present, no tenderness present, no inflammatory lesions present, color normal  Vagina:     Normal vaginal vault without central or paravaginal defects, no discharge present, no inflammatory lesions present, no masses present  Bladder:     Nontender to palpation  Urethra:   Urethral Body:  Urethra palpation normal, urethra structural support normal   Urethral Meatus:  No erythema or lesions present  Cervix:     Appearance healthy, no lesions present, nontender to palpation, no bleeding present  Uterus:     Uterus: firm, normal sized and nontender, anteverted in position.   Adnexa:     No adnexal tenderness present, no adnexal masses present  Perineum:     Perineum within normal limits, no evidence of trauma, no rashes or skin lesions present  Anus:     Anus within normal limits, no hemorrhoids present  Inguinal Lymph Nodes:     No lymphadenopathy present  Pubic Hair:     Normal pubic hair distribution for age  Genitalia and Groin:     No rashes present, no lesions present, no areas of discoloration, no masses present      COUNSELING:   Reviewed preventive health counseling, as reflected in patient instructions  Special attention given to:        Regular exercise       Healthy diet/nutrition       (Di)menopause management    BMI: Body mass index is 37.11 kg/m .  Weight management plan: Discussed healthy diet and exercise guidelines Patient was referred to their PCP to discuss a diet and exercise plan.    ASSESSMENT:  47 year old female with satisfactory annual exam.    ICD-10-CM    1. Encounter for gynecological examination without abnormal finding  Z01.419 Surgical pathology exam     ENDOMETRIAL BIOPSY W/O CERVICAL DILATION   2. Metrorrhagia  N92.1 US Transvaginal Pelvic Non-OB       PLAN:  Patient Instructions   Follow up with your primary care provider for your other medical problems.  Continue self breast  exam.  Increase physical activity and exercise.  Lab results will be called to the patient.  Usual safety and preventative measures counseling done.  BMI >25  Weight loss encouraged.  Last pap smear (2021) was normal and negative for the DNA of high risk HPV subtypes.  No pap was obtained this year.  This was discussed with the patient and she agrees with the plan.  Long discussion today regarding metrorrhagia or frequent periods.  Discussed likely hormonal in nature based on age and schedule/nature of bleeding.  Discussed hormonal options to help regulate periods vs hysterectomy as she has already tried david ablation.  Interested in pursuing definitive management with hysterectomy.  Endometrial biopsy performed today and I will call with results next week.  Will set up pelvic ultrasound and plan for likely LASH vs LAVH and salpingectomy moving forward.  Will need to discuss management of cervix given hx of focal JOANNA II in 2010.      Daniella Duncan MD  INDICATIONS:                                                    Is a pregnancy test required: No.  Was a consent obtained?  Yes    Having endometrial biopsy for abnormal uterine bleeding    Today's PHQ-2 Score:   PHQ-2 ( 1999 [employer]) 7/27/2022   Q1: Little interest or pleasure in doing things 0   Q2: Feeling down, depressed or hopeless 0   PHQ-2 Score 0   PHQ-2 Total Score (12-17 Years)- Positive if 3 or more points; Administer PHQ-A if positive -   Q1: Little interest or pleasure in doing things Not at all   Q2: Feeling down, depressed or hopeless Not at all   PHQ-2 Score 0       PROCEDURE;                                                      A speculum was placed in the vagina and cervix prepped with betadine. A tenaculum was attached to the cervix. A small plastic 5 mm Pipelle syringe curette was inserted into the cervical canal. The uterus was sounded to 7 cm's. A vigorous four quadrant biopsy was performed, removing amount scant  of tissue. The speculum was  removed. This tissue was placed in Formalin and sent to pathology.    The patient tolerated the procedure  fairly well and she reported there was  cramping.      POST PROCEDURE;                                                      There  was no cramping at the time of discharge. She  tolerated the procedure well. There were no complications. Patient was discharged in stable condition.    Patient advised to call the clinic if severe pelvic pain, fever or heavy bleeding.    Daniella Duncan MD

## 2022-07-29 ENCOUNTER — ANCILLARY PROCEDURE (OUTPATIENT)
Dept: MAMMOGRAPHY | Facility: CLINIC | Age: 47
End: 2022-07-29
Payer: COMMERCIAL

## 2022-07-29 ENCOUNTER — OFFICE VISIT (OUTPATIENT)
Dept: OBGYN | Facility: CLINIC | Age: 47
End: 2022-07-29

## 2022-07-29 VITALS
WEIGHT: 223 LBS | HEIGHT: 65 IN | BODY MASS INDEX: 37.15 KG/M2 | DIASTOLIC BLOOD PRESSURE: 78 MMHG | SYSTOLIC BLOOD PRESSURE: 122 MMHG

## 2022-07-29 DIAGNOSIS — Z01.419 ENCOUNTER FOR GYNECOLOGICAL EXAMINATION WITHOUT ABNORMAL FINDING: Primary | ICD-10-CM

## 2022-07-29 DIAGNOSIS — N92.1 METRORRHAGIA: ICD-10-CM

## 2022-07-29 DIAGNOSIS — Z12.31 VISIT FOR SCREENING MAMMOGRAM: ICD-10-CM

## 2022-07-29 PROCEDURE — 77067 SCR MAMMO BI INCL CAD: CPT | Mod: TC | Performed by: RADIOLOGY

## 2022-07-29 PROCEDURE — 99213 OFFICE O/P EST LOW 20 MIN: CPT | Mod: 25 | Performed by: OBSTETRICS & GYNECOLOGY

## 2022-07-29 PROCEDURE — 58100 BIOPSY OF UTERUS LINING: CPT | Performed by: OBSTETRICS & GYNECOLOGY

## 2022-07-29 PROCEDURE — 88305 TISSUE EXAM BY PATHOLOGIST: CPT | Performed by: PATHOLOGY

## 2022-07-29 PROCEDURE — 77063 BREAST TOMOSYNTHESIS BI: CPT | Mod: TC | Performed by: RADIOLOGY

## 2022-07-29 PROCEDURE — 99396 PREV VISIT EST AGE 40-64: CPT | Mod: 25 | Performed by: OBSTETRICS & GYNECOLOGY

## 2022-07-29 ASSESSMENT — ANXIETY QUESTIONNAIRES
GAD7 TOTAL SCORE: 0
6. BECOMING EASILY ANNOYED OR IRRITABLE: NOT AT ALL
7. FEELING AFRAID AS IF SOMETHING AWFUL MIGHT HAPPEN: NOT AT ALL
3. WORRYING TOO MUCH ABOUT DIFFERENT THINGS: NOT AT ALL
2. NOT BEING ABLE TO STOP OR CONTROL WORRYING: NOT AT ALL
GAD7 TOTAL SCORE: 0
1. FEELING NERVOUS, ANXIOUS, OR ON EDGE: NOT AT ALL
IF YOU CHECKED OFF ANY PROBLEMS ON THIS QUESTIONNAIRE, HOW DIFFICULT HAVE THESE PROBLEMS MADE IT FOR YOU TO DO YOUR WORK, TAKE CARE OF THINGS AT HOME, OR GET ALONG WITH OTHER PEOPLE: NOT DIFFICULT AT ALL
5. BEING SO RESTLESS THAT IT IS HARD TO SIT STILL: NOT AT ALL

## 2022-07-29 ASSESSMENT — PATIENT HEALTH QUESTIONNAIRE - PHQ9
5. POOR APPETITE OR OVEREATING: NOT AT ALL
SUM OF ALL RESPONSES TO PHQ QUESTIONS 1-9: 0

## 2022-07-29 NOTE — PATIENT INSTRUCTIONS
Follow up with your primary care provider for your other medical problems.  Continue self breast exam.  Increase physical activity and exercise.  Lab results will be called to the patient.  Usual safety and preventative measures counseling done.  BMI >25  Weight loss encouraged.  Last pap smear (2021) was normal and negative for the DNA of high risk HPV subtypes.  No pap was obtained this year.  This was discussed with the patient and she agrees with the plan.  Long discussion today regarding metrorrhagia or frequent periods.  Discussed likely hormonal in nature based on age and schedule/nature of bleeding.  Discussed hormonal options to help regulate periods vs hysterectomy as she has already tried david ablation.  Interested in pursuing definitive management with hysterectomy.  Endometrial biopsy performed today and I will call with results next week.  Will set up pelvic ultrasound and plan for likely LASH vs LAVH and salpingectomy moving forward.  Will need to discuss management of cervix given hx of focal JOANNA II in 2010.

## 2022-08-02 LAB
PATH REPORT.COMMENTS IMP SPEC: NORMAL
PATH REPORT.COMMENTS IMP SPEC: NORMAL
PATH REPORT.FINAL DX SPEC: NORMAL
PATH REPORT.GROSS SPEC: NORMAL
PATH REPORT.MICROSCOPIC SPEC OTHER STN: NORMAL
PATH REPORT.RELEVANT HX SPEC: NORMAL
PHOTO IMAGE: NORMAL

## 2022-08-12 NOTE — PROGRESS NOTES
SUBJECTIVE:                                                   Teresa Brian is a 47 year old female who presents to clinic today for the following health issue(s):  Patient presents with:  Ultrasound: Discuss ultrasound and  LASH vs LAVH and salpingectomy moving forward      HPI:  Here today for pelvic ultrasound and discussion regarding definitive surgical management to help with irregular and heavy bleeding.  As discussed at late July yearly exam, Zenobia has been having periods every 2 weeks over the last several months with heavy 1-2d bleeding and then quick taper.  Had david ablation with me in 2019 without any improvement.   Has tried several hormonal options in the past --had done well on depo but doesn't want to restart due to side effects of weight gain; expelled an IUD after 2yrs; didn't like taking daily OCP, etc.  Interested in hysterectomy so us today to help with planning  Nulliparous  Hx JOANNA II on colpo biopsy in  with negative LEEP and normal pap smears since that time  US today shows normal small uterus with normal bilateral ovaries  Endometrial biopsy -->proliferative endo and benign squamous/endocervical epithelium  -remote hx laparoscopy as a teen; no other abdominal surgeries  -biggest concerns are with sexual side effects and recovery    Patient's last menstrual period was 2022 (exact date)..     Patient is sexually active, .  Using vasectomy for contraception.    reports that she has never smoked. She has never used smokeless tobacco.    STD testing offered?  Declined    Health maintenance updated:  yes    Today's PHQ-2 Score:   PHQ-2 (  [employer]) 2022   Q1: Little interest or pleasure in doing things 0   Q2: Feeling down, depressed or hopeless 0   PHQ-2 Score 0   PHQ-2 Total Score (12-17 Years)- Positive if 3 or more points; Administer PHQ-A if positive -   Q1: Little interest or pleasure in doing things Not at all   Q2: Feeling down, depressed or hopeless  Not at all   PHQ-2 Score 0       Problem list and histories reviewed & adjusted, as indicated.  Additional history: as documented.    Patient Active Problem List   Diagnosis     CARDIOVASCULAR SCREENING; LDL GOAL LESS THAN 160     Menorrhagia     History of cervical dysplasia     Migraine with aura and without status migrainosus, not intractable     Non morbid obesity due to excess calories     Family history of Parkinson disease - Father early onset around age 30-40's     S/P ACL reconstruction     Epigastric pain- chronic intermittent since 4/2017. Extensive normal work-up: hepatic/celiac panels, lipase, TSH, CBC, CRP/ESR, EGD, colonoscopy, HIDA scan, CT abdomen.Per GI may be functional origin      H/O LEEP     Past Surgical History:   Procedure Laterality Date     ARTHROSCOPIC RECONSTRUCTION ANTERIOR CRUCIATE LIGAMENT Right 10/2010    Reconstruction (ACL, MCL) and fibular fx     CATARACT IOL, RT/LT      5/2017, 9/2017     COLONOSCOPY       COLONOSCOPY N/A 6/27/2017    Procedure: COLONOSCOPY;;  Surgeon: Allan Olivier MD;  Location:  GI     COLPOSCOPY CERVIX, LOOP ELECTRODE BIOPSY, COMBINED  08/2011    squamous metaplasia     COMBINED DILATION AND CURETTAGE, ABLATE ENDOMETRIUM FELICIA N/A 5/2/2019    Procedure: FELICIA ABLATION;  Surgeon: Daniella Duncan MD;  Location:  OR     ESOPHAGOSCOPY, GASTROSCOPY, DUODENOSCOPY (EGD), COMBINED       ESOPHAGOSCOPY, GASTROSCOPY, DUODENOSCOPY (EGD), COMBINED N/A 6/27/2017    Procedure: COMBINED ESOPHAGOSCOPY, GASTROSCOPY, DUODENOSCOPY (EGD), BIOPSY SINGLE OR MULTIPLE;  ESOPHAGOSCOPY, GASTROSCOPY, DUODENOSCOPY (EGD with biopsies by cold forcep, Colonoscopy with biopsies by cold forcep.;  Surgeon: Allan Olivier MD;  Location:  GI     HC TOOTH EXTRACTION W/FORCEP  2000    wisdom teeth extraction     LAPAROSCOPY DIAGNOSTIC (GYN)  1992    negative (Aliabadi)     OPERATIVE HYSTEROSCOPY N/A 5/2/2019    Procedure: HYSTEROSCOPY;  Surgeon: Daniella Duncan MD;   Location: [facility] OR     SHOULDER SURGERY  12/30/2019    Left shoulder      Social History     Tobacco Use     Smoking status: Never Smoker     Smokeless tobacco: Never Used   Substance Use Topics     Alcohol use: Yes     Alcohol/week: 2.0 standard drinks     Comment: 2 drinks per week avg      Problem (# of Occurrences) Relation (Name,Age of Onset)    Genetic Disorder (2) Mother (Chance): Fibromyalgia, Father (Keyur): Parkinsons, Brain Anuerism    Neurologic Disorder (2) Mother (Chance): Fibromyalgia. migraines, Father (Keyur): parkinson with early dementia    No Known Problems (7) Maternal Grandmother, Maternal Grandfather, Paternal Grandmother, Paternal Grandfather, Sister, Brother, Other    Other Cancer (1) Sister (Tania): Skin       Negative family history of: Colon Cancer, Thyroid Disease, Diabetes, Hypertension, Hyperlipidemia, Breast Cancer, Skin Cancer            Current Outpatient Medications   Medication Sig     acetaminophen (TYLENOL) 500 MG tablet  (Patient not taking: Reported on 8/16/2022)     ibuprofen (ADVIL/MOTRIN) 200 MG tablet  (Patient not taking: No sig reported)     No current facility-administered medications for this visit.     Allergies   Allergen Reactions     Vicodin [Hydrocodone-Acetaminophen] Nausea and Vomiting       ROS:  12 point review of systems negative other than symptoms noted below or in the HPI.  No urinary frequency or dysuria, bladder or kidney problems, POSITIVE for:, irregular menses      OBJECTIVE:     /80   Wt 103.4 kg (228 lb)   LMP 07/14/2022 (Exact Date)   Breastfeeding No   BMI 37.94 kg/m    Body mass index is 37.94 kg/m .    Exam:  Constitutional:  Appearance: Well nourished, well developed alert, in no acute distress  Chest:  Respiratory Effort:  Breathing unlabored. Clear to auscultation bilaterally.   Cardiovascular: Heart: Auscultation:  Regular rate, normal rhythm, no murmurs present  Gastrointestinal:  Abdominal Examination:  Abdomen nontender to  palpation, tone normal without rigidity or guarding, no masses present, umbilicus without lesions; Liver/Spleen:  No hepatomegaly present, liver nontender to palpation; Hernias:  No hernias present  Skin: General Inspection:  No rashes present, no lesions present, no areas of discoloration.  Neurologic:  Mental Status:  Oriented X3.  Normal strength and tone, sensory exam grossly normal, mentation intact and speech normal.    Psychiatric:  Mentation appears normal and affect normal/bright.     In-Clinic Test Results:  Results for orders placed or performed in visit on 08/16/22 (from the past 24 hour(s))   US Transvaginal Pelvic Non-OB    Narrative    Gynecological Ultrasound Report  Pelvic U/S - Transvaginal  [facility]  Referring Provider: Dr. Daniella Duncan  Sonographer:  Heide Baron RDMS  Indication: Bleeding/Menses- Metrorrhagia (irregular menses)  LMP: 08/11/22  History:   Gynecological Ultrasonography:   Uterus: anteverted. Contour is smooth/regular.  Size: 5.53 x 4.45 x 3.69 cm  Endometrium: Thickness Total 5.30 mm  Findings: Normal  Right Ovary: 1.61 x 1.42 x 1.11 cm. Wnl  Left Ovary: 3.79 x 2.80 x 2.75 cm. Wnl  Cul de Sac Free Fluid: No free fluid     Impression:         The uterus and ovaries were visualized and no abnormalities were seen.  The endometrium appeared normal.    Daniella Duncan MD         ASSESSMENT/PLAN:                                                        ICD-10-CM    1. Metrorrhagia  N92.1    2. History of cervical dysplasia  Z87.410        Patient Instructions   Ultrasound images reviewed today with Zenobia.  No abnormalities or concerns with uterus or ovaries.  With recent history of frequent and heavier periods, again revisited idea of surgical management with hysterectomy.  Given her small uterus, I do feel that LAVH/bilateral salpingectomy is reasonable in spite of nulliparous status.  Discussed remote history of JOANNA II and recommendation for removal of  cervix as opposed to LASH.  Discussed removal of bilateral fallopian tubes and leaving ovaries in place based on age and normal ultrasound appearance.  Discussed one night observation and restrictions/limitations following surgery including no heavy lifting or vigorous exericse x 2 weeks, pelvic rest x 4-6 weeks, etc.  ERAS bag given today.  Will need pre-op visit with PCP if surgery scheduled more than one month out.      Daniella Duncan MD  [facility] [place]

## 2022-08-12 NOTE — H&P (VIEW-ONLY)
SUBJECTIVE:                                                   Teresa Brian is a 47 year old female who presents to clinic today for the following health issue(s):  Patient presents with:  Ultrasound: Discuss ultrasound and  LASH vs LAVH and salpingectomy moving forward      HPI:  Here today for pelvic ultrasound and discussion regarding definitive surgical management to help with irregular and heavy bleeding.  As discussed at late July yearly exam, Zenobia has been having periods every 2 weeks over the last several months with heavy 1-2d bleeding and then quick taper.  Had david ablation with me in 2019 without any improvement.   Has tried several hormonal options in the past --had done well on depo but doesn't want to restart due to side effects of weight gain; expelled an IUD after 2yrs; didn't like taking daily OCP, etc.  Interested in hysterectomy so us today to help with planning  Nulliparous  Hx JOANNA II on colpo biopsy in  with negative LEEP and normal pap smears since that time  US today shows normal small uterus with normal bilateral ovaries  Endometrial biopsy -->proliferative endo and benign squamous/endocervical epithelium  -remote hx laparoscopy as a teen; no other abdominal surgeries  -biggest concerns are with sexual side effects and recovery    Patient's last menstrual period was 2022 (exact date)..     Patient is sexually active, .  Using vasectomy for contraception.    reports that she has never smoked. She has never used smokeless tobacco.    STD testing offered?  Declined    Health maintenance updated:  yes    Today's PHQ-2 Score:   PHQ-2 (  [employer]) 2022   Q1: Little interest or pleasure in doing things 0   Q2: Feeling down, depressed or hopeless 0   PHQ-2 Score 0   PHQ-2 Total Score (12-17 Years)- Positive if 3 or more points; Administer PHQ-A if positive -   Q1: Little interest or pleasure in doing things Not at all   Q2: Feeling down, depressed or hopeless  Not at all   PHQ-2 Score 0       Problem list and histories reviewed & adjusted, as indicated.  Additional history: as documented.    Patient Active Problem List   Diagnosis     CARDIOVASCULAR SCREENING; LDL GOAL LESS THAN 160     Menorrhagia     History of cervical dysplasia     Migraine with aura and without status migrainosus, not intractable     Non morbid obesity due to excess calories     Family history of Parkinson disease - Father early onset around age 30-40's     S/P ACL reconstruction     Epigastric pain- chronic intermittent since 4/2017. Extensive normal work-up: hepatic/celiac panels, lipase, TSH, CBC, CRP/ESR, EGD, colonoscopy, HIDA scan, CT abdomen.Per GI may be functional origin      H/O LEEP     Past Surgical History:   Procedure Laterality Date     ARTHROSCOPIC RECONSTRUCTION ANTERIOR CRUCIATE LIGAMENT Right 10/2010    Reconstruction (ACL, MCL) and fibular fx     CATARACT IOL, RT/LT      5/2017, 9/2017     COLONOSCOPY       COLONOSCOPY N/A 6/27/2017    Procedure: COLONOSCOPY;;  Surgeon: Allan Olivier MD;  Location:  GI     COLPOSCOPY CERVIX, LOOP ELECTRODE BIOPSY, COMBINED  08/2011    squamous metaplasia     COMBINED DILATION AND CURETTAGE, ABLATE ENDOMETRIUM FELICIA N/A 5/2/2019    Procedure: FELICIA ABLATION;  Surgeon: Daniella Duncan MD;  Location:  OR     ESOPHAGOSCOPY, GASTROSCOPY, DUODENOSCOPY (EGD), COMBINED       ESOPHAGOSCOPY, GASTROSCOPY, DUODENOSCOPY (EGD), COMBINED N/A 6/27/2017    Procedure: COMBINED ESOPHAGOSCOPY, GASTROSCOPY, DUODENOSCOPY (EGD), BIOPSY SINGLE OR MULTIPLE;  ESOPHAGOSCOPY, GASTROSCOPY, DUODENOSCOPY (EGD with biopsies by cold forcep, Colonoscopy with biopsies by cold forcep.;  Surgeon: Allan Olivier MD;  Location:  GI     HC TOOTH EXTRACTION W/FORCEP  2000    wisdom teeth extraction     LAPAROSCOPY DIAGNOSTIC (GYN)  1992    negative (Aliabadi)     OPERATIVE HYSTEROSCOPY N/A 5/2/2019    Procedure: HYSTEROSCOPY;  Surgeon: Daniella Duncan MD;   Location: [facility] OR     SHOULDER SURGERY  12/30/2019    Left shoulder      Social History     Tobacco Use     Smoking status: Never Smoker     Smokeless tobacco: Never Used   Substance Use Topics     Alcohol use: Yes     Alcohol/week: 2.0 standard drinks     Comment: 2 drinks per week avg      Problem (# of Occurrences) Relation (Name,Age of Onset)    Genetic Disorder (2) Mother (Chance): Fibromyalgia, Father (Keyur): Parkinsons, Brain Anuerism    Neurologic Disorder (2) Mother (Chance): Fibromyalgia. migraines, Father (Keyur): parkinson with early dementia    No Known Problems (7) Maternal Grandmother, Maternal Grandfather, Paternal Grandmother, Paternal Grandfather, Sister, Brother, Other    Other Cancer (1) Sister (Tania): Skin       Negative family history of: Colon Cancer, Thyroid Disease, Diabetes, Hypertension, Hyperlipidemia, Breast Cancer, Skin Cancer            Current Outpatient Medications   Medication Sig     acetaminophen (TYLENOL) 500 MG tablet  (Patient not taking: Reported on 8/16/2022)     ibuprofen (ADVIL/MOTRIN) 200 MG tablet  (Patient not taking: No sig reported)     No current facility-administered medications for this visit.     Allergies   Allergen Reactions     Vicodin [Hydrocodone-Acetaminophen] Nausea and Vomiting       ROS:  12 point review of systems negative other than symptoms noted below or in the HPI.  No urinary frequency or dysuria, bladder or kidney problems, POSITIVE for:, irregular menses      OBJECTIVE:     /80   Wt 103.4 kg (228 lb)   LMP 07/14/2022 (Exact Date)   Breastfeeding No   BMI 37.94 kg/m    Body mass index is 37.94 kg/m .    Exam:  Constitutional:  Appearance: Well nourished, well developed alert, in no acute distress  Chest:  Respiratory Effort:  Breathing unlabored. Clear to auscultation bilaterally.   Cardiovascular: Heart: Auscultation:  Regular rate, normal rhythm, no murmurs present  Gastrointestinal:  Abdominal Examination:  Abdomen nontender to  palpation, tone normal without rigidity or guarding, no masses present, umbilicus without lesions; Liver/Spleen:  No hepatomegaly present, liver nontender to palpation; Hernias:  No hernias present  Skin: General Inspection:  No rashes present, no lesions present, no areas of discoloration.  Neurologic:  Mental Status:  Oriented X3.  Normal strength and tone, sensory exam grossly normal, mentation intact and speech normal.    Psychiatric:  Mentation appears normal and affect normal/bright.     In-Clinic Test Results:  Results for orders placed or performed in visit on 08/16/22 (from the past 24 hour(s))   US Transvaginal Pelvic Non-OB    Narrative    Gynecological Ultrasound Report  Pelvic U/S - Transvaginal  [facility]  Referring Provider: Dr. Daniella Duncan  Sonographer:  Heide Baron RDMS  Indication: Bleeding/Menses- Metrorrhagia (irregular menses)  LMP: 08/11/22  History:   Gynecological Ultrasonography:   Uterus: anteverted. Contour is smooth/regular.  Size: 5.53 x 4.45 x 3.69 cm  Endometrium: Thickness Total 5.30 mm  Findings: Normal  Right Ovary: 1.61 x 1.42 x 1.11 cm. Wnl  Left Ovary: 3.79 x 2.80 x 2.75 cm. Wnl  Cul de Sac Free Fluid: No free fluid     Impression:         The uterus and ovaries were visualized and no abnormalities were seen.  The endometrium appeared normal.    Daniella Duncan MD         ASSESSMENT/PLAN:                                                        ICD-10-CM    1. Metrorrhagia  N92.1    2. History of cervical dysplasia  Z87.410        Patient Instructions   Ultrasound images reviewed today with Zenobia.  No abnormalities or concerns with uterus or ovaries.  With recent history of frequent and heavier periods, again revisited idea of surgical management with hysterectomy.  Given her small uterus, I do feel that LAVH/bilateral salpingectomy is reasonable in spite of nulliparous status.  Discussed remote history of JOANNA II and recommendation for removal of  cervix as opposed to LASH.  Discussed removal of bilateral fallopian tubes and leaving ovaries in place based on age and normal ultrasound appearance.  Discussed one night observation and restrictions/limitations following surgery including no heavy lifting or vigorous exericse x 2 weeks, pelvic rest x 4-6 weeks, etc.  ERAS bag given today.  Will need pre-op visit with PCP if surgery scheduled more than one month out.      Daniella Duncan MD  [facility] [place]

## 2022-08-16 ENCOUNTER — ANCILLARY PROCEDURE (OUTPATIENT)
Dept: ULTRASOUND IMAGING | Facility: CLINIC | Age: 47
End: 2022-08-16
Payer: COMMERCIAL

## 2022-08-16 ENCOUNTER — OFFICE VISIT (OUTPATIENT)
Dept: OBGYN | Facility: CLINIC | Age: 47
End: 2022-08-16

## 2022-08-16 ENCOUNTER — PREP FOR PROCEDURE (OUTPATIENT)
Dept: OBGYN | Facility: CLINIC | Age: 47
End: 2022-08-16

## 2022-08-16 VITALS — BODY MASS INDEX: 37.94 KG/M2 | SYSTOLIC BLOOD PRESSURE: 122 MMHG | DIASTOLIC BLOOD PRESSURE: 80 MMHG | WEIGHT: 228 LBS

## 2022-08-16 DIAGNOSIS — N92.1 MENOMETRORRHAGIA: Primary | ICD-10-CM

## 2022-08-16 DIAGNOSIS — N92.1 METRORRHAGIA: ICD-10-CM

## 2022-08-16 DIAGNOSIS — Z87.410 HISTORY OF CERVICAL DYSPLASIA: ICD-10-CM

## 2022-08-16 DIAGNOSIS — N92.1 METRORRHAGIA: Primary | ICD-10-CM

## 2022-08-16 PROCEDURE — 76830 TRANSVAGINAL US NON-OB: CPT | Performed by: OBSTETRICS & GYNECOLOGY

## 2022-08-16 PROCEDURE — 99213 OFFICE O/P EST LOW 20 MIN: CPT | Performed by: OBSTETRICS & GYNECOLOGY

## 2022-08-16 NOTE — PATIENT INSTRUCTIONS
Ultrasound images reviewed today with Zenobia.  No abnormalities or concerns with uterus or ovaries.  With recent history of frequent and heavier periods, again revisited idea of surgical management with hysterectomy.  Given her small uterus, I do feel that LAVH/bilateral salpingectomy is reasonable in spite of nulliparous status.  Discussed remote history of JOANNA II and recommendation for removal of cervix as opposed to LASH.  Discussed removal of bilateral fallopian tubes and leaving ovaries in place based on age and normal ultrasound appearance.  Discussed one night observation and restrictions/limitations following surgery including no heavy lifting or vigorous exericse x 2 weeks, pelvic rest x 4-6 weeks, etc.  ERAS bag given today.  Will need pre-op visit with PCP if surgery scheduled more than one month out.

## 2022-08-17 ENCOUNTER — TELEPHONE (OUTPATIENT)
Dept: OBGYN | Facility: CLINIC | Age: 47
End: 2022-08-17

## 2022-08-17 NOTE — TELEPHONE ENCOUNTER
Type of surgery: LAVH BS CYSTO  Location of surgery: [facility] OR  Date and time of surgery: 8/31/2022 7:30a  Surgeon: BRYAN Alford  Pre-Op Appt Date: 8/16/2022  Post-Op Appt Date: 10/7/2022 10:40a   Packet sent out: MAILED 8/17/2022  Pre-cert/Authorization completed:  TBD  Date: 8/17/2022 Arnaldo cantu/Marla    COVID TEST 8/27/2022 2p CHAD Lacey  Surgery Scheduler    CPT [id number]

## 2022-08-17 NOTE — TELEPHONE ENCOUNTER
ERAS BAG GIVEN Yes  ERAS INSTRUCTIONS EXPLAINED Yes    ASSIST: Enadeghe    H&P TO BE COMPLETED BY:   PCP  FOR H&P TO BE DONE BY SURGEON   Needs to schedule with PCP if >30d out from 8/16/22  SAME DAY/OBSERVATION/INPATIENT: OBSERVATION  EQUIPMENT: [employer]  VENDOR NEEDED AT CASE: none  IF IUD WHAT BRAND none  LABS/SPECIAL INSTRUCTIONS: none  TIME OFF WORK: 2 weeks  ESTIMATED DOCTOR TIME NEEDED IN MINUTES 120min  POST OP TO BE SCHEDULED AT 4 WEEKS AFTER SX APPOINTMENT LENGTH IN MINUTES 20

## 2022-08-27 ENCOUNTER — LAB (OUTPATIENT)
Dept: LAB | Facility: CLINIC | Age: 47
End: 2022-08-27
Payer: COMMERCIAL

## 2022-08-27 DIAGNOSIS — Z20.822 ENCOUNTER FOR LABORATORY TESTING FOR COVID-19 VIRUS: ICD-10-CM

## 2022-08-27 PROCEDURE — U0003 INFECTIOUS AGENT DETECTION BY NUCLEIC ACID (DNA OR RNA); SEVERE ACUTE RESPIRATORY SYNDROME CORONAVIRUS 2 (SARS-COV-2) (CORONAVIRUS DISEASE [COVID-19]), AMPLIFIED PROBE TECHNIQUE, MAKING USE OF HIGH THROUGHPUT TECHNOLOGIES AS DESCRIBED BY CMS-2020-01-R: HCPCS

## 2022-08-27 PROCEDURE — U0005 INFEC AGEN DETEC AMPLI PROBE: HCPCS

## 2022-08-28 LAB — SARS-COV-2 RNA RESP QL NAA+PROBE: NEGATIVE

## 2022-08-30 ENCOUNTER — ANESTHESIA EVENT (OUTPATIENT)
Dept: SURGERY | Facility: CLINIC | Age: 47
End: 2022-08-30
Payer: COMMERCIAL

## 2022-08-30 RX ORDER — CALCIUM CARBONATE 500 MG/1
12 TABLET, CHEWABLE ORAL PRN
COMMUNITY

## 2022-08-30 NOTE — PROGRESS NOTES
PTA medications updated by Medication Scribe prior to surgery via phone call with patient (last doses completed by Nurse)     Medication history sources: Patient, H&P and Patient's home med list  In the past week, patient estimated taking medication this percent of the time: Greater than 90%  Adherence assessment: N/A Not Observed    Significant changes made to the medication list:  None      Additional medication history information:   None    Medication reconciliation completed by provider prior to medication history? No    Time spent in this activity: 20 minutes    The information provided in this note is only as accurate as the sources available at the time of update(s)    Prior to Admission medications    Medication Sig Last Dose Taking? Auth Provider Long Term End Date   acetaminophen (TYLENOL) 500 MG tablet Take 500-1,000 mg by mouth every 6 hours as needed for pain  at prn Yes Reported, Patient     calcium carbonate (TUMS) 500 MG chewable tablet Take 12 chew tab by mouth as needed for heartburn  at prn Yes Reported, Patient     ibuprofen (ADVIL/MOTRIN) 200 MG tablet Take 200-400 mg by mouth every 6 hours as needed for mild pain  at prn Yes Reported, Patient       Medication history completed by:    Deniz Lan CPhT  Medication Scribe  [facility]

## 2022-08-31 ENCOUNTER — ANESTHESIA (OUTPATIENT)
Dept: SURGERY | Facility: CLINIC | Age: 47
End: 2022-08-31
Payer: COMMERCIAL

## 2022-08-31 ENCOUNTER — HOSPITAL ENCOUNTER (OUTPATIENT)
Facility: CLINIC | Age: 47
Setting detail: OBSERVATION
Discharge: HOME | End: 2022-09-01
Attending: OBSTETRICS & GYNECOLOGY | Admitting: OBSTETRICS & GYNECOLOGY
Payer: COMMERCIAL

## 2022-08-31 DIAGNOSIS — N92.1 MENOMETRORRHAGIA: Primary | ICD-10-CM

## 2022-08-31 LAB
B-HCG SERPL-ACNC: <1 IU/L (ref 0–5)
HGB BLD-MCNC: 13.7 G/DL (ref 11.7–15.7)

## 2022-08-31 PROCEDURE — 96376 TX/PRO/DX INJ SAME DRUG ADON: CPT

## 2022-08-31 PROCEDURE — 84702 CHORIONIC GONADOTROPIN TEST: CPT | Performed by: OBSTETRICS & GYNECOLOGY

## 2022-08-31 PROCEDURE — 370N000017 HC ANESTHESIA TECHNICAL FEE, PER MIN: Performed by: OBSTETRICS & GYNECOLOGY

## 2022-08-31 PROCEDURE — 999N000141 HC STATISTIC PRE-PROCEDURE NURSING ASSESSMENT: Performed by: OBSTETRICS & GYNECOLOGY

## 2022-08-31 PROCEDURE — 258N000003 HC RX IP 258 OP 636: Performed by: ANESTHESIOLOGY

## 2022-08-31 PROCEDURE — 88307 TISSUE EXAM BY PATHOLOGIST: CPT | Mod: 26 | Performed by: PATHOLOGY

## 2022-08-31 PROCEDURE — 250N000011 HC RX IP 250 OP 636: Performed by: ANESTHESIOLOGY

## 2022-08-31 PROCEDURE — 96374 THER/PROPH/DIAG INJ IV PUSH: CPT

## 2022-08-31 PROCEDURE — 710N000009 HC RECOVERY PHASE 1, LEVEL 1, PER MIN: Performed by: OBSTETRICS & GYNECOLOGY

## 2022-08-31 PROCEDURE — 250N000011 HC RX IP 250 OP 636: Performed by: NURSE ANESTHETIST, CERTIFIED REGISTERED

## 2022-08-31 PROCEDURE — 250N000013 HC RX MED GY IP 250 OP 250 PS 637: Performed by: OBSTETRICS & GYNECOLOGY

## 2022-08-31 PROCEDURE — 58552 LAPARO-VAG HYST INCL T/O: CPT | Mod: 80 | Performed by: OBSTETRICS & GYNECOLOGY

## 2022-08-31 PROCEDURE — 88307 TISSUE EXAM BY PATHOLOGIST: CPT | Mod: TC | Performed by: OBSTETRICS & GYNECOLOGY

## 2022-08-31 PROCEDURE — 250N000011 HC RX IP 250 OP 636: Performed by: OBSTETRICS & GYNECOLOGY

## 2022-08-31 PROCEDURE — 250N000009 HC RX 250: Performed by: ANESTHESIOLOGY

## 2022-08-31 PROCEDURE — 85018 HEMOGLOBIN: CPT | Performed by: OBSTETRICS & GYNECOLOGY

## 2022-08-31 PROCEDURE — G0378 HOSPITAL OBSERVATION PER HR: HCPCS

## 2022-08-31 PROCEDURE — 250N000009 HC RX 250: Performed by: OBSTETRICS & GYNECOLOGY

## 2022-08-31 PROCEDURE — 250N000012 HC RX MED GY IP 250 OP 636 PS 637: Performed by: ANESTHESIOLOGY

## 2022-08-31 PROCEDURE — 250N000009 HC RX 250: Performed by: NURSE ANESTHETIST, CERTIFIED REGISTERED

## 2022-08-31 PROCEDURE — 360N000077 HC SURGERY LEVEL 4, PER MIN: Performed by: OBSTETRICS & GYNECOLOGY

## 2022-08-31 PROCEDURE — 36415 COLL VENOUS BLD VENIPUNCTURE: CPT | Performed by: OBSTETRICS & GYNECOLOGY

## 2022-08-31 PROCEDURE — 250N000025 HC SEVOFLURANE, PER MIN: Performed by: OBSTETRICS & GYNECOLOGY

## 2022-08-31 PROCEDURE — 272N000001 HC OR GENERAL SUPPLY STERILE: Performed by: OBSTETRICS & GYNECOLOGY

## 2022-08-31 PROCEDURE — 58552 LAPARO-VAG HYST INCL T/O: CPT | Performed by: OBSTETRICS & GYNECOLOGY

## 2022-08-31 PROCEDURE — 258N000003 HC RX IP 258 OP 636: Performed by: OBSTETRICS & GYNECOLOGY

## 2022-08-31 RX ORDER — ONDANSETRON 2 MG/ML
4 INJECTION INTRAMUSCULAR; INTRAVENOUS EVERY 30 MIN PRN
Status: DISCONTINUED | OUTPATIENT
Start: 2022-08-31 | End: 2022-08-31 | Stop reason: HOSPADM

## 2022-08-31 RX ORDER — LIDOCAINE HYDROCHLORIDE 20 MG/ML
INJECTION, SOLUTION INFILTRATION; PERINEURAL PRN
Status: DISCONTINUED | OUTPATIENT
Start: 2022-08-31 | End: 2022-08-31

## 2022-08-31 RX ORDER — ONDANSETRON 2 MG/ML
4 INJECTION INTRAMUSCULAR; INTRAVENOUS EVERY 6 HOURS PRN
Status: DISCONTINUED | OUTPATIENT
Start: 2022-08-31 | End: 2022-09-01 | Stop reason: HOSPADM

## 2022-08-31 RX ORDER — MAGNESIUM HYDROXIDE 1200 MG/15ML
LIQUID ORAL PRN
Status: DISCONTINUED | OUTPATIENT
Start: 2022-08-31 | End: 2022-08-31 | Stop reason: HOSPADM

## 2022-08-31 RX ORDER — SODIUM CHLORIDE, SODIUM LACTATE, POTASSIUM CHLORIDE, CALCIUM CHLORIDE 600; 310; 30; 20 MG/100ML; MG/100ML; MG/100ML; MG/100ML
INJECTION, SOLUTION INTRAVENOUS CONTINUOUS
Status: DISCONTINUED | OUTPATIENT
Start: 2022-08-31 | End: 2022-08-31 | Stop reason: HOSPADM

## 2022-08-31 RX ORDER — CEFAZOLIN SODIUM/WATER 2 G/20 ML
2 SYRINGE (ML) INTRAVENOUS SEE ADMIN INSTRUCTIONS
Status: DISCONTINUED | OUTPATIENT
Start: 2022-08-31 | End: 2022-08-31 | Stop reason: HOSPADM

## 2022-08-31 RX ORDER — HYDROMORPHONE HCL IN WATER/PF 6 MG/30 ML
0.2 PATIENT CONTROLLED ANALGESIA SYRINGE INTRAVENOUS EVERY 5 MIN PRN
Status: DISCONTINUED | OUTPATIENT
Start: 2022-08-31 | End: 2022-08-31 | Stop reason: HOSPADM

## 2022-08-31 RX ORDER — BUPIVACAINE HYDROCHLORIDE 2.5 MG/ML
INJECTION, SOLUTION EPIDURAL; INFILTRATION; INTRACAUDAL
Status: DISCONTINUED
Start: 2022-08-31 | End: 2022-08-31 | Stop reason: HOSPADM

## 2022-08-31 RX ORDER — PROPOFOL 10 MG/ML
INJECTION, EMULSION INTRAVENOUS PRN
Status: DISCONTINUED | OUTPATIENT
Start: 2022-08-31 | End: 2022-08-31

## 2022-08-31 RX ORDER — APREPITANT 40 MG/1
40 CAPSULE ORAL ONCE
Status: COMPLETED | OUTPATIENT
Start: 2022-08-31 | End: 2022-08-31

## 2022-08-31 RX ORDER — ACETAMINOPHEN 325 MG/1
650 TABLET ORAL EVERY 4 HOURS PRN
Qty: 100 TABLET | Refills: 0 | COMMUNITY
Start: 2022-08-31

## 2022-08-31 RX ORDER — SODIUM CHLORIDE, SODIUM LACTATE, POTASSIUM CHLORIDE, CALCIUM CHLORIDE 600; 310; 30; 20 MG/100ML; MG/100ML; MG/100ML; MG/100ML
INJECTION, SOLUTION INTRAVENOUS CONTINUOUS PRN
Status: DISCONTINUED | OUTPATIENT
Start: 2022-08-31 | End: 2022-08-31

## 2022-08-31 RX ORDER — NALOXONE HYDROCHLORIDE 0.4 MG/ML
0.4 INJECTION, SOLUTION INTRAMUSCULAR; INTRAVENOUS; SUBCUTANEOUS
Status: DISCONTINUED | OUTPATIENT
Start: 2022-08-31 | End: 2022-09-01 | Stop reason: HOSPADM

## 2022-08-31 RX ORDER — VASOPRESSIN 20 U/ML
INJECTION PARENTERAL
Status: DISCONTINUED
Start: 2022-08-31 | End: 2022-08-31 | Stop reason: HOSPADM

## 2022-08-31 RX ORDER — HYDROMORPHONE HCL IN WATER/PF 6 MG/30 ML
0.2 PATIENT CONTROLLED ANALGESIA SYRINGE INTRAVENOUS
Status: DISCONTINUED | OUTPATIENT
Start: 2022-08-31 | End: 2022-09-01 | Stop reason: HOSPADM

## 2022-08-31 RX ORDER — NEOSTIGMINE METHYLSULFATE 1 MG/ML
VIAL (ML) INJECTION PRN
Status: DISCONTINUED | OUTPATIENT
Start: 2022-08-31 | End: 2022-08-31

## 2022-08-31 RX ORDER — FENTANYL CITRATE 0.05 MG/ML
25 INJECTION, SOLUTION INTRAMUSCULAR; INTRAVENOUS EVERY 5 MIN PRN
Status: DISCONTINUED | OUTPATIENT
Start: 2022-08-31 | End: 2022-08-31 | Stop reason: HOSPADM

## 2022-08-31 RX ORDER — DEXAMETHASONE SODIUM PHOSPHATE 4 MG/ML
INJECTION, SOLUTION INTRA-ARTICULAR; INTRALESIONAL; INTRAMUSCULAR; INTRAVENOUS; SOFT TISSUE PRN
Status: DISCONTINUED | OUTPATIENT
Start: 2022-08-31 | End: 2022-08-31

## 2022-08-31 RX ORDER — ONDANSETRON 4 MG/1
4 TABLET, ORALLY DISINTEGRATING ORAL EVERY 6 HOURS PRN
Status: DISCONTINUED | OUTPATIENT
Start: 2022-08-31 | End: 2022-09-01 | Stop reason: HOSPADM

## 2022-08-31 RX ORDER — ACETAMINOPHEN 325 MG/1
975 TABLET ORAL ONCE
Status: COMPLETED | OUTPATIENT
Start: 2022-08-31 | End: 2022-08-31

## 2022-08-31 RX ORDER — PROPOFOL 10 MG/ML
INJECTION, EMULSION INTRAVENOUS CONTINUOUS PRN
Status: DISCONTINUED | OUTPATIENT
Start: 2022-08-31 | End: 2022-08-31

## 2022-08-31 RX ORDER — HYDROXYZINE HYDROCHLORIDE 50 MG/ML
25 INJECTION, SOLUTION INTRAMUSCULAR ONCE
Status: COMPLETED | OUTPATIENT
Start: 2022-08-31 | End: 2022-08-31

## 2022-08-31 RX ORDER — MEPERIDINE HYDROCHLORIDE 25 MG/ML
12.5 INJECTION INTRAMUSCULAR; INTRAVENOUS; SUBCUTANEOUS
Status: DISCONTINUED | OUTPATIENT
Start: 2022-08-31 | End: 2022-08-31 | Stop reason: HOSPADM

## 2022-08-31 RX ORDER — HYDROMORPHONE HYDROCHLORIDE 1 MG/ML
INJECTION, SOLUTION INTRAMUSCULAR; INTRAVENOUS; SUBCUTANEOUS PRN
Status: DISCONTINUED | OUTPATIENT
Start: 2022-08-31 | End: 2022-08-31

## 2022-08-31 RX ORDER — OXYCODONE HYDROCHLORIDE 5 MG/1
5 TABLET ORAL EVERY 4 HOURS PRN
Status: DISCONTINUED | OUTPATIENT
Start: 2022-08-31 | End: 2022-08-31 | Stop reason: HOSPADM

## 2022-08-31 RX ORDER — HYDROMORPHONE HCL IN WATER/PF 6 MG/30 ML
0.4 PATIENT CONTROLLED ANALGESIA SYRINGE INTRAVENOUS
Status: DISCONTINUED | OUTPATIENT
Start: 2022-08-31 | End: 2022-09-01 | Stop reason: HOSPADM

## 2022-08-31 RX ORDER — ONDANSETRON 4 MG/1
4-8 TABLET, ORALLY DISINTEGRATING ORAL EVERY 8 HOURS PRN
Qty: 10 TABLET | Refills: 0 | Status: SHIPPED | OUTPATIENT
Start: 2022-08-31 | End: 2022-10-07

## 2022-08-31 RX ORDER — FENTANYL CITRATE 50 UG/ML
INJECTION, SOLUTION INTRAMUSCULAR; INTRAVENOUS PRN
Status: DISCONTINUED | OUTPATIENT
Start: 2022-08-31 | End: 2022-08-31

## 2022-08-31 RX ORDER — OXYCODONE HYDROCHLORIDE 5 MG/1
10 TABLET ORAL EVERY 4 HOURS PRN
Status: DISCONTINUED | OUTPATIENT
Start: 2022-08-31 | End: 2022-09-01 | Stop reason: HOSPADM

## 2022-08-31 RX ORDER — NALOXONE HYDROCHLORIDE 0.4 MG/ML
0.2 INJECTION, SOLUTION INTRAMUSCULAR; INTRAVENOUS; SUBCUTANEOUS
Status: DISCONTINUED | OUTPATIENT
Start: 2022-08-31 | End: 2022-09-01 | Stop reason: HOSPADM

## 2022-08-31 RX ORDER — OXYCODONE HYDROCHLORIDE 5 MG/1
5 TABLET ORAL EVERY 4 HOURS PRN
Status: DISCONTINUED | OUTPATIENT
Start: 2022-08-31 | End: 2022-09-01 | Stop reason: HOSPADM

## 2022-08-31 RX ORDER — IBUPROFEN 600 MG/1
600 TABLET, FILM COATED ORAL EVERY 6 HOURS PRN
Status: DISCONTINUED | OUTPATIENT
Start: 2022-09-01 | End: 2022-09-01 | Stop reason: HOSPADM

## 2022-08-31 RX ORDER — OXYCODONE HYDROCHLORIDE 5 MG/1
5-10 TABLET ORAL
Qty: 20 TABLET | Refills: 0 | Status: SHIPPED | OUTPATIENT
Start: 2022-08-31 | End: 2022-10-07

## 2022-08-31 RX ORDER — ONDANSETRON 2 MG/ML
INJECTION INTRAMUSCULAR; INTRAVENOUS PRN
Status: DISCONTINUED | OUTPATIENT
Start: 2022-08-31 | End: 2022-08-31

## 2022-08-31 RX ORDER — KETOROLAC TROMETHAMINE 30 MG/ML
INJECTION, SOLUTION INTRAMUSCULAR; INTRAVENOUS PRN
Status: DISCONTINUED | OUTPATIENT
Start: 2022-08-31 | End: 2022-08-31

## 2022-08-31 RX ORDER — BUPIVACAINE HYDROCHLORIDE 2.5 MG/ML
INJECTION, SOLUTION INFILTRATION; PERINEURAL PRN
Status: DISCONTINUED | OUTPATIENT
Start: 2022-08-31 | End: 2022-08-31 | Stop reason: HOSPADM

## 2022-08-31 RX ORDER — FENTANYL CITRATE 0.05 MG/ML
25 INJECTION, SOLUTION INTRAMUSCULAR; INTRAVENOUS
Status: CANCELLED | OUTPATIENT
Start: 2022-08-31

## 2022-08-31 RX ORDER — CEFAZOLIN SODIUM/WATER 2 G/20 ML
2 SYRINGE (ML) INTRAVENOUS
Status: COMPLETED | OUTPATIENT
Start: 2022-08-31 | End: 2022-08-31

## 2022-08-31 RX ORDER — KETOROLAC TROMETHAMINE 15 MG/ML
15 INJECTION, SOLUTION INTRAMUSCULAR; INTRAVENOUS EVERY 6 HOURS
Status: COMPLETED | OUTPATIENT
Start: 2022-08-31 | End: 2022-09-01

## 2022-08-31 RX ORDER — LIDOCAINE 40 MG/G
CREAM TOPICAL
Status: DISCONTINUED | OUTPATIENT
Start: 2022-08-31 | End: 2022-09-01 | Stop reason: HOSPADM

## 2022-08-31 RX ORDER — GLYCOPYRROLATE 0.2 MG/ML
INJECTION, SOLUTION INTRAMUSCULAR; INTRAVENOUS PRN
Status: DISCONTINUED | OUTPATIENT
Start: 2022-08-31 | End: 2022-08-31

## 2022-08-31 RX ORDER — ONDANSETRON 4 MG/1
4 TABLET, ORALLY DISINTEGRATING ORAL EVERY 30 MIN PRN
Status: DISCONTINUED | OUTPATIENT
Start: 2022-08-31 | End: 2022-08-31 | Stop reason: HOSPADM

## 2022-08-31 RX ORDER — PHENAZOPYRIDINE HYDROCHLORIDE 200 MG/1
200 TABLET, FILM COATED ORAL ONCE
Status: COMPLETED | OUTPATIENT
Start: 2022-08-31 | End: 2022-08-31

## 2022-08-31 RX ORDER — IBUPROFEN 600 MG/1
600 TABLET, FILM COATED ORAL EVERY 6 HOURS PRN
Qty: 30 TABLET | Refills: 0 | COMMUNITY
Start: 2022-08-31

## 2022-08-31 RX ORDER — AMOXICILLIN 250 MG
1-2 CAPSULE ORAL 2 TIMES DAILY
Qty: 30 TABLET | Refills: 0 | Status: SHIPPED | OUTPATIENT
Start: 2022-08-31 | End: 2022-10-07

## 2022-08-31 RX ADMIN — Medication 2 G: at 07:44

## 2022-08-31 RX ADMIN — PROPOFOL 200 MG: 10 INJECTION, EMULSION INTRAVENOUS at 07:48

## 2022-08-31 RX ADMIN — FENTANYL CITRATE 25 MCG: 50 INJECTION, SOLUTION INTRAMUSCULAR; INTRAVENOUS at 10:29

## 2022-08-31 RX ADMIN — ROCURONIUM BROMIDE 30 MG: 50 INJECTION, SOLUTION INTRAVENOUS at 08:30

## 2022-08-31 RX ADMIN — OXYCODONE HYDROCHLORIDE 10 MG: 5 TABLET ORAL at 22:07

## 2022-08-31 RX ADMIN — KETOROLAC TROMETHAMINE 15 MG: 15 INJECTION, SOLUTION INTRAMUSCULAR; INTRAVENOUS at 17:32

## 2022-08-31 RX ADMIN — HYDROMORPHONE HYDROCHLORIDE 0.2 MG: 0.2 INJECTION, SOLUTION INTRAMUSCULAR; INTRAVENOUS; SUBCUTANEOUS at 11:29

## 2022-08-31 RX ADMIN — DEXAMETHASONE SODIUM PHOSPHATE 8 MG: 4 INJECTION, SOLUTION INTRA-ARTICULAR; INTRALESIONAL; INTRAMUSCULAR; INTRAVENOUS; SOFT TISSUE at 07:48

## 2022-08-31 RX ADMIN — GLYCOPYRROLATE 0.4 MG: 0.2 INJECTION, SOLUTION INTRAMUSCULAR; INTRAVENOUS at 10:08

## 2022-08-31 RX ADMIN — APREPITANT 40 MG: 40 CAPSULE ORAL at 06:57

## 2022-08-31 RX ADMIN — OXYCODONE HYDROCHLORIDE 10 MG: 5 TABLET ORAL at 14:48

## 2022-08-31 RX ADMIN — ROCURONIUM BROMIDE 50 MG: 50 INJECTION, SOLUTION INTRAVENOUS at 07:48

## 2022-08-31 RX ADMIN — HYDROMORPHONE HYDROCHLORIDE 0.2 MG: 0.2 INJECTION, SOLUTION INTRAMUSCULAR; INTRAVENOUS; SUBCUTANEOUS at 10:37

## 2022-08-31 RX ADMIN — KETOROLAC TROMETHAMINE 30 MG: 30 INJECTION, SOLUTION INTRAMUSCULAR at 09:55

## 2022-08-31 RX ADMIN — NEOSTIGMINE METHYLSULFATE 3 MG: 1 INJECTION, SOLUTION INTRAVENOUS at 10:08

## 2022-08-31 RX ADMIN — PHENAZOPYRIDINE HYDROCHLORIDE 200 MG: 200 TABLET ORAL at 06:57

## 2022-08-31 RX ADMIN — MIDAZOLAM 2 MG: 1 INJECTION INTRAMUSCULAR; INTRAVENOUS at 07:44

## 2022-08-31 RX ADMIN — ACETAMINOPHEN 975 MG: 325 TABLET ORAL at 06:41

## 2022-08-31 RX ADMIN — SODIUM CHLORIDE, POTASSIUM CHLORIDE, SODIUM LACTATE AND CALCIUM CHLORIDE: 600; 310; 30; 20 INJECTION, SOLUTION INTRAVENOUS at 07:44

## 2022-08-31 RX ADMIN — FENTANYL CITRATE 100 MCG: 50 INJECTION, SOLUTION INTRAMUSCULAR; INTRAVENOUS at 07:48

## 2022-08-31 RX ADMIN — ONDANSETRON 4 MG: 2 INJECTION INTRAMUSCULAR; INTRAVENOUS at 11:03

## 2022-08-31 RX ADMIN — HYDROXYZINE HYDROCHLORIDE 25 MG: 50 INJECTION, SOLUTION INTRAMUSCULAR at 10:42

## 2022-08-31 RX ADMIN — LIDOCAINE HYDROCHLORIDE 100 MG: 20 INJECTION, SOLUTION INFILTRATION; PERINEURAL at 07:48

## 2022-08-31 RX ADMIN — KETOROLAC TROMETHAMINE 15 MG: 15 INJECTION, SOLUTION INTRAMUSCULAR; INTRAVENOUS at 22:07

## 2022-08-31 RX ADMIN — PROPOFOL 30 MCG/KG/MIN: 10 INJECTION, EMULSION INTRAVENOUS at 07:53

## 2022-08-31 RX ADMIN — FENTANYL CITRATE 25 MCG: 50 INJECTION, SOLUTION INTRAMUSCULAR; INTRAVENOUS at 10:22

## 2022-08-31 RX ADMIN — HYDROMORPHONE HYDROCHLORIDE 0.5 MG: 1 INJECTION, SOLUTION INTRAMUSCULAR; INTRAVENOUS; SUBCUTANEOUS at 08:04

## 2022-08-31 RX ADMIN — SODIUM CHLORIDE, POTASSIUM CHLORIDE, SODIUM LACTATE AND CALCIUM CHLORIDE: 600; 310; 30; 20 INJECTION, SOLUTION INTRAVENOUS at 09:45

## 2022-08-31 RX ADMIN — ONDANSETRON 4 MG: 2 INJECTION INTRAMUSCULAR; INTRAVENOUS at 09:48

## 2022-08-31 RX ADMIN — HYDROMORPHONE HYDROCHLORIDE 0.2 MG: 0.2 INJECTION, SOLUTION INTRAMUSCULAR; INTRAVENOUS; SUBCUTANEOUS at 11:01

## 2022-08-31 ASSESSMENT — ENCOUNTER SYMPTOMS: SEIZURES: 0

## 2022-08-31 ASSESSMENT — ACTIVITIES OF DAILY LIVING (ADL)
ADLS_ACUITY_SCORE: 20

## 2022-08-31 ASSESSMENT — LIFESTYLE VARIABLES: TOBACCO_USE: 0

## 2022-08-31 NOTE — ANESTHESIA POSTPROCEDURE EVALUATION
Patient: Teresa Brian    Procedure: Procedure(s):  Laparoscopic assisted vaginal hysterectomy with right salpingectomy, left salpingo-oophorectomy  Diagnostic cystoscopy       Anesthesia Type:  General    Note:     Postop Pain Control: Uneventful            Sign Out: Well controlled pain   PONV: No   Neuro/Psych: Uneventful            Sign Out: Acceptable/Baseline neuro status   Airway/Respiratory: Uneventful            Sign Out: Acceptable/Baseline resp. status   CV/Hemodynamics: Uneventful            Sign Out: Acceptable CV status; No obvious hypovolemia; No obvious fluid overload   Other NRE: NONE   DID A NON-ROUTINE EVENT OCCUR? No           Last vitals:  Vitals Value Taken Time   /80 08/31/22 1200   Temp 36.1  C (97  F) 08/31/22 1200   Pulse 88 08/31/22 1208   Resp 11 08/31/22 1208   SpO2 99 % 08/31/22 1207   Vitals shown include unvalidated device data.    Electronically Signed By: Kaveh Perea MD  August 31, 2022  3:11 PM

## 2022-08-31 NOTE — BRIEF OP NOTE
[facility]    Brief Operative Note    Pre-operative diagnosis: Menometrorrhagia [N92.1]  Post-operative diagnosis menometrorrhagia, right ovary cysts    Procedure: Procedure(s):  Laparoscopic assisted vaginal hysterectomy with right salpingectomy, left salpingo-oophorectomy  Diagnostic cystoscopy  Surgeon: Surgeon(s) and Role:     * Daniella Duncan MD - Primary     * Susan Fierro MD - Assisting  Anesthesia: General   Estimated Blood Loss: 20mL    Drains: Samuel catheter  Specimens:   ID Type Source Tests Collected by Time Destination   1 : UTERUS, CERVIX, BILATERAL FALLOPIAN TUBES, LEFT OVARY Tissue Uterus, Cervix, Bilateral Fallopian Tubes & Ovaries SURGICAL PATHOLOGY EXAM Daniella Duncan MD 8/31/2022  9:13 AM      Findings:   Normal appearing uterus with possible adenomyosis based on texture and bogginess; normal right ovary and left ovary with multiple 3-5cm cysts likely hemorrhagic.  Normal cervix.  Bilateral ureteral jets noted with cystoscopy    Complications: None.  Implants: * No implants in log *    Daniella Duncan MD

## 2022-08-31 NOTE — ANESTHESIA PROCEDURE NOTES
Airway       Patient location during procedure: OR       Procedure Start/Stop Times: 8/31/2022 7:51 AM  Staff -        Anesthesiologist:  Kaveh Perea MD       CRNA: Torito So APRN CRNA       Other Anesthesia Staff: Fiona Messina       Performed By: SRNA  Consent for Airway        Urgency: elective  Indications and Patient Condition       Indications for airway management: kirsten-procedural         Mask difficulty assessment: 1 - vent by mask    Final Airway Details       Final airway type: endotracheal airway       Successful airway: ETT - single  Endotracheal Airway Details        ETT size (mm): 7.0       Successful intubation technique: direct laryngoscopy       DL Blade Type: Eugene 2       Grade View of Cords: 1       Adjucts: stylet       Position: Center       Measured from: lips       Secured at (cm): 22       Bite block used: None    Post intubation assessment        Placement verified by: capnometry, equal breath sounds and chest rise        Number of attempts at approach: 1       Number of other approaches attempted: 0       Secured with: paper tape       Ease of procedure: easy       Dentition: Intact and Unchanged    Medication(s) Administered   Medication Administration Time: 8/31/2022 7:51 AM

## 2022-08-31 NOTE — DISCHARGE INSTRUCTIONS
Today you were given 975 mg of Tylenol at 0641 on 8/31/22. The recommended daily maximum dose is 4000 mg.      While you were at the hospital today you were given a medication called Pyridium.  This is used to treat pain, burning, increased urination, and increased urge to urinate.    Pyridium will most likely darken the color of your urine to an orange or red color. Darkened urine may also cause stains to your underwear, which may or may not be removed by laundering.

## 2022-08-31 NOTE — OP NOTE
Procedure Date: 08/31/2022    PREOPERATIVE DIAGNOSIS:  Menometrorrhagia.    POSTOPERATIVE DIAGNOSES:    1.  Menometrorrhagia.  2.  Left hemorrhagic ovarian cysts.    PROCEDURE:    1.  Laparoscopic-assisted vaginal hysterectomy with bilateral salpingectomy and left oophorectomy.  2.  Diagnostic cystoscopy.    SURGEON:  Daniella Duncan MD.    ASSISTANT:  Susan Fierro MD.  Dr. Fierro's assistance was needed for visualization and retraction on both laparoscopic, as well as vaginal portion of this procedure due to patient habitus and nulliparous state.    ANESTHESIA:  General.    COMPLICATIONS:  None.    ESTIMATED BLOOD LOSS:  20 mL.    FINDINGS:  Normal-appearing uterus and bilateral fallopian tubes and right ovary.  Left ovary with four 3-4 cm, likely hemorrhagic-appearing cysts.  Otherwise, normal pelvis.    INDICATIONS FOR PROCEDURE:  Fidelia is a 47-year-old woman who was seen this summer for her yearly exam, at which time she expressed ongoing bleeding issues.  Fidelia had undergone a Jennifer ablation with me in 2019 and never obtained amenorrhea or even significant improvement in her periods.  Fidelia has been on several hormonal medications in the past and had no desire to return to these options.  Of note, not only was Fidelia's bleeding heavy, but she was also starting to have periods every 2-3 weeks.  She was therefore counseled and consented regarding management options, including definitive surgical management with hysterectomy.  Fidelia has a remote history of JOANNA-2 on colposcopic biopsy in 2010.  For this reason, decision was made to proceed with total hysterectomy, including bilateral fallopian tubes.  Our initial plan was to leave ovaries intact as long as they appeared normal.    DESCRIPTION OF PROCEDURE:  Fidelia was taken to the operating room where general anesthesia was administered without difficulty.  She was prepared and draped in the normal sterile fashion in dorsal lithotomy position.  Timeout was performed to  identify correct patient and procedure.  Open-sided speculum was placed into the vagina and anterior lip of the cervix was grasped using a single tooth tenaculum.  The [employer] uterine manipulator was then placed.  Samuel catheter was placed.  Attention was then turned to the abdomen, where an infraumbilical incision site was injected with local anesthetic.  A 5 mm incision was made and Veress needle was used to enter the peritoneal cavity.  Abdomen was insufflated with CO2 gas after an opening pressure of 5 mmHg was noted.  Following abdominal insufflation, bilateral lower quadrant ports were placed under direct visualization following injection with local anesthetic.  Once ports were in place, procedure was started.  Uterus was elevated using the uterine manipulator with findings as noted above.  Given the appearance of the left ovary with multiple ovarian cysts, decision was made to perform left oophorectomy, in addition to the surgery, given the appearance.  Right ovary was completely normal and was to remain intact.  The left round ligament was cauterized and cut using the [employer] handpiece.  The anterior leaf of the broad ligament was then elevated, and dissected down to the level of the bladder reflection.  Bladder flap was created without difficulty and easily pushed away from the cervix.  We then turned our attention to the left infundibulopelvic ligament.  This was doubly cauterized and cut.  We were able to undermine to the level of the uteroovarian artery ligament, which was cauterized and cut.  Posterior leaf of the broad ligament was dissected down to the level of the uterine artery.  The uterine artery was doubly cauterized, but not yet cut.  Attention was then turned to the right side.  Right round ligament was cauterized and cut and remainder of the bladder flap was created.  We then returned to the right fallopian tube and mesosalpinx was cauterized and cut to the level of the cornua.  Right  uteroovarian ligament was cauterized and cut.  We were able to dissect and skeletonized out the right uterine artery.  This was doubly cauterized and cut with 1 additional bite taken to minimize amount of surgery needed from the vaginal aspect.  I then to returned to the left side, where the previously cauterized uterine artery was now cut, with 1 additional bite taken medially to the cervicovaginal angle.  At this time, laparoscopic portion of the procedure was complete, and we turned our attention vaginally.  The patient was placed in high lithotomy position.  Weighted speculum was placed into the vagina and anterior lip of the cervix was regrasped with a single tooth tenaculum.  Uterine manipulator was removed and posteriorly, lip was also grasped.  Bladder reflection was easily appreciated.  Dilute vasopressin was used and injected circumferentially to aid in dissection.  The anterior vagina was then elevated off of the cervix and we were able to enter atraumatically into the peritoneal cavity.  This was expanded and right angle retractor was used.  I then turned my attention posteriorly, where the posterior vaginal wall was retracted and the peritoneal cavity also entered atraumatically.  Long weighted speculum was placed after stay suture was placed on the posterior vaginal wall.  Curved Mika clamps were then used to clamp and secure, first the right uterosacral ligament, and then the left.  Uterosacrals were securely tied and anchored to the vaginal cuff angles.  Additional bite was taken using the curved Mika of the cardinal ligament, and this was cut and ligated.  One additional bite was taken just cranial to the cardinal ligament and specimen was freed.  Uterus, cervix, bilateral fallopian tubes and left ovary were removed vaginally and intact.  At this time, all pedicles were inspected and found to be dry.  I attempted to identify the anterior peritoneum, and using a 2-0 PDS, placed a pursestring  suture to close the peritoneum.  Once this was secured and tied down, remainder of the pedicle ligaments were cut.  Vaginal cuff was then closed in a horizontal fashion using a 2-0 Vicryl in a running locked suture.  Excellent hemostasis was obtained.  At this time, diagnostic cystoscopy was performed with bilateral ureteral jets noted easily.  Samuel catheter was replaced and attention was turned for one final look laparoscopically.  The patient was returned to normal dorsal lithotomy position and abdomen was reinsufflated using CO2 gas.  Pelvic floor was found to be very hemostatic.  Surgicel powder was used to coat the raw peritoneal surfaces with excellent hemostasis.  At this time, all 3 ports were removed.  Skin incisions were reapproximated using a 4-0 Vicryl in subcuticular stitches.  Bandages were placed and Ali was taken to the recovery room in stable condition.    Daniella Duncan MD        D: 2022   T: 2022   MT: ALL    Name:     ZHANNA MCNEILFamilia  MRN:      [id number]        Account:        [id number]   :      1975           Procedure Date: 2022     Document: [id number]

## 2022-08-31 NOTE — ANESTHESIA CARE TRANSFER NOTE
Patient: Teresa Brian    Procedure: Procedure(s):  Laparoscopic assisted vaginal hysterectomy with right salpingectomy, left salpingo-oophorectomy  Diagnostic cystoscopy       Diagnosis: Menometrorrhagia [N92.1]  Diagnosis Additional Information: No value filed.    Anesthesia Type:   General     Note:    Oropharynx: oropharynx clear of all foreign objects and spontaneously breathing  Level of Consciousness: awake  Oxygen Supplementation: face mask  Level of Supplemental Oxygen (L/min / FiO2): 8 LPM  Independent Airway: airway patency satisfactory and stable  Dentition: dentition unchanged  Vital Signs Stable: post-procedure vital signs reviewed and stable  Report to RN Given: handoff report given  Patient transferred to: PACU    Handoff Report: Identifed the Patient, Identified the Reponsible Provider, Reviewed the pertinent medical history, Discussed the surgical course, Reviewed Intra-OP anesthesia mangement and issues during anesthesia, Set expectations for post-procedure period and Allowed opportunity for questions and acknowledgement of understanding      Vitals:  Vitals Value Taken Time   BP     Temp     Pulse     Resp     SpO2         Electronically Signed By: JEAN-PAUL Frazier CRNA  August 31, 2022  10:19 AM

## 2022-08-31 NOTE — INTERVAL H&P NOTE
"I have reviewed the surgical (or preoperative) H&P that is linked to this encounter, and examined the patient. There are no significant changes    Clinical Conditions Present on Arrival:  Clinically Significant Risk Factors Present on Admission                   # Obesity: Estimated body mass index is 37.06 kg/m  as calculated from the following:    Height as of this encounter: 1.651 m (5' 5\").    Weight as of this encounter: 101 kg (222 lb 11.2 oz).       "

## 2022-09-01 ENCOUNTER — NURSE TRIAGE (OUTPATIENT)
Dept: NURSING | Facility: CLINIC | Age: 47
End: 2022-09-01

## 2022-09-01 VITALS
HEART RATE: 74 BPM | HEIGHT: 65 IN | WEIGHT: 222.7 LBS | TEMPERATURE: 97.4 F | BODY MASS INDEX: 37.1 KG/M2 | RESPIRATION RATE: 16 BRPM | DIASTOLIC BLOOD PRESSURE: 78 MMHG | SYSTOLIC BLOOD PRESSURE: 130 MMHG | OXYGEN SATURATION: 98 %

## 2022-09-01 LAB
ERYTHROCYTE [DISTWIDTH] IN BLOOD BY AUTOMATED COUNT: 12.4 % (ref 10–15)
GLUCOSE BLDC GLUCOMTR-MCNC: 94 MG/DL (ref 70–99)
HCT VFR BLD AUTO: 37.7 % (ref 35–47)
HGB BLD-MCNC: 12.5 G/DL (ref 11.7–15.7)
MCH RBC QN AUTO: 30.3 PG (ref 26.5–33)
MCHC RBC AUTO-ENTMCNC: 33.2 G/DL (ref 31.5–36.5)
MCV RBC AUTO: 92 FL (ref 78–100)
PATH REPORT.COMMENTS IMP SPEC: NORMAL
PATH REPORT.COMMENTS IMP SPEC: NORMAL
PATH REPORT.FINAL DX SPEC: NORMAL
PATH REPORT.GROSS SPEC: NORMAL
PATH REPORT.MICROSCOPIC SPEC OTHER STN: NORMAL
PATH REPORT.RELEVANT HX SPEC: NORMAL
PHOTO IMAGE: NORMAL
PLATELET # BLD AUTO: 209 10E3/UL (ref 150–450)
RBC # BLD AUTO: 4.12 10E6/UL (ref 3.8–5.2)
WBC # BLD AUTO: 14.3 10E3/UL (ref 4–11)

## 2022-09-01 PROCEDURE — 96376 TX/PRO/DX INJ SAME DRUG ADON: CPT

## 2022-09-01 PROCEDURE — 36415 COLL VENOUS BLD VENIPUNCTURE: CPT | Performed by: OBSTETRICS & GYNECOLOGY

## 2022-09-01 PROCEDURE — 85014 HEMATOCRIT: CPT | Performed by: OBSTETRICS & GYNECOLOGY

## 2022-09-01 PROCEDURE — 250N000013 HC RX MED GY IP 250 OP 250 PS 637: Performed by: OBSTETRICS & GYNECOLOGY

## 2022-09-01 PROCEDURE — 96361 HYDRATE IV INFUSION ADD-ON: CPT

## 2022-09-01 PROCEDURE — G0378 HOSPITAL OBSERVATION PER HR: HCPCS

## 2022-09-01 PROCEDURE — 250N000011 HC RX IP 250 OP 636: Performed by: OBSTETRICS & GYNECOLOGY

## 2022-09-01 PROCEDURE — 82962 GLUCOSE BLOOD TEST: CPT

## 2022-09-01 RX ADMIN — KETOROLAC TROMETHAMINE 15 MG: 15 INJECTION, SOLUTION INTRAMUSCULAR; INTRAVENOUS at 09:22

## 2022-09-01 RX ADMIN — OXYCODONE HYDROCHLORIDE 10 MG: 5 TABLET ORAL at 03:35

## 2022-09-01 RX ADMIN — KETOROLAC TROMETHAMINE 15 MG: 15 INJECTION, SOLUTION INTRAMUSCULAR; INTRAVENOUS at 03:35

## 2022-09-01 RX ADMIN — OXYCODONE HYDROCHLORIDE 5 MG: 5 TABLET ORAL at 09:40

## 2022-09-01 ASSESSMENT — ACTIVITIES OF DAILY LIVING (ADL)
ADLS_ACUITY_SCORE: 20

## 2022-09-01 NOTE — PROGRESS NOTES
GYN Progress Note    Doing well.  Somewhat restless night with lots of interruptions.  Pain well controlled with oral meds.  Light vaginal bleeding.  No nausea/vomiting.  Tolerating PO.  Hydrating well.  Using bathroom well    O: AF, VSS  Gen: alert, NAD, comfortable  Abd: soft, appropriately tender  Inc: intact x 3  Ext: no edema/tenderness    Hgb: 12.5g/dL    A/P: 48yo POD#1 s/p LAVH, bilateral salpingectomy and left oophorectomy  --discharge to home today  --home rx sent  --follow up with me in 4 weeks    Daniella Duncan MD

## 2022-09-01 NOTE — PLAN OF CARE
Goal Outcome Evaluation:    Plan of Care Reviewed With: patient     Overall Patient Progress: improving     PT Aox4, VSS on RA. Ind in room, reg diet tolerating, voids well. Pain managed with PRN oxy and scheduled toradol. Lap sites covered with bandaids, CDI. Will continue to monitor.

## 2022-09-01 NOTE — PLAN OF CARE
Goal Outcome Evaluation:        Patient is alert and oriented X 4. VSS. Patient confirms pain to abdomen which was managed with PRN Oxycodone. Hypoactive bowel sounds to all quadrants. Abdominal incision with steri-strips CDI. Adequate I & O's. Tolerating regular diet and up IND. Discharge instructions reviewed and well received by patient and family. Discharged to home with family.

## 2022-09-02 ENCOUNTER — TELEPHONE (OUTPATIENT)
Dept: OBGYN | Facility: CLINIC | Age: 47
End: 2022-09-02

## 2022-09-02 NOTE — TELEPHONE ENCOUNTER
Nurse Triage    Is this a 2nd Level Triage? YES, LICENSED PRACTITIONER REVIEW IS REQUIRED    Situation: Patient calling with concerns for bleeding incision site.      Background: Pt had a lap-assisted vaginal hysterectomy on 8/31/2022. She has 3 lap sites. Her dog jumped up on her lap 2 - 3 hours ago and caused a lap site in her abdominal RLQ to start bleeding. The bleeding has since stopped, but patient isn't certain if a stitch has come out and wants to know what to do.     Assessment: Patient isn't currently bleeding. There is some suture tape with dried drainage and a Band-Aid covering the lap site. Pt is unable to see if the suture has come out.    Protocol Recommended Disposition:   Go to ED Now (Or PCP Triage)    Recommendation: Advised patient to wait for call-back after speaking with on-call provider. Care advice given. Reviewed concerning symptoms and when to call back.     Paging on call provider Dr. Susan Fierro via Smart Web @ 12:01 AM ./ Provider recommendations: If the incision isn't activiely bleeding, patient should call the clinic tomorrow morning for an incision check. If the incision bleeds and if unable to be stopped by applying direct pressure, patient would need to be seen in the ED.     Provider Recommendation Follow Up:   Reached patient/caregiver. Informed of provider's recommendations. Patient verbalized understanding and agrees with the plan.     Chloe Do RN [facility] 9/2/2022 12:08 AM    Reason for Disposition    [1] Wound gaping open after sutures (or staples) AND [2] < 48 hours since wound re-opened    Additional Information    Negative: [1] Major abdominal surgical wound AND [2] visible internal organs    Negative: Sounds like a life-threatening emergency to the triager    Negative: Surgical incision symptoms and questions    Negative: New cut and caller wonders if it needs stitches    Negative: Skin glue (Dermabond) questions    Negative: Wound looks  infected    Negative: [1] Bleeding from wound AND [2] won't stop after 10 minutes of direct pressure (using correct technique)    Protocols used: SUTURE OR STAPLE [id number]

## 2022-09-02 NOTE — TELEPHONE ENCOUNTER
"Patient is having some post op issues after her surgery. Said it feels like something \"split open\". Please rohan back.   "

## 2022-09-02 NOTE — TELEPHONE ENCOUNTER
8/31/22:   Laparoscopic assisted vaginal hysterectomy with right salpingectomy, left salpingo-oophorectomy Bilateral General   Diagnostic cystoscopy       Patient states that she was laying on the floor last evening and her dog walked across her abdomin. States her right sided laparoscopic incision started to bleed a little bit. States it went through the steri strip and onto the bandage. Bleeding did not last long before resolving.  Pain is well managed, no complaints.  Pt verifying what she needs to do. She will continue to monitor and change out the bandage to keep the area clean and dry.  Discussed when she would need to call back and how to reach us over the weekend.  Pt verbalized understanding and all questions were answered.  Julio Cain RN on 9/2/2022 at 9:41 AM

## 2022-09-06 NOTE — DISCHARGE SUMMARY
Service Date: 2022  Discharge Date: 2022    HOSPITAL COURSE:  Zenobia is a 47-year-old woman who underwent laparoscopic-assisted vaginal hysterectomy with bilateral salpingectomy and left oophorectomy for irregular menses on 2022.  Please refer to operative dictation regarding details of this procedure.  Zenobia's postoperative hospital course was overall uncomplicated.  She was admitted for observation and did well and was discharged on postoperative day #1.   Pain was well controlled using a combination of IV as well as oral medications.  Samuel catheter was discontinued on the evening of the procedure.  She was able to void without issues.  Zenobia was allowed to advance her diet with return of bowel function and was tolerating a regular diet prior to discharge home.  Zenobia showed no sign or symptoms of infection throughout postoperative hospital course and remained hemodynamically stable with a postoperative hemoglobin of 12.5 g/dL.  Zenobia was discharged to home on postoperative day #1 after meeting all the requirements for discharge.  She has verbalized understanding of discharge instructions and was given a list of contact numbers should any issues or problems arise.  Zenobia will follow up with me for routine postoperative visit in 4 weeks' time.    Daniella Duncan MD        D: 2022   T: 2022   MT: TYLER    Name:     ZHANNA MCNEIL  MRN:      [id number]        Account:      [id number]   :      1975           Service Date: 2022                                  Discharge Date: 2022     Document: [id number]

## 2022-09-07 ENCOUNTER — TELEPHONE (OUTPATIENT)
Dept: OBGYN | Facility: CLINIC | Age: 47
End: 2022-09-07

## 2022-09-07 NOTE — TELEPHONE ENCOUNTER
Nothing more to add.  Make certain no urinary symptoms.  Make certain bowels are moving and not constipated.  Otherwise agree with recommendations and precautions

## 2022-09-07 NOTE — TELEPHONE ENCOUNTER
8/31/22 DOS Laparoscopic assisted vaginal hysterectomy with right salpingectomy, left salpingo-oophorectomy    Today checked temperature 20 minutes ago and is at 99.4F. is feeling achy however feels as though related to being more sedentary post op while recovering. States generally doesn't feel well. Patient is concerned since she normally runs 97.6F.  Denies vomiting or diarrhea. stomach is crampy  however has not increased in discomfort. No increase in discomfort from incision site.last dose of ibuprofen/tylenol was yesterday afternoon. Denies any respiratory symptoms-cough, SOB/breathing issues. Denies headache. Is taking in fluids well. Good output.     Discussed temperature guidelines(100.4F)/fever recommendations.   Discussed to increase fluids, dress in light clothing, no heavy blankets. Continue to monitor temperature amd symptoms.     Will send to  to review and advise if any further recommendations    Kathe Gonzalez RN

## 2022-09-07 NOTE — TELEPHONE ENCOUNTER
Patient had surgery on 8/31 with 's. She is calling today with a low grade fever. Please call her back today

## 2022-09-07 NOTE — TELEPHONE ENCOUNTER
Denies constipation or urinary issues. Questions answered will call back with any further concerns.   Duyen Erickson RN on 9/7/2022 at 1:41 PM

## 2022-10-07 ENCOUNTER — OFFICE VISIT (OUTPATIENT)
Dept: OBGYN | Facility: CLINIC | Age: 47
End: 2022-10-07
Payer: COMMERCIAL

## 2022-10-07 VITALS — DIASTOLIC BLOOD PRESSURE: 84 MMHG | WEIGHT: 222 LBS | BODY MASS INDEX: 36.94 KG/M2 | SYSTOLIC BLOOD PRESSURE: 126 MMHG

## 2022-10-07 DIAGNOSIS — N92.1 MENOMETRORRHAGIA: ICD-10-CM

## 2022-10-07 DIAGNOSIS — Z09 POSTOPERATIVE EXAMINATION: Primary | ICD-10-CM

## 2022-10-07 DIAGNOSIS — N83.202 LEFT OVARIAN CYST: ICD-10-CM

## 2022-10-07 PROCEDURE — 99024 POSTOP FOLLOW-UP VISIT: CPT | Performed by: OBSTETRICS & GYNECOLOGY

## 2022-10-07 NOTE — PROGRESS NOTES
SUBJECTIVE:                                                   Teresa Brian is a 47 year old female who presents to clinic today for the following health issue(s):  Patient presents with:  Post-op Visit: Laparoscopic assisted vaginal hysterectomy with right salpingectomy, left salpingo-oophorectomy 22      HPI:  Here today for postoperative visit --s/p LAVH, LSO, right salpingectomy and cystoscopy on  for irregular, heavy bleeding and left ovarian cyst.  Doing great today.  Has had some ups and downs with recovery.  Has had some intermittent lower pelvic cramping/bloating.  Had a few days of watery, bloodtinged discharge last week --now resolved.  Reports a few days of fevers in the 2nd week of recovery but no focal symptoms at that time  Incisions healing well and back to most of her normal activities  So happy with results overall    Patient's last menstrual period was 08/15/2022 (approximate)..     Patient is sexually active, .  Using hysterectomy for contraception.    reports that she has never smoked. She has never used smokeless tobacco.    STD testing offered?  Declined    Health maintenance updated:  yes    Today's PHQ-2 Score:   PHQ-2 (  [employer]) 2022   Q1: Little interest or pleasure in doing things 0   Q2: Feeling down, depressed or hopeless 0   PHQ-2 Score 0   PHQ-2 Total Score (12-17 Years)- Positive if 3 or more points; Administer PHQ-A if positive -   Q1: Little interest or pleasure in doing things Not at all   Q2: Feeling down, depressed or hopeless Not at all   PHQ-2 Score 0     Problem list and histories reviewed & adjusted, as indicated.  Additional history: as documented.    Patient Active Problem List   Diagnosis     CARDIOVASCULAR SCREENING; LDL GOAL LESS THAN 160     Menorrhagia     History of cervical dysplasia     Migraine with aura and without status migrainosus, not intractable     Non morbid obesity due to excess calories     Family history of Parkinson  disease - Father early onset around age 30-40's     S/P ACL reconstruction     Epigastric pain- chronic intermittent since 4/2017. Extensive normal work-up: hepatic/celiac panels, lipase, TSH, CBC, CRP/ESR, EGD, colonoscopy, HIDA scan, CT abdomen.Per GI may be functional origin      H/O LEEP     Menometrorrhagia     Past Surgical History:   Procedure Laterality Date     ARTHROSCOPIC RECONSTRUCTION ANTERIOR CRUCIATE LIGAMENT Right 10/2010    Reconstruction (ACL, MCL) and fibular fx     CATARACT IOL, RT/LT      5/2017, 9/2017     COLONOSCOPY       COLONOSCOPY N/A 06/27/2017    Procedure: COLONOSCOPY;;  Surgeon: Allan Olivier MD;  Location: [facility]     COLPOSCOPY CERVIX, LOOP ELECTRODE BIOPSY, COMBINED  08/2011    squamous metaplasia     COMBINED DILATION AND CURETTAGE, ABLATE ENDOMETRIUM FELICIA N/A 05/02/2019    Procedure: FELICIA ABLATION;  Surgeon: Daniella Duncan MD;  Location:  OR     CYSTOSCOPY N/A 8/31/2022    Procedure: Diagnostic cystoscopy;  Surgeon: Daniella Duncan MD;  Location:  OR     ESOPHAGOSCOPY, GASTROSCOPY, DUODENOSCOPY (EGD), COMBINED       ESOPHAGOSCOPY, GASTROSCOPY, DUODENOSCOPY (EGD), COMBINED N/A 06/27/2017    Procedure: COMBINED ESOPHAGOSCOPY, GASTROSCOPY, DUODENOSCOPY (EGD), BIOPSY SINGLE OR MULTIPLE;  ESOPHAGOSCOPY, GASTROSCOPY, DUODENOSCOPY (EGD with biopsies by cold forcep, Colonoscopy with biopsies by cold forcep.;  Surgeon: Allan Olivier MD;  Location:  GI     HC TOOTH EXTRACTION W/FORCEP  2000    wisdom teeth extraction     LAPAROSCOPIC ASSISTED HYSTERECTOMY VAGINAL, BILATERAL SALPINGO-OOPHORECTOMY, COMBINED Bilateral 8/31/2022    Procedure: Laparoscopic assisted vaginal hysterectomy with right salpingectomy, left salpingo-oophorectomy;  Surgeon: Daniella Duncan MD;  Location:  OR     LAPAROSCOPY DIAGNOSTIC (GYN)  1992    negative (Aliabadi)     OPERATIVE HYSTEROSCOPY N/A 05/02/2019    Procedure: HYSTEROSCOPY;  Surgeon: Daniella Duncan MD;  Location:  OR      SHOULDER SURGERY Right 12/30/2019    right shoulder      Social History     Tobacco Use     Smoking status: Never Smoker     Smokeless tobacco: Never Used   Substance Use Topics     Alcohol use: Yes     Alcohol/week: 2.0 standard drinks     Comment: 2 drinks per week avg      Problem (# of Occurrences) Relation (Name,Age of Onset)    Genetic Disorder (2) Mother (Chance): Fibromyalgia, Father (Keyur): Parkinsons, Brain Anuerism    Neurologic Disorder (2) Mother (Chance): Fibromyalgia. migraines, Father (Keyur): parkinson with early dementia    No Known Problems (7) Maternal Grandmother, Maternal Grandfather, Paternal Grandmother, Paternal Grandfather, Sister, Brother, Other    Other Cancer (1) Sister (Tania): Skin       Negative family history of: Colon Cancer, Thyroid Disease, Diabetes, Hypertension, Hyperlipidemia, Breast Cancer, Skin Cancer            Current Outpatient Medications   Medication Sig     acetaminophen (TYLENOL) 325 MG tablet Take 2 tablets (650 mg) by mouth every 4 hours as needed for other (mild pain)     acetaminophen (TYLENOL) 500 MG tablet Take 500-1,000 mg by mouth every 6 hours as needed for pain     calcium carbonate (TUMS) 500 MG chewable tablet Take 12 chew tab by mouth as needed for heartburn     ibuprofen (ADVIL/MOTRIN) 200 MG tablet Take 200-400 mg by mouth every 6 hours as needed for mild pain     ibuprofen (ADVIL/MOTRIN) 600 MG tablet Take 1 tablet (600 mg) by mouth every 6 hours as needed (mild)     No current facility-administered medications for this visit.     Allergies   Allergen Reactions     Vicodin [Hydrocodone-Acetaminophen] Nausea and Vomiting       ROS:  12 point review of systems negative other than symptoms noted below or in the HPI.  No urinary frequency or dysuria, bladder or kidney problems      OBJECTIVE:     /84   Wt 100.7 kg (222 lb)   LMP 08/15/2022 (Approximate)   Breastfeeding No   BMI 36.94 kg/m    Body mass index is 36.94  kg/m .    Exam:  Constitutional:  Appearance: Well nourished, well developed alert, in no acute distress  Gastrointestinal:  Abdominal Examination:  Abdomen nontender to palpation, tone normal without rigidity or guarding, no masses present, umbilicus without lesions; Liver/Spleen:  No hepatomegaly present, liver nontender to palpation; Hernias:  No hernias present  Skin: General Inspection:  No rashes present, no lesions present, no areas of discoloration.  INCISIONS WELL HEALED X 3  Neurologic:  Mental Status:  Oriented X3.  Normal strength and tone, sensory exam grossly normal, mentation intact and speech normal.    Psychiatric:  Mentation appears normal and affect normal/bright.  Pelvic Exam:  External Genitalia:     Normal appearance for age, no discharge present, no tenderness present, no inflammatory lesions present, color normal  Vagina:     Normal vaginal vault without central or paravaginal defects, no discharge present, no inflammatory lesions present, no masses present; vaginal cuff well healed; no discharge or drainage noted  Bladder:     Nontender to palpation  Urethra:   Urethral Body:  Urethra palpation normal, urethra structural support normal   Urethral Meatus:  No erythema or lesions present  Cervix:     Surgically absent  Uterus:     Surgically absent  Adnexa:     Surgically absent; no right adnexal mass or tenderness  Perineum:     Perineum within normal limits, no evidence of trauma, no rashes or skin lesions present  Anus:     Anus within normal limits, no hemorrhoids present  Inguinal Lymph Nodes:     No lymphadenopathy present     In-Clinic Test Results:  No results found for this or any previous visit (from the past 24 hour(s)).    ASSESSMENT/PLAN:                                                        ICD-10-CM    1. Postoperative examination  Z09    2. Menometrorrhagia  N92.1    3. Left ovarian cyst  N83.202        Patient Instructions   May resume all normal activities.  Will return for  yearly exam with me next summer.      Daniella Duncan MD  [facility]

## 2022-10-10 ENCOUNTER — HEALTH MAINTENANCE LETTER (OUTPATIENT)
Age: 47
End: 2022-10-10

## 2023-08-01 ENCOUNTER — OFFICE VISIT (OUTPATIENT)
Dept: OBGYN | Facility: CLINIC | Age: 48
End: 2023-08-01
Payer: COMMERCIAL

## 2023-08-01 ENCOUNTER — ANCILLARY PROCEDURE (OUTPATIENT)
Dept: MAMMOGRAPHY | Facility: CLINIC | Age: 48
End: 2023-08-01
Payer: COMMERCIAL

## 2023-08-01 VITALS
HEIGHT: 65 IN | BODY MASS INDEX: 37.82 KG/M2 | WEIGHT: 227 LBS | SYSTOLIC BLOOD PRESSURE: 126 MMHG | DIASTOLIC BLOOD PRESSURE: 80 MMHG

## 2023-08-01 DIAGNOSIS — Z01.419 ENCOUNTER FOR GYNECOLOGICAL EXAMINATION WITHOUT ABNORMAL FINDING: Primary | ICD-10-CM

## 2023-08-01 DIAGNOSIS — Z12.31 VISIT FOR SCREENING MAMMOGRAM: ICD-10-CM

## 2023-08-01 PROCEDURE — 99396 PREV VISIT EST AGE 40-64: CPT | Performed by: OBSTETRICS & GYNECOLOGY

## 2023-08-01 PROCEDURE — 77063 BREAST TOMOSYNTHESIS BI: CPT | Mod: TC | Performed by: RADIOLOGY

## 2023-08-01 PROCEDURE — 77067 SCR MAMMO BI INCL CAD: CPT | Mod: TC | Performed by: RADIOLOGY

## 2023-08-01 ASSESSMENT — PATIENT HEALTH QUESTIONNAIRE - PHQ9
SUM OF ALL RESPONSES TO PHQ QUESTIONS 1-9: 0
5. POOR APPETITE OR OVEREATING: NOT AT ALL

## 2023-08-01 ASSESSMENT — ANXIETY QUESTIONNAIRES
3. WORRYING TOO MUCH ABOUT DIFFERENT THINGS: NOT AT ALL
GAD7 TOTAL SCORE: 0
7. FEELING AFRAID AS IF SOMETHING AWFUL MIGHT HAPPEN: NOT AT ALL
2. NOT BEING ABLE TO STOP OR CONTROL WORRYING: NOT AT ALL
IF YOU CHECKED OFF ANY PROBLEMS ON THIS QUESTIONNAIRE, HOW DIFFICULT HAVE THESE PROBLEMS MADE IT FOR YOU TO DO YOUR WORK, TAKE CARE OF THINGS AT HOME, OR GET ALONG WITH OTHER PEOPLE: NOT DIFFICULT AT ALL
1. FEELING NERVOUS, ANXIOUS, OR ON EDGE: NOT AT ALL
6. BECOMING EASILY ANNOYED OR IRRITABLE: NOT AT ALL
GAD7 TOTAL SCORE: 0
5. BEING SO RESTLESS THAT IT IS HARD TO SIT STILL: NOT AT ALL

## 2023-08-01 NOTE — PROGRESS NOTES
Teresa is a 48 year old  female who presents for annual exam.     Besides routine health maintenance, she has no other health concerns today .    HPI:  Here today for yearly exam --doing well.  S/p LAVH/LSO and right salpingectomy with me in August of last year for bleeding issues.  Doing well and so happy with her decision to have definitive surgery.  No vb/spotting.  +SA --no issues and feels it is even better than pre-hysterectomy.  No bowel/bladder issues.  Had some transient leakage but no issues remaining    ; works in [employer]; no children; living in [place]  -staying active with [employer], [employer], etc; not as much formal exercise but definitely busy  +mammo today; +SBE --no issues  No PCP --will look for PCP in [facility] for non GYN related issues/concerns  -up to date on colonoscopy ()  -hx JOANNA II in ; normal pap smears since      GYNECOLOGIC HISTORY:    Patient's last menstrual period was 08/15/2022 (approximate).    Her current contraception method is: vasectomy and hysterectomy.  She  reports that she has never smoked. She has never used smokeless tobacco.    Patient is sexually active.  STD testing offered?  Declined  Last PHQ-9 score on record =       2022     3:49 PM   PHQ-9 SCORE   PHQ-9 Total Score 0     Last GAD7 score on record =       2022     3:49 PM   GARRY-7 SCORE   Total Score 0     Alcohol Score = 3    HEALTH MAINTENANCE:  Cholesterol:   Cholesterol   Date Value Ref Range Status   02/15/2021 191 <200 mg/dL Final   10/23/2015 167 <200 mg/dL Final     Comment:     LDL Cholesterol is the primary guide to therapy.   The NCEP recommends further evaluation of: patients with cholesterol greater   than 200 mg/dL if additional risk factors are present, cholesterol greater   than   240 mg/dL, triglycerides greater than 150 mg/dL, or HDL less than 40 mg/dL.     Last Mammo: One year ago, Result: Normal, Next Mammo: Today    Pap:   Lab Results   Component Value Date    PAP NIL 02/15/2021    PAP NIL 2016    2/15/2021 WNL HPV(-)neg  Colonoscopy:  , Result: Normal, Next Colonoscopy: 4 more years.  Dexa:  NA    Health maintenance updated:  yes    HISTORY:  OB History    Para Term  AB Living   0 0 0 0 0 0   SAB IAB Ectopic Multiple Live Births   0 0 0 0 0       Patient Active Problem List   Diagnosis    CARDIOVASCULAR SCREENING; LDL GOAL LESS THAN 160    Menorrhagia    History of cervical dysplasia    Migraine with aura and without status migrainosus, not intractable    Non morbid obesity due to excess calories    Family history of Parkinson disease - Father early onset around age 30-40's    S/P ACL reconstruction    Epigastric pain- chronic intermittent since 2017. Extensive normal work-up: hepatic/celiac panels, lipase, TSH, CBC, CRP/ESR, EGD, colonoscopy, HIDA scan, CT abdomen.Per GI may be functional origin     H/O LEEP    Menometrorrhagia     Past Surgical History:   Procedure Laterality Date    ARTHROSCOPIC RECONSTRUCTION ANTERIOR CRUCIATE LIGAMENT Right 10/2010    Reconstruction (ACL, MCL) and fibular fx    CATARACT IOL, RT/LT      2017, 2017    COLONOSCOPY      COLONOSCOPY N/A 2017    Procedure: COLONOSCOPY;;  Surgeon: Allan Olivier MD;  Location:  GI    COLPOSCOPY CERVIX, LOOP ELECTRODE BIOPSY, COMBINED  2011    squamous metaplasia    COMBINED DILATION AND CURETTAGE, ABLATE ENDOMETRIUM FELICIA N/A 2019    Procedure: FELICIA ABLATION;  Surgeon: Daniella Duncan MD;  Location:  OR    CYSTOSCOPY N/A 2022    Procedure: Diagnostic cystoscopy;  Surgeon: Daniella Duncan MD;  Location:  OR    ESOPHAGOSCOPY, GASTROSCOPY, DUODENOSCOPY (EGD), COMBINED      ESOPHAGOSCOPY, GASTROSCOPY, DUODENOSCOPY (EGD), COMBINED N/A 2017    Procedure: COMBINED ESOPHAGOSCOPY, GASTROSCOPY, DUODENOSCOPY (EGD), BIOPSY SINGLE OR MULTIPLE;  ESOPHAGOSCOPY, GASTROSCOPY, DUODENOSCOPY (EGD with  biopsies by cold forcep, Colonoscopy with biopsies by cold forcep.;  Surgeon: Allan Olivier MD;  Location: [facility]    HC TOOTH EXTRACTION W/FORCEP  2000    wisdom teeth extraction    LAPAROSCOPIC ASSISTED HYSTERECTOMY VAGINAL, BILATERAL SALPINGO-OOPHORECTOMY, COMBINED Bilateral 8/31/2022    Procedure: Laparoscopic assisted vaginal hysterectomy with right salpingectomy, left salpingo-oophorectomy;  Surgeon: Daniella Duncan MD;  Location: [facility]    LAPAROSCOPY DIAGNOSTIC (GYN)  1992    negative (Aliabadi)    OPERATIVE HYSTEROSCOPY N/A 05/02/2019    Procedure: HYSTEROSCOPY;  Surgeon: Daniella Duncan MD;  Location: [facility] OR    SHOULDER SURGERY Right 12/30/2019    right shoulder      Social History     Tobacco Use    Smoking status: Never    Smokeless tobacco: Never   Substance Use Topics    Alcohol use: Yes     Alcohol/week: 2.0 standard drinks of alcohol     Comment: 2 drinks per week avg      Problem (# of Occurrences) Relation (Name,Age of Onset)    Genetic Disorder (2) Mother (Chance): Fibromyalgia, Father (Keyur): Parkinsons, Brain Anuerism    Neurologic Disorder (2) Mother (Chance): Fibromyalgia. migraines, Father (Keyur): parkinson with early dementia    Other Cancer (1) Sister (Tania): Skin    No Known Problems (7) Maternal Grandmother, Maternal Grandfather, Paternal Grandmother, Paternal Grandfather, Sister, Brother, Other           Negative family history of: Colon Cancer, Thyroid Disease, Diabetes, Hypertension, Hyperlipidemia, Breast Cancer, Skin Cancer              Current Outpatient Medications   Medication Sig    acetaminophen (TYLENOL) 325 MG tablet Take 2 tablets (650 mg) by mouth every 4 hours as needed for other (mild pain)    acetaminophen (TYLENOL) 500 MG tablet Take 500-1,000 mg by mouth every 6 hours as needed for pain    calcium carbonate (TUMS) 500 MG chewable tablet Take 12 chew tab by mouth as needed for heartburn    ibuprofen (ADVIL/MOTRIN) 200 MG tablet Take 200-400 mg by mouth every 6  "hours as needed for mild pain    ibuprofen (ADVIL/MOTRIN) 600 MG tablet Take 1 tablet (600 mg) by mouth every 6 hours as needed (mild)     No current facility-administered medications for this visit.     Allergies   Allergen Reactions    Vicodin [Hydrocodone-Acetaminophen] Nausea and Vomiting       Past medical, surgical, social and family histories were reviewed and updated in EPIC.    ROS:   12 point review of systems negative other than symptoms noted below or in the HPI.  Constitutional: Fatigue  No urinary frequency or dysuria, bladder or kidney problems    EXAM:  /80   Ht 1.651 m (5' 5\")   Wt 103 kg (227 lb)   LMP 08/15/2022 (Approximate)   BMI 37.77 kg/m     BMI: Body mass index is 37.77 kg/m .    PHYSICAL EXAM:  Constitutional:   Appearance: Well nourished, well developed, alert, in no acute distress  Neck:  Lymph Nodes:  No lymphadenopathy present    Thyroid:  Gland size normal, nontender, no nodules or masses present  on palpation  Chest:  Respiratory Effort:  Breathing unlabored  Cardiovascular:    Heart: Auscultation:  Regular rate, normal rhythm, no murmurs present  Breasts: Palpation of Breasts and Axillae:  No masses present on palpation, no breast tenderness., Axillary Lymph Nodes:  No lymphadenopathy present., and No nodularity, asymmetry or nipple discharge bilaterally.  Gastrointestinal:   Abdominal Examination:  Abdomen nontender to palpation, tone normal without rigidity or guarding, no masses present, umbilicus without lesions   Liver and Spleen:  No hepatomegaly present, liver nontender to palpation    Hernias:  No hernias present  Lymphatic: Lymph Nodes:  No other lymphadenopathy present  Skin:  General Inspection:  No rashes present, no lesions present, no areas of  discoloration  Neurologic:    Mental Status:  Oriented X3.  Normal strength and tone, sensory exam                grossly normal, mentation intact and speech normal.    Psychiatric:   Mentation appears normal and affect " normal/bright.         Pelvic Exam:  External Genitalia:     Normal appearance for age, no discharge present, no tenderness present, no inflammatory lesions present, color normal  Vagina:     Normal vaginal vault without central or paravaginal defects, no discharge present, no inflammatory lesions present, no masses present  Bladder:     Nontender to palpation  Urethra:   Urethral Body:  Urethra palpation normal, urethra structural support normal   Urethral Meatus:  No erythema or lesions present  Cervix:     Surgically absent  Uterus:     Surgically absent  Adnexa:     No adnexal tenderness present, no adnexal masses present  Perineum:     Perineum within normal limits, no evidence of trauma, no rashes or skin lesions present  Anus:     Anus within normal limits, no hemorrhoids present  Inguinal Lymph Nodes:     No lymphadenopathy present  Pubic Hair:     Normal pubic hair distribution for age  Genitalia and Groin:     No rashes present, no lesions present, no areas of discoloration, no masses present    COUNSELING:   Reviewed preventive health counseling, as reflected in patient instructions  Special attention given to:        Regular exercise       Healthy diet/nutrition       Colorectal Cancer Screening       (Di)menopause management    BMI: Body mass index is 37.77 kg/m .  Weight management plan: Discussed healthy diet and exercise guidelines    ASSESSMENT:  48 year old female with satisfactory annual exam.    ICD-10-CM    1. Encounter for gynecological examination without abnormal finding  Z01.419           PLAN:  Patient Instructions   Follow up with your primary care provider for your other medical problems.  Continue self breast exam.  Increase physical activity and exercise.  Usual safety and preventative measures counseling done.  BMI >25  Weight loss encouraged.  Last pap smear/pathology from hysterectomy (8/2021) was normal and negative for the DNA of high risk HPV subtypes.  No pap was obtained this  year but will repeat in 2027 due to history of moderate dysplasia in 2010.  This was discussed with the patient and she agrees with the plan.       Daniella Duncan MD

## 2023-08-01 NOTE — PATIENT INSTRUCTIONS
Follow up with your primary care provider for your other medical problems.  Continue self breast exam.  Increase physical activity and exercise.  Usual safety and preventative measures counseling done.  BMI >25  Weight loss encouraged.  Last pap smear/pathology from hysterectomy (8/2021) was normal and negative for the DNA of high risk HPV subtypes.  No pap was obtained this year but will repeat in 2027 due to history of moderate dysplasia in 2010.  This was discussed with the patient and she agrees with the plan.

## 2024-10-13 ENCOUNTER — HEALTH MAINTENANCE LETTER (OUTPATIENT)
Age: 49
End: 2024-10-13

## 2024-11-05 ENCOUNTER — OFFICE VISIT (OUTPATIENT)
Dept: FAMILY MEDICINE | Facility: CLINIC | Age: 49
End: 2024-11-05
Payer: COMMERCIAL

## 2024-11-05 VITALS
RESPIRATION RATE: 12 BRPM | SYSTOLIC BLOOD PRESSURE: 134 MMHG | WEIGHT: 229 LBS | HEIGHT: 65 IN | BODY MASS INDEX: 38.15 KG/M2 | DIASTOLIC BLOOD PRESSURE: 78 MMHG | HEART RATE: 81 BPM | OXYGEN SATURATION: 96 % | TEMPERATURE: 98.2 F

## 2024-11-05 DIAGNOSIS — M79.662 PAIN OF LEFT LOWER LEG: Primary | ICD-10-CM

## 2024-11-05 DIAGNOSIS — Z12.4 CERVICAL CANCER SCREENING: ICD-10-CM

## 2024-11-05 DIAGNOSIS — M54.50 LUMBAR BACK PAIN: ICD-10-CM

## 2024-11-05 PROBLEM — Z47.1 AFTERCARE FOLLOWING RIGHT SHOULDER JOINT REPLACEMENT SURGERY: Status: ACTIVE | Noted: 2019-12-30

## 2024-11-05 PROBLEM — M75.41 IMPINGEMENT SYNDROME OF RIGHT SHOULDER: Status: ACTIVE | Noted: 2019-09-09

## 2024-11-05 PROBLEM — E66.09 NON MORBID OBESITY DUE TO EXCESS CALORIES: Status: RESOLVED | Noted: 2017-09-07 | Resolved: 2024-11-05

## 2024-11-05 PROBLEM — Z96.611 AFTERCARE FOLLOWING RIGHT SHOULDER JOINT REPLACEMENT SURGERY: Status: ACTIVE | Noted: 2019-12-30

## 2024-11-05 PROBLEM — M25.511 RIGHT SHOULDER PAIN: Status: RESOLVED | Noted: 2019-09-09 | Resolved: 2024-11-05

## 2024-11-05 PROBLEM — G25.89 SCAPULAR DYSKINESIS: Status: ACTIVE | Noted: 2019-09-09

## 2024-11-05 PROCEDURE — 99213 OFFICE O/P EST LOW 20 MIN: CPT

## 2024-11-05 RX ORDER — MELOXICAM 15 MG/1
15 TABLET ORAL DAILY
Qty: 30 TABLET | Refills: 0 | Status: SHIPPED | OUTPATIENT
Start: 2024-11-05

## 2024-11-05 RX ORDER — CYCLOBENZAPRINE HCL 5 MG
5 TABLET ORAL 3 TIMES DAILY PRN
Qty: 30 TABLET | Refills: 0 | Status: SHIPPED | OUTPATIENT
Start: 2024-11-05

## 2024-11-05 ASSESSMENT — ANXIETY QUESTIONNAIRES
7. FEELING AFRAID AS IF SOMETHING AWFUL MIGHT HAPPEN: NOT AT ALL
5. BEING SO RESTLESS THAT IT IS HARD TO SIT STILL: NOT AT ALL
7. FEELING AFRAID AS IF SOMETHING AWFUL MIGHT HAPPEN: NOT AT ALL
6. BECOMING EASILY ANNOYED OR IRRITABLE: NOT AT ALL
4. TROUBLE RELAXING: NOT AT ALL
GAD7 TOTAL SCORE: 0
8. IF YOU CHECKED OFF ANY PROBLEMS, HOW DIFFICULT HAVE THESE MADE IT FOR YOU TO DO YOUR WORK, TAKE CARE OF THINGS AT HOME, OR GET ALONG WITH OTHER PEOPLE?: NOT DIFFICULT AT ALL
2. NOT BEING ABLE TO STOP OR CONTROL WORRYING: NOT AT ALL
GAD7 TOTAL SCORE: 0
3. WORRYING TOO MUCH ABOUT DIFFERENT THINGS: NOT AT ALL
GAD7 TOTAL SCORE: 0
1. FEELING NERVOUS, ANXIOUS, OR ON EDGE: NOT AT ALL
IF YOU CHECKED OFF ANY PROBLEMS ON THIS QUESTIONNAIRE, HOW DIFFICULT HAVE THESE PROBLEMS MADE IT FOR YOU TO DO YOUR WORK, TAKE CARE OF THINGS AT HOME, OR GET ALONG WITH OTHER PEOPLE: NOT DIFFICULT AT ALL

## 2024-11-05 ASSESSMENT — PAIN SCALES - PAIN ENJOYMENT GENERAL ACTIVITY SCALE (PEG)
PEG_TOTALSCORE: 6
INTERFERED_GENERAL_ACTIVITY: 5
AVG_PAIN_PASTWEEK: 6
PEG_TOTALSCORE: 6
INTERFERED_ENJOYMENT_LIFE: 7
INTERFERED_GENERAL_ACTIVITY: 5
INTERFERED_ENJOYMENT_LIFE: 7
AVG_PAIN_PASTWEEK: 6

## 2024-11-05 ASSESSMENT — PAIN SCALES - GENERAL: PAINLEVEL_OUTOF10: MODERATE PAIN (5)

## 2024-11-05 ASSESSMENT — PATIENT HEALTH QUESTIONNAIRE - PHQ9
10. IF YOU CHECKED OFF ANY PROBLEMS, HOW DIFFICULT HAVE THESE PROBLEMS MADE IT FOR YOU TO DO YOUR WORK, TAKE CARE OF THINGS AT HOME, OR GET ALONG WITH OTHER PEOPLE: SOMEWHAT DIFFICULT
SUM OF ALL RESPONSES TO PHQ QUESTIONS 1-9: 3
SUM OF ALL RESPONSES TO PHQ QUESTIONS 1-9: 3

## 2024-11-05 NOTE — PATIENT INSTRUCTIONS
Tylenol 1,000mg every 6 hours ( up to 3,000mg per day)     Tumeric     Ice, heat packs,        Symptoms of Menopause:   Hot flashes (common at night, but can occur several times throughout the day)  Vaginal dryness   Sleep disturbances   Mood changes   Headaches  Breast tenderness    Treatment options for Menopause:   Lower room temperatures   Try cool shower-hot showers can trigger hot flashes.   Avoid hot flash triggers: Stress, caffeine, alcohol, spicy foods, and cigarette smoke.   Use fans   Dress in layers   Apple cider vinegar - gummy    Mix 1-2 tablespoons in glass of water   Ground Flaxseed-Helps to reduce hot flashes by increasing estrogen levels   Take 1 tablespoon 2-3 times per day with lots of water or sprinkle on food  Vitamin E acts as substitute for estrogen- take 200 units with each meal or 400 units total per day  Acupuncture   Yoga/Exercise   Prescription medication   Venlafaxine 37.5-150 mg daily   Paroxetine 7.5 mg daily at bedtime or 12.5-25 mg daily   Black Cohosh 20-80 mg twice a day for 6 months   Estroven (black cohosh, soy, and calcium) 1 capsule, twice a day, for 6 months  Hormone therapy-Consult to OBGYN

## 2024-11-05 NOTE — PROGRESS NOTES
Assessment & Plan     Pain of left lower leg  Pain intermittent and nonspecific to the lateral left lower extremity. Intermittent pain to left hip, left lateral lower leg, left lateral ankle with intermittent numbness to the lateral left toes. Pain also fluctuating depending on positioning.   During exam when lying down on exam table-increased pain to lateral leg and relieved when standing up.   No sciatic pain.  Suspect root cause of pain could be from lumbar spine with radiation of pain along the L3-L5 and S1 dermatomes.   Low risk DVT in lower extremity. Nonsmoker, no bleeding or clotting disorder, no risk factors. No edema or signs DVT on exam.   Muscle tension noted lumbar region.   - meloxicam (MOBIC) 15 MG tablet  Dispense: 30 tablet; Refill: 0  - cyclobenzaprine (FLEXERIL) 5 MG tablet  Dispense: 30 tablet; Refill: 0  - Physical Therapy  Referral    Lumbar back pain  Muscle tension noted lumbar region.   - meloxicam (MOBIC) 15 MG tablet  Dispense: 30 tablet; Refill: 0  - cyclobenzaprine (FLEXERIL) 5 MG tablet  Dispense: 30 tablet; Refill: 0  - Physical Therapy  Referral  -Provided handout of lumbar back and leg stretches.   -Ice packs, warm packs, positioning to relieve discomfort   -Supportive pillows, lay on right side at night with pillow between legs to help relieve left lower extremity pain.     Cervical cancer screening  Discussed need for preventive exam and screening. Will follow up future date for appointment.     Patient verbalizes understanding and is in agreement with the plan of care. All questions and concerns addressed.         FUTURE APPOINTMENTS:  -Follow up in 1 month if symptoms do not improve or sooner if symptoms worsen.   - Follow-up for annual visit or as needed    Buffy Varela is a 49 year old, presenting for the following health issues:  Leg Pain        11/5/2024     2:51 PM   Additional Questions   Roomed by Christine Gruber CMA   Accompanied by Self     HPI      Pain History:  When did you first notice your pain? 1 month   Have you seen anyone else for your pain? No  How has your pain affected your ability to work? Pain does not limit ability to work   What type of work do you or did you do?  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   Where in your body do you have pain? Musculoskeletal problem/pain  Onset/Duration: 1 month- getting worse   Description  Location: foot, leg, and hip - left  Joint Swelling: No  Redness: No  Pain: YES  Warmth: No  Intensity:  severe  Progression of Symptoms:  Rapidly worsening  Accompanying signs and symptoms:   Fevers: No  Numbness/tingling/weakness: YES- toes feels numb and cold at times.   History  Trauma to the area: No. No previous injuries.   Recent illness:  No  Previous similar problem: No  Previous evaluation:  No  Precipitating or alleviating factors:  Aggravating factors include: walking  Therapies tried and outcome: rest/inactivity, heat, ice, and Ibuprofen  Switching off between tylenol and ibuprofen every 4 hours.   Ibuprofen 600mg every 3-4 hours.     Woke up at night and was having severe pain, tried talking ibuprofen didn't help. She usually sleeps on left side.   She slept on the right side, put pillow under left leg and helped her fall back asleep.     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 This was in August and Sept. 2024.     History of chronic low back pain, felt more muscular and related to positioning.     History of hysterectomy-reports feeling more mood changes, fatigue, weight gain. Wondering if possible perimenopause symptoms.     Denies any increased fatigue, systemic symptoms, swelling or erythema to lower extremity.   Review of Systems  Constitutional, HEENT, cardiovascular, pulmonary, gi and gu systems are negative, except as otherwise noted.      Objective    /78   Pulse 81   Temp 98.2  F (36.8  C) (Tympanic)   Resp 12   Ht 1.651 m  "(5' 5\")   Wt 103.9 kg (229 lb)   LMP 08/15/2022 (Approximate)   SpO2 96%   BMI 38.11 kg/m    Body mass index is 38.11 kg/m .  Physical Exam   GENERAL: alert and no distress  NECK: no adenopathy, no asymmetry, masses, or scars  ABDOMEN: soft, nontender, no hepatosplenomegaly, no masses   MS: normal muscle tone, normal range of motion, no edema, peripheral pulses normal, RLE exam shows normal strength and muscle mass, LLE exam shows normal strength and muscle mass, no erythema, induration, or nodules, ROM of all joints is normal, and no evidence of joint instability, and spine exam shows ROM is normal and straight  ORTHO: Hip Exam: Palpation: Tender: feels dull deep tenderness lateral side of left hip.   Range of Motion:  Full ROM, both hips  Strength:  full strength  Knee Exam: Inspection: AP/lateral alignment normal, No effusion  Nontender left knee.   Active Range of Motion: full flexion, full extension, no pain with extension, all normal  Strength: normal strength.   Also examined: hip full range of motion   Lower Leg Exam: Inspection; no swelling, no ecchymosis, no deformity  LEFT Ankle Exam:   Inspection:Swelling:none   No tenderness to palpation, deep dull ache along lateral side of left ankle.   Range of Motion:Full ROM intact   Strength:Intact   Special tests:negative Wallis sign  Lumber/Thoracic Spine Exam: Tender:  sore along L 3-S 1   Range of Motion:  Full ROM intact with tenderness bending forward.   Special tests:  negative straight leg raises bilaterally.   SKIN: no suspicious lesions or rashes  NEURO: Normal strength and tone, mentation intact and speech normal. No numbness or tingling in extremities.   PSYCH: mentation appears normal, affect normal/bright          Signed Electronically by: JEAN-PAUL Weston CNP    "

## 2024-11-18 ENCOUNTER — THERAPY VISIT (OUTPATIENT)
Dept: PHYSICAL THERAPY | Facility: CLINIC | Age: 49
End: 2024-11-18
Payer: COMMERCIAL

## 2024-11-18 DIAGNOSIS — M79.662 PAIN OF LEFT LOWER LEG: ICD-10-CM

## 2024-11-18 PROCEDURE — 97140 MANUAL THERAPY 1/> REGIONS: CPT | Mod: GP | Performed by: PHYSICAL MEDICINE & REHABILITATION

## 2024-11-18 PROCEDURE — 97110 THERAPEUTIC EXERCISES: CPT | Mod: GP | Performed by: PHYSICAL MEDICINE & REHABILITATION

## 2024-11-18 PROCEDURE — 97161 PT EVAL LOW COMPLEX 20 MIN: CPT | Mod: GP | Performed by: PHYSICAL MEDICINE & REHABILITATION

## 2024-11-18 ASSESSMENT — ACTIVITIES OF DAILY LIVING (ADL)
GETTING_INTO_OR_OUT_OF_A_CAR: A LITTLE BIT OF DIFFICULTY
RUNNING_ON_EVEN_GROUND: EXTREME DIFFICULTY OR UNABLE TO PERFORM ACTIVITY
YOUR_USUAL_HOBBIES,_RECREATIONAL_OR_SPORTING_ACTIVITIES: A LITTLE BIT OF DIFFICULTY
SQUATTING: A LITTLE BIT OF DIFFICULTY
STANDING_FOR_1_HOUR: A LITTLE BIT OF DIFFICULTY
SITTING_FOR_1_HOUR: A LITTLE BIT OF DIFFICULTY
PLEASE_INDICATE_YOR_PRIMARY_REASON_FOR_REFERRAL_TO_THERAPY:: FOOT AND/OR ANKLE
WALKING_A_MILE: A LITTLE BIT OF DIFFICULTY
ROLLING_OVER_IN_BED: A LITTLE BIT OF DIFFICULTY
ANY_OF_YOUR_USUAL_WORK,_HOUSEWORK_OR_SCHOOL_ACTIVITIES: A LITTLE BIT OF DIFFICULTY
WALKING_BETWEEN_ROOMS: A LITTLE BIT OF DIFFICULTY
LEFS_RAW_SCORE: 0
SHOPPING: EXTREME DIFFICULTY OR UNABLE TO PERFORM ACTIVITY
GOING_UP_OR_DOWN_10_STAIRS: A LITTLE BIT OF DIFFICULTY
LIFTING_AN_OBJECT,_LIKE_A_BAG_OF_GROCERIES_FROM_THE_FLOOR: A LITTLE BIT OF DIFFICULTY
MAKING_SHARP_TURNS_WHILE_RUNNING_FAST: EXTREME DIFFICULTY OR UNABLE TO PERFORM ACTIVITY
RUNNING_ON_UNEVEN_GROUND: EXTREME DIFFICULTY OR UNABLE TO PERFORM ACTIVITY
PERFORMING_HEAVY_ACTIVITIES_AROUND_YOUR_HOME: A LITTLE BIT OF DIFFICULTY
PUTTING_ON_YOUR_SHOES_OR_SOCKS: A LITTLE BIT OF DIFFICULTY
LEFS_SCORE(%): 0
WALKING_2_BLOCKS: A LITTLE BIT OF DIFFICULTY
GETTING_INTO_AND_OUT_OF_A_BATH: A LITTLE BIT OF DIFFICULTY
PERFORMING_LIGHT_ACTIVITIES_AROUND_YOUR_HOME: A LITTLE BIT OF DIFFICULTY

## 2024-11-18 NOTE — PROGRESS NOTES
"PHYSICAL THERAPY EVALUATION  Type of Visit: Evaluation     Fall Risk Screen:  Fall screen completed by: PT  Have you fallen 2 or more times in the past year?: No  Have you fallen and had an injury in the past year?: No  Is patient a fall risk?: No    Subjective       Presenting condition or subjective complaint: (Patient-Rptd) Pain in ankle and lower left leg. \"Pain intermittent and nonspecific to the lateral left lower extremity. Intermittent pain to left hip, left lateral lower leg, left lateral ankle with intermittent numbness to the lateral left toes. During exam when lying down on exam table-increased pain to lateral leg and relieved when standing up\" per referring provider.  Notes that numbness is only in her toes, pain is located at primarily knee down. At times does feel her left leg will give out intermittently when walking or doing stairs. Does not have a relieving position, lying down to sleep is worse.  Cannot recall an injury or something that she can attribute pain too. Has noticed some discoloration at anterior foot.   Date of onset: 10/15/24    Relevant medical history: (Patient-Rptd) Menopause; Migraines or headaches; Overweight   Dates & types of surgery: (Patient-Rptd) Right knee 10/10; right shoulder 12/20; hysterectomy 08/22    Prior diagnostic imaging/testing results:       Prior therapy history for the same diagnosis, illness or injury: (Patient-Rptd) No      Living Environment  Social support: (Patient-Rptd) With a significant other or spouse   Type of home: (Patient-Rptd) House; Multi-level   Stairs to enter the home: (Patient-Rptd) Yes (Patient-Rptd) 1 Is there a railing: (Patient-Rptd) No     Ramp: (Patient-Rptd) No   Stairs inside the home: (Patient-Rptd) Yes (Patient-Rptd) 14 Is there a railing: (Patient-Rptd) Yes     Help at home: (Patient-Rptd) None  Equipment owned:       Employment: (Patient-Rptd) Yes (Patient-Rptd) Office Mgr/ Accounting  Hobbies/Interests: (Patient-Rptd) " Fishing/Hunting    Patient goals for therapy: (Patient-Rptd) Everything without pain     Objective   LUMBAR SPINE EVALUATION  PAIN: Pain Level at Rest: 3/10  Pain Level with Use: 6/10  Pain Location: lumbar spine, hip, knee, and ankle  Pain Quality: Aching, Dull, and Throbbing  Pain Frequency: intermittent  Pain is Worst: daytime or nighttime  Pain is Exacerbated By: going upstairs, walking, sitting for long periods of time  Pain is Relieved By: heat, rest, and stretch  INTEGUMENTARY (edema, incisions): WFL  POSTURE: Standing Posture: Thoracic kyphosis increased  WEIGHTBEARING ALIGNMENT: Lumbar/Pelvic WB Alignment:Anterior pelvic tilt, Lordosis increased  ROM:   (Degrees) Left AROM Left PROM  Right AROM Right PROM   Hip Flexion wfl  wfl    Hip Extension       Hip Abduction       Hip Adduction       Hip Internal Rotation wfl  wfl    Hip External Rotation wfl  wfl    Knee Flexion       Knee Extension       Lumbar Side glide 20% impaired wfl   Lumbar Flexion 25% limited   Lumbar Extension wfl     STRENGTH:  L hip ER 4/5; L hip ABD 4-/5; L hip ext 4/5; core fair (+)  MYOTOMES: WNL  DERMATOMES: WNL  NEURAL TENSION:  mild tension in L sciatica  FLEXIBILITY: Decreased piriformis L  LUMBAR/HIP Special Tests:  mild positive L slump test    FUNCTIONAL TESTS: Double Leg Squat: Anterior knee translation, Knee valgus, Hip internal rotation, and Improper use of glutes/hips  PALPATION:  tenderness to L piriformis and QL muscles  SPINAL SEGMENTAL CONCLUSIONS:  FRS R L4-S1      Assessment & Plan   CLINICAL IMPRESSIONS  Medical Diagnosis: Pain of left lower leg (M79.662)  - Primary    Treatment Diagnosis: low back pain   Impression/Assessment: Patient is a 49 year old female with left leg pain complaints.  The following significant findings have been identified: Pain, Decreased ROM/flexibility, Decreased joint mobility, Decreased strength, Impaired muscle performance, and Decreased activity tolerance. These impairments interfere with  their ability to perform self care tasks, work tasks, recreational activities, household mobility, and community mobility as compared to previous level of function.     Clinical Decision Making (Complexity):  Clinical Presentation: Stable/Uncomplicated  Clinical Presentation Rationale: based on medical and personal factors listed in PT evaluation  Clinical Decision Making (Complexity): Low complexity    PLAN OF CARE  Treatment Interventions:  Modalities: E-stim, Mechanical Traction  Interventions: Manual Therapy, Neuromuscular Re-education, Therapeutic Activity, Therapeutic Exercise    Long Term Goals     PT Goal 1  Goal Identifier: STG  Goal Description: Patient to climb up/down 1 fight of stairs with <3/10 pain.  Rationale: to maximize safety and independence within the community  Target Date: 12/23/24  PT Goal 2  Goal Identifier: STG  Goal Description: Patient to bend over to  items from ground with <3/10 pain.  Rationale: to maximize safety and independence with performance of ADLs and functional tasks  Target Date: 12/23/24  PT Goal 3  Goal Identifier: LTG  Goal Description: Patient to report walking community distances and lifting/carrying moderately heavy household items with <2/10 pain.  Rationale: to maximize safety and independence with performance of ADLs and functional tasks  Target Date: 01/27/25  PT Goal 4  Goal Identifier: LTG  Goal Description: Patient to be IND with HEP to aid functional recovery and reduce future occurence of pain and/or disability.  Rationale: to maximize safety and independence with performance of ADLs and functional tasks  Target Date: 01/27/25      Frequency of Treatment: 1x/week  Duration of Treatment: 10 weeks    Education Assessment:   Learner/Method: Patient;No Barriers to Learning    Risks and benefits of evaluation/treatment have been explained.   Patient/Family/caregiver agrees with Plan of Care.     Evaluation Time:     PT Eval, Low Complexity Minutes (16870):  16     Signing Clinician: Duong Muir, PT

## 2024-11-25 NOTE — ANESTHESIA PREPROCEDURE EVALUATION
Anesthesia Pre-Procedure Evaluation    Patient: Teresa Brian   MRN: [id number] : 1975        Procedure : Procedure(s):  Laparoscopic assisted vaginal hysterectomy with bilateral salpingectomy  Diagnostic cystoscopy          Past Medical History:   Diagnosis Date     Asthma      BMI 34.0-34.9,adult 10/6/2015     CARDIOVASCULAR SCREENING; LDL GOAL LESS THAN 160      Cervical dysplasia 2010-colpo [facility]-->JOANNA 1; persistent LGSIL/ASC-US paps; colposcopy 2010 with JOANNA 1, focal JOANNA 2 and benign ECC     Condyloma acuminatum 2009    perirecal BX      Herpes simplex virus infection     HSV-1     Menarche age 15    cycles q 3-4 x 3-4 d w cramps     Menorrhagia      Migraine      Personal history of cervical dysplasia     () JOANNA I; () JOANNA I and focal JOANNA II     PONV (postoperative nausea and vomiting)       Past Surgical History:   Procedure Laterality Date     ARTHROSCOPIC RECONSTRUCTION ANTERIOR CRUCIATE LIGAMENT Right 10/2010    Reconstruction (ACL, MCL) and fibular fx     CATARACT IOL, RT/LT      2017, 2017     COLONOSCOPY       COLONOSCOPY N/A 2017    Procedure: COLONOSCOPY;;  Surgeon: Allan Olivier MD;  Location:  GI     COLPOSCOPY CERVIX, LOOP ELECTRODE BIOPSY, COMBINED  2011    squamous metaplasia     COMBINED DILATION AND CURETTAGE, ABLATE ENDOMETRIUM FELICIA N/A 2019    Procedure: FELICIA ABLATION;  Surgeon: Daniella Duncan MD;  Location:  OR     ESOPHAGOSCOPY, GASTROSCOPY, DUODENOSCOPY (EGD), COMBINED       ESOPHAGOSCOPY, GASTROSCOPY, DUODENOSCOPY (EGD), COMBINED N/A 2017    Procedure: COMBINED ESOPHAGOSCOPY, GASTROSCOPY, DUODENOSCOPY (EGD), BIOPSY SINGLE OR MULTIPLE;  ESOPHAGOSCOPY, GASTROSCOPY, DUODENOSCOPY (EGD with biopsies by cold forcep, Colonoscopy with biopsies by cold forcep.;  Surgeon: Allan Olivier MD;  Location:  GI     HC TOOTH EXTRACTION W/FORCEP      wisdom teeth extraction     LAPAROSCOPY DIAGNOSTIC (GYN)   1992    negative ([facility])     OPERATIVE HYSTEROSCOPY N/A 05/02/2019    Procedure: HYSTEROSCOPY;  Surgeon: Daniella Duncan MD;  Location: [facility]     SHOULDER SURGERY Right 12/30/2019    right shoulder      Allergies   Allergen Reactions     Vicodin [Hydrocodone-Acetaminophen] Nausea and Vomiting      Social History     Tobacco Use     Smoking status: Never Smoker     Smokeless tobacco: Never Used   Substance Use Topics     Alcohol use: Yes     Alcohol/week: 2.0 standard drinks     Comment: 2 drinks per week avg      Wt Readings from Last 1 Encounters:   08/31/22 101 kg (222 lb 11.2 oz)        Anesthesia Evaluation   Pt has had prior anesthetic.     History of anesthetic complications  - PONV.      ROS/MED HX  ENT/Pulmonary:     (+) asthma  (-) tobacco use and sleep apnea   Neurologic:     (+) migraines,  (-) no seizures and no CVA   Cardiovascular:    (-) hypertension   METS/Exercise Tolerance:     Hematologic:       Musculoskeletal:       GI/Hepatic:    (-) GERD and liver disease   Renal/Genitourinary:    (-) renal disease   Endo:     (+) Obesity,  (-) Type II DM and thyroid disease   Psychiatric/Substance Use:       Infectious Disease:       Malignancy:       Other:            Physical Exam    Airway        Mallampati: II   TM distance: > 3 FB   Neck ROM: full   Mouth opening: > 3 cm    Respiratory Devices and Support         Dental  no notable dental history         Cardiovascular   cardiovascular exam normal          Pulmonary   pulmonary exam normal                OUTSIDE LABS:  CBC:   Lab Results   Component Value Date    WBC 6.9 04/18/2022    WBC 8.7 06/14/2021    HGB 13.7 08/31/2022    HGB 12.8 04/18/2022    HCT 39.6 04/18/2022    HCT 39.6 06/14/2021     04/18/2022     06/14/2021     BMP:   Lab Results   Component Value Date     04/18/2022     02/15/2021    POTASSIUM 3.9 04/18/2022    POTASSIUM 4.0 02/15/2021    CHLORIDE 107 04/18/2022    CHLORIDE 107 02/15/2021    CO2 27  04/18/2022    CO2 28 02/15/2021    BUN 10 04/18/2022    BUN 12 02/15/2021    CR 0.68 04/18/2022    CR 0.72 02/15/2021    GLC 94 04/18/2022    GLC 79 02/15/2021     COAGS: No results found for: PTT, INR, FIBR  POC:   Lab Results   Component Value Date    HCG  10/25/2010     Negative   This test provides a presumptive diagnosis of pregnancy or non-pregnancy. A   confirmed pregnancy diagnosis should only be made by a physician after all   clinical and laboratory findings have been evaluated.    HCGS Negative 04/07/2014     HEPATIC:   Lab Results   Component Value Date    ALBUMIN 3.6 04/18/2022    PROTTOTAL 6.9 04/18/2022    ALT 21 04/18/2022    AST 16 04/18/2022    ALKPHOS 66 04/18/2022    BILITOTAL 0.4 04/18/2022     OTHER:   Lab Results   Component Value Date    BILLY 8.4 (L) 04/18/2022    TSH 1.52 04/18/2022    T4 1.14 11/29/2016    CRP <2.9 10/24/2017    SED 10 06/14/2021       Anesthesia Plan    ASA Status:  2   NPO Status:  NPO Appropriate    Anesthesia Type: General.     - Airway: ETT   Induction: Intravenous.   Maintenance: Balanced.        Consents    Anesthesia Plan(s) and associated risks, benefits, and realistic alternatives discussed. Questions answered and patient/representative(s) expressed understanding.    - Discussed:     - Discussed with:  Patient         Postoperative Care    Pain management: Multi-modal analgesia.   PONV prophylaxis: Ondansetron (or other 5HT-3), Dexamethasone or Solumedrol, Background Propofol Infusion     Comments:                Kaveh Perea MD   no

## 2024-11-26 ENCOUNTER — THERAPY VISIT (OUTPATIENT)
Dept: PHYSICAL THERAPY | Facility: CLINIC | Age: 49
End: 2024-11-26
Payer: COMMERCIAL

## 2024-11-26 DIAGNOSIS — M79.662 PAIN OF LEFT LOWER LEG: Primary | ICD-10-CM

## 2024-11-26 PROCEDURE — 97110 THERAPEUTIC EXERCISES: CPT | Mod: GP | Performed by: PHYSICAL MEDICINE & REHABILITATION

## 2024-12-02 ENCOUNTER — THERAPY VISIT (OUTPATIENT)
Dept: PHYSICAL THERAPY | Facility: CLINIC | Age: 49
End: 2024-12-02
Payer: COMMERCIAL

## 2024-12-02 DIAGNOSIS — M79.662 PAIN OF LEFT LOWER LEG: Primary | ICD-10-CM

## 2024-12-02 PROCEDURE — 97140 MANUAL THERAPY 1/> REGIONS: CPT | Mod: GP | Performed by: PHYSICAL MEDICINE & REHABILITATION

## 2024-12-02 PROCEDURE — 97110 THERAPEUTIC EXERCISES: CPT | Mod: GP | Performed by: PHYSICAL MEDICINE & REHABILITATION

## 2024-12-09 ENCOUNTER — THERAPY VISIT (OUTPATIENT)
Dept: PHYSICAL THERAPY | Facility: CLINIC | Age: 49
End: 2024-12-09
Payer: COMMERCIAL

## 2024-12-09 DIAGNOSIS — M79.662 PAIN OF LEFT LOWER LEG: Primary | ICD-10-CM

## 2024-12-09 PROCEDURE — 97140 MANUAL THERAPY 1/> REGIONS: CPT | Mod: GP | Performed by: PHYSICAL MEDICINE & REHABILITATION

## 2024-12-09 PROCEDURE — 97110 THERAPEUTIC EXERCISES: CPT | Mod: GP | Performed by: PHYSICAL MEDICINE & REHABILITATION

## 2025-03-11 ENCOUNTER — TELEPHONE (OUTPATIENT)
Dept: GASTROENTEROLOGY | Facility: CLINIC | Age: 50
End: 2025-03-11

## 2025-03-11 ENCOUNTER — OFFICE VISIT (OUTPATIENT)
Dept: FAMILY MEDICINE | Facility: CLINIC | Age: 50
End: 2025-03-11
Payer: COMMERCIAL

## 2025-03-11 VITALS
RESPIRATION RATE: 22 BRPM | HEIGHT: 65 IN | OXYGEN SATURATION: 98 % | DIASTOLIC BLOOD PRESSURE: 82 MMHG | SYSTOLIC BLOOD PRESSURE: 124 MMHG | WEIGHT: 234 LBS | TEMPERATURE: 97.1 F | BODY MASS INDEX: 38.99 KG/M2 | HEART RATE: 81 BPM

## 2025-03-11 DIAGNOSIS — R09.A2 GLOBUS SENSATION: ICD-10-CM

## 2025-03-11 DIAGNOSIS — K21.00 GASTROESOPHAGEAL REFLUX DISEASE WITH ESOPHAGITIS, UNSPECIFIED WHETHER HEMORRHAGE: Primary | ICD-10-CM

## 2025-03-11 LAB
ALBUMIN SERPL BCG-MCNC: 4.2 G/DL (ref 3.5–5.2)
ALP SERPL-CCNC: 93 U/L (ref 40–150)
ALT SERPL W P-5'-P-CCNC: 17 U/L (ref 0–50)
ANION GAP SERPL CALCULATED.3IONS-SCNC: 7 MMOL/L (ref 7–15)
AST SERPL W P-5'-P-CCNC: 19 U/L (ref 0–45)
BASOPHILS # BLD AUTO: 0 10E3/UL (ref 0–0.2)
BASOPHILS NFR BLD AUTO: 1 %
BILIRUB SERPL-MCNC: 0.3 MG/DL
BUN SERPL-MCNC: 12.4 MG/DL (ref 6–20)
CALCIUM SERPL-MCNC: 9.5 MG/DL (ref 8.8–10.4)
CHLORIDE SERPL-SCNC: 103 MMOL/L (ref 98–107)
CREAT SERPL-MCNC: 0.81 MG/DL (ref 0.51–0.95)
EGFRCR SERPLBLD CKD-EPI 2021: 88 ML/MIN/1.73M2
EOSINOPHIL # BLD AUTO: 0.6 10E3/UL (ref 0–0.7)
EOSINOPHIL NFR BLD AUTO: 9 %
ERYTHROCYTE [DISTWIDTH] IN BLOOD BY AUTOMATED COUNT: 12.2 % (ref 10–15)
GLUCOSE SERPL-MCNC: 91 MG/DL (ref 70–99)
HCO3 SERPL-SCNC: 27 MMOL/L (ref 22–29)
HCT VFR BLD AUTO: 40.1 % (ref 35–47)
HGB BLD-MCNC: 13.4 G/DL (ref 11.7–15.7)
IMM GRANULOCYTES # BLD: 0 10E3/UL
IMM GRANULOCYTES NFR BLD: 0 %
LIPASE SERPL-CCNC: 31 U/L (ref 13–60)
LYMPHOCYTES # BLD AUTO: 1.8 10E3/UL (ref 0.8–5.3)
LYMPHOCYTES NFR BLD AUTO: 29 %
MCH RBC QN AUTO: 29.8 PG (ref 26.5–33)
MCHC RBC AUTO-ENTMCNC: 33.4 G/DL (ref 31.5–36.5)
MCV RBC AUTO: 89 FL (ref 78–100)
MONOCYTES # BLD AUTO: 0.4 10E3/UL (ref 0–1.3)
MONOCYTES NFR BLD AUTO: 6 %
NEUTROPHILS # BLD AUTO: 3.4 10E3/UL (ref 1.6–8.3)
NEUTROPHILS NFR BLD AUTO: 56 %
PLATELET # BLD AUTO: 194 10E3/UL (ref 150–450)
POTASSIUM SERPL-SCNC: 4.3 MMOL/L (ref 3.4–5.3)
PROT SERPL-MCNC: 6.8 G/DL (ref 6.4–8.3)
RBC # BLD AUTO: 4.49 10E6/UL (ref 3.8–5.2)
SODIUM SERPL-SCNC: 137 MMOL/L (ref 135–145)
WBC # BLD AUTO: 6.2 10E3/UL (ref 4–11)

## 2025-03-11 PROCEDURE — 85025 COMPLETE CBC W/AUTO DIFF WBC: CPT

## 2025-03-11 PROCEDURE — 99213 OFFICE O/P EST LOW 20 MIN: CPT

## 2025-03-11 PROCEDURE — 36415 COLL VENOUS BLD VENIPUNCTURE: CPT

## 2025-03-11 PROCEDURE — 1126F AMNT PAIN NOTED NONE PRSNT: CPT

## 2025-03-11 PROCEDURE — 3074F SYST BP LT 130 MM HG: CPT

## 2025-03-11 PROCEDURE — 80053 COMPREHEN METABOLIC PANEL: CPT

## 2025-03-11 PROCEDURE — 3079F DIAST BP 80-89 MM HG: CPT

## 2025-03-11 PROCEDURE — 83690 ASSAY OF LIPASE: CPT

## 2025-03-11 RX ORDER — OMEPRAZOLE 40 MG/1
40 CAPSULE, DELAYED RELEASE ORAL DAILY
Qty: 30 CAPSULE | Refills: 1 | Status: SHIPPED | OUTPATIENT
Start: 2025-03-11

## 2025-03-11 ASSESSMENT — PAIN SCALES - GENERAL: PAINLEVEL_OUTOF10: NO PAIN (0)

## 2025-03-11 NOTE — TELEPHONE ENCOUNTER
Endoscopy Scheduling Screen      What insurance is in the chart?  Other:  [facility]    Ordering/Referring Provider: Marla Mckee   (If ordering provider performs procedure, schedule with ordering provider unless otherwise instructed. )    BMI: There is no height or weight on file to calculate BMI.  38.94    Sedation Ordered  general anesthesia.   BMI<= 45 45 < BMI <= 48 48 < BMI < = 50  BMI > 50   No Restrictions No MG ASC  No [facility]  [facility] with exceptions Hospital Only OR Only       Do you have a history of malignant hyperthermia?  NO/Hysterectomy 2 yrs ago got sick and has everytime with anesthesia vomitted    (Females) Are you currently pregnant?   NO     Are you currently on dialysis?   NO    Do you need assistance transferring?   NO    BMI: There is no height or weight on file to calculate BMI.     Is patients BMI > 50?  NO    BMI > 40?  NO    Do you have a diagnosis of diabetes?  NO    Do you take an Oral or Injectable medication for weight loss or diabetes (excluding insulin)?  NO    Do you take the medication Naltrexone?  NO    Do you take blood thinners?  NO    Prep   Are you currently have chronic kidney disease?  NO    Do you have a diagnosis of cystic fibrosis (CF)?  NO    On a regular basis do you go 3 -5 days between bowel movements?  NO    Preferred Pharmacy:  No Pharmacies Listed    Final Scheduling Details     Procedure scheduled  Upper endoscopy (EGD)    Surgeon:  Mohsen     Date of procedure:  3-19-25     Location  [place] - Patient preference.    What is your communication preference for Instructions and/or Bowel Prep?   [employer]    Patient Reminders:    You will receive a call from a Nurse to review instructions and health history.  This assessment must be completed prior to your procedure.  Failure to complete the Nurse assessment may result in the procedure being cancelled.       On the day of your procedure, please designate an adult(s) who can drive you home stay with you  for the next 24 hours. The medicines used in the exam will make you sleepy. You will not be able to drive.       You cannot take public transportation, ride share services, or non-medical taxi service without a responsible caregiver.  Medical transport services are allowed with the requirement that a responsible caregiver will receive you at your destination.  We require that drivers and caregivers are confirmed prior to your procedure.

## 2025-03-11 NOTE — PROGRESS NOTES
Assessment & Plan     Gastroesophageal reflux disease with esophagitis, unspecified whether hemorrhage  No current abdominal pain or tenderness. No red flag signs on physical examination. Will increase Omeprazole to 40 mg daily and reinforced importance of taking on empty stomach in the morning. Avoid foods/drinks that are gastric irritants such as caffeine. Smaller more frequent meals, avoid food and drinks close to bedtime. See AVS.   -EGD - Adult GI  Referral - Procedure Only  - Comprehensive metabolic panel (BMP + Alb, Alk Phos, ALT, AST, Total. Bili, TP)  - CBC with platelets and differential  - Lipase  - omeprazole (PRILOSEC) 40 MG DR capsule  Dispense: 30 capsule; Refill: 1    Globus sensation  Feeling sensation of eating foods and getting stuck, has led to gagging and vomiting. Denies any difficulty swallowing.   Mother also had similar symptoms and needed esophageal dilation.   Will start with EGD for assessment and increased PPI. Follow up if no improvement.   Interventions -chew food thoroughly and have liquids with meals , sit upright during and after meals.       Follow up in 1 month or sooner if symptoms worsen or do not improve.     Patient verbalizes understanding and is in agreement with the plan of care. All questions and concerns addressed.       Buffy Varela is a 49 year old, presenting for the following health issues:  Abdominal Pain (Heart burn and vomiting)        3/11/2025     8:10 AM   Additional Questions   Roomed by Elham RAMIREZ CMA     History of Present Illness       Reason for visit:  Heartburn  Symptom onset:  More than a month  Symptoms include:  Terrible heartburn, throwing up acid  Symptom intensity:  Severe  Symptom progression:  Staying the same  Had these symptoms before:  Yes  Has tried/received treatment for these symptoms:  No  What makes it worse:  Eating, not eating  What makes it better:  Tried medicines(Omeprazole, Famotidine, Tums, and Pepto) those used to  "work but dont anymore   She is taking medications regularly.      Has been using antacids for years and it used to work   Stopped working for her approx 3 months ago  She will burp and throw up acid  Lay down at night and feels the acid sitting in back of her throat   Eat food and feels getting stuck, has thrown it up .   Denies any chest pain, coughing, shortness of breath, abdominal pain, no changes in appetite, denies any bowel changes, no dark tarry or blood in stool.   Denies hematemesis or coffee ground appearance     She is aware of diet triggers for GERD, avoiding triggers  Drinking 1 cup coffee in the morning   Sleeping with HOB elevated     Family history of GERD and her mother also needed esophageal dilation.       Review of Systems  Constitutional, HEENT, cardiovascular, pulmonary, gi and gu systems are negative, except as otherwise noted.      Objective    /82 (BP Location: Right arm, Patient Position: Sitting, Cuff Size: Adult Large)   Pulse 81   Temp 97.1  F (36.2  C) (Tympanic)   Resp 22   Ht 1.651 m (5' 5\")   Wt 106.1 kg (234 lb)   LMP 08/15/2022 (Approximate)   SpO2 98%   BMI 38.94 kg/m    Body mass index is 38.94 kg/m .  Physical Exam   GENERAL: alert and no distress  EYES: Eyes grossly normal to inspection,  conjunctivae and sclerae normal  HENT: Nose and mouth without ulcers or lesions, mucous membranes moist. No oropharynx erythema.   NECK: no adenopathy, no asymmetry, masses, or scars  RESP: lungs clear to auscultation - no rales, rhonchi or wheezes  CV: regular rate and rhythm, normal S1 S2, no S3 or S4, no murmur, click or rub, no peripheral edema  ABDOMEN: soft, nontender, no hepatosplenomegaly, no masses and bowel sounds normal  MS: no gross musculoskeletal defects noted, no edema  SKIN: no suspicious lesions or rashes  NEURO: Normal strength and tone, mentation intact and speech normal  PSYCH: mentation appears normal, affect normal/bright            Signed Electronically " by: Marla Mckee, DNP

## 2025-03-18 ENCOUNTER — ANESTHESIA EVENT (OUTPATIENT)
Dept: GASTROENTEROLOGY | Facility: CLINIC | Age: 50
End: 2025-03-18
Payer: COMMERCIAL

## 2025-03-18 NOTE — ANESTHESIA PREPROCEDURE EVALUATION
Anesthesia Pre-Procedure Evaluation    Patient: Teresa Brian   MRN: [id number] : 1975        Procedure : Procedure(s):  Esophagoscopy, gastroscopy, duodenoscopy (EGD), combined          Past Medical History:   Diagnosis Date     Asthma      BMI 34.0-34.9,adult 10/06/2015     CARDIOVASCULAR SCREENING; LDL GOAL LESS THAN 160      Cervical dysplasia 2010-colpo @ [facility]-->JOANNA 1; persistent LGSIL/ASC-US paps; colposcopy 2010 with JOANNA 1, focal JOANNA 2 and benign ECC     Condyloma acuminatum 2009    perirecal BX      Herpes simplex virus infection     HSV-1     Menarche age 15    cycles q 3-4 x 3-4 d w cramps     Menorrhagia      Migraine      Personal history of cervical dysplasia     () JOANNA I; () JOANNA I and focal JOANNA II     PONV (postoperative nausea and vomiting)      Right shoulder pain 2019      Past Surgical History:   Procedure Laterality Date     ARTHROSCOPIC RECONSTRUCTION ANTERIOR CRUCIATE LIGAMENT Right 10/2010    Reconstruction (ACL, MCL) and fibular fx     CATARACT IOL, RT/LT      2017, 2017     COLONOSCOPY       COLONOSCOPY N/A 2017    Procedure: COLONOSCOPY;;  Surgeon: Allan Olivier MD;  Location:  GI     COLPOSCOPY CERVIX, LOOP ELECTRODE BIOPSY, COMBINED  2011    squamous metaplasia     COMBINED DILATION AND CURETTAGE, ABLATE ENDOMETRIUM FELICIA N/A 2019    Procedure: FELICIA ABLATION;  Surgeon: Daniella Duncan MD;  Location:  OR     CYSTOSCOPY N/A 2022    Procedure: Diagnostic cystoscopy;  Surgeon: Daniella Duncan MD;  Location:  OR     ESOPHAGOSCOPY, GASTROSCOPY, DUODENOSCOPY (EGD), COMBINED       ESOPHAGOSCOPY, GASTROSCOPY, DUODENOSCOPY (EGD), COMBINED N/A 2017    Procedure: COMBINED ESOPHAGOSCOPY, GASTROSCOPY, DUODENOSCOPY (EGD), BIOPSY SINGLE OR MULTIPLE;  ESOPHAGOSCOPY, GASTROSCOPY, DUODENOSCOPY (EGD with biopsies by cold forcep, Colonoscopy with biopsies by cold forcep.;  Surgeon: Allan Olivier MD;   Location: [facility]     HC TOOTH EXTRACTION W/FORCEP  2000    wisdom teeth extraction     LAPAROSCOPIC ASSISTED HYSTERECTOMY VAGINAL, BILATERAL SALPINGO-OOPHORECTOMY, COMBINED Bilateral 8/31/2022    Procedure: Laparoscopic assisted vaginal hysterectomy with right salpingectomy, left salpingo-oophorectomy;  Surgeon: Daniella Duncan MD;  Location:  OR     LAPAROSCOPY DIAGNOSTIC (GYN)  1992    negative (Aliabadi)     OPERATIVE HYSTEROSCOPY N/A 05/02/2019    Procedure: HYSTEROSCOPY;  Surgeon: Daniella Duncan MD;  Location:  OR     SHOULDER SURGERY Right 12/30/2019    right shoulder      Allergies   Allergen Reactions     Vicodin [Hydrocodone-Acetaminophen] Nausea and Vomiting      Social History     Tobacco Use     Smoking status: Never     Smokeless tobacco: Never   Substance Use Topics     Alcohol use: Yes     Alcohol/week: 2.0 standard drinks of alcohol     Comment: 2 drinks per week avg      Wt Readings from Last 1 Encounters:   03/11/25 106.1 kg (234 lb)        Anesthesia Evaluation   Pt has had prior anesthetic. Type: General, MAC and Regional.    History of anesthetic complications  - PONV.      ROS/MED HX  ENT/Pulmonary:     (+)                     Intermittent, asthma                  Neurologic:     (+)      migraines,                          Cardiovascular:  - neg cardiovascular ROS     METS/Exercise Tolerance:     Hematologic:  - neg hematologic  ROS     Musculoskeletal:  - neg musculoskeletal ROS     GI/Hepatic:     (+) GERD, Symptomatic,                  Renal/Genitourinary:  - neg Renal ROS     Endo: Comment: Morbid obesity    (+)               Obesity,       Psychiatric/Substance Use:  - neg psychiatric ROS     Infectious Disease:  - neg infectious disease ROS     Malignancy:  - neg malignancy ROS     Other:  - neg other ROS          Physical Exam    Airway  airway exam normal           Respiratory Devices and Support         Dental       (+) Minor Abnormalities - some fillings, tiny  "chips      Cardiovascular   cardiovascular exam normal          Pulmonary   pulmonary exam normal            OUTSIDE LABS:  CBC:   Lab Results   Component Value Date    WBC 6.2 03/11/2025    WBC 14.3 (H) 09/01/2022    HGB 13.4 03/11/2025    HGB 12.5 09/01/2022    HCT 40.1 03/11/2025    HCT 37.7 09/01/2022     03/11/2025     09/01/2022     BMP:   Lab Results   Component Value Date     03/11/2025     04/18/2022    POTASSIUM 4.3 03/11/2025    POTASSIUM 3.9 04/18/2022    CHLORIDE 103 03/11/2025    CHLORIDE 107 04/18/2022    CO2 27 03/11/2025    CO2 27 04/18/2022    BUN 12.4 03/11/2025    BUN 10 04/18/2022    CR 0.81 03/11/2025    CR 0.68 04/18/2022    GLC 91 03/11/2025    GLC 94 09/01/2022     COAGS: No results found for: \"PTT\", \"INR\", \"FIBR\"  POC:   Lab Results   Component Value Date    HCG  10/25/2010     Negative   This test provides a presumptive diagnosis of pregnancy or non-pregnancy. A   confirmed pregnancy diagnosis should only be made by a physician after all   clinical and laboratory findings have been evaluated.    HCGS Negative 04/07/2014     HEPATIC:   Lab Results   Component Value Date    ALBUMIN 4.2 03/11/2025    PROTTOTAL 6.8 03/11/2025    ALT 17 03/11/2025    AST 19 03/11/2025    ALKPHOS 93 03/11/2025    BILITOTAL 0.3 03/11/2025     OTHER:   Lab Results   Component Value Date    BILLY 9.5 03/11/2025    LIPASE 31 03/11/2025    TSH 1.52 04/18/2022    T4 1.14 11/29/2016    CRP <2.9 10/24/2017    SED 10 06/14/2021       Anesthesia Plan    ASA Status:  3    NPO Status:  NPO Appropriate    Anesthesia Type: General.     - Airway: Native airway      Maintenance: Balanced.        Consents    Anesthesia Plan(s) and associated risks, benefits, and realistic alternatives discussed. Questions answered and patient/representative(s) expressed understanding.     - Discussed: Risks, Benefits and Alternatives for BOTH SEDATION and the PROCEDURE were discussed     - Discussed with:  Patient        " "    Postoperative Care            Comments:             JEAN-PAUL Jaime CRNA    I have reviewed the pertinent notes and labs in the chart from the past 30 days and (re)examined the patient.  Any updates or changes from those notes are reflected in this note.    Clinically Significant Risk Factors Present on Admission                             # Obesity: Estimated body mass index is 38.94 kg/m  as calculated from the following:    Height as of 3/11/25: 1.651 m (5' 5\").    Weight as of 3/11/25: 106.1 kg (234 lb).                "

## 2025-03-19 ENCOUNTER — HOSPITAL ENCOUNTER (OUTPATIENT)
Facility: CLINIC | Age: 50
Discharge: HOME | End: 2025-03-19
Attending: SURGERY | Admitting: SURGERY
Payer: COMMERCIAL

## 2025-03-19 ENCOUNTER — ANESTHESIA (OUTPATIENT)
Dept: GASTROENTEROLOGY | Facility: CLINIC | Age: 50
End: 2025-03-19
Payer: COMMERCIAL

## 2025-03-19 VITALS
DIASTOLIC BLOOD PRESSURE: 80 MMHG | OXYGEN SATURATION: 95 % | TEMPERATURE: 98 F | SYSTOLIC BLOOD PRESSURE: 129 MMHG | RESPIRATION RATE: 16 BRPM | HEART RATE: 82 BPM | BODY MASS INDEX: 38.99 KG/M2 | WEIGHT: 234 LBS | HEIGHT: 65 IN

## 2025-03-19 LAB — UPPER GI ENDOSCOPY: NORMAL

## 2025-03-19 PROCEDURE — 250N000011 HC RX IP 250 OP 636: Performed by: NURSE ANESTHETIST, CERTIFIED REGISTERED

## 2025-03-19 PROCEDURE — 370N000017 HC ANESTHESIA TECHNICAL FEE, PER MIN: Performed by: SURGERY

## 2025-03-19 PROCEDURE — 88305 TISSUE EXAM BY PATHOLOGIST: CPT | Mod: TC | Performed by: SURGERY

## 2025-03-19 PROCEDURE — 250N000009 HC RX 250: Performed by: NURSE ANESTHETIST, CERTIFIED REGISTERED

## 2025-03-19 PROCEDURE — 258N000003 HC RX IP 258 OP 636: Performed by: NURSE ANESTHETIST, CERTIFIED REGISTERED

## 2025-03-19 PROCEDURE — 43239 EGD BIOPSY SINGLE/MULTIPLE: CPT | Performed by: SURGERY

## 2025-03-19 RX ORDER — SODIUM CHLORIDE, SODIUM LACTATE, POTASSIUM CHLORIDE, CALCIUM CHLORIDE 600; 310; 30; 20 MG/100ML; MG/100ML; MG/100ML; MG/100ML
INJECTION, SOLUTION INTRAVENOUS CONTINUOUS
Status: DISCONTINUED | OUTPATIENT
Start: 2025-03-19 | End: 2025-03-19 | Stop reason: HOSPADM

## 2025-03-19 RX ORDER — ONDANSETRON 2 MG/ML
4 INJECTION INTRAMUSCULAR; INTRAVENOUS
Status: DISCONTINUED | OUTPATIENT
Start: 2025-03-19 | End: 2025-03-19 | Stop reason: HOSPADM

## 2025-03-19 RX ORDER — LIDOCAINE 40 MG/G
CREAM TOPICAL
Status: DISCONTINUED | OUTPATIENT
Start: 2025-03-19 | End: 2025-03-19 | Stop reason: HOSPADM

## 2025-03-19 RX ORDER — ONDANSETRON 4 MG/1
4 TABLET, ORALLY DISINTEGRATING ORAL EVERY 30 MIN PRN
Status: DISCONTINUED | OUTPATIENT
Start: 2025-03-19 | End: 2025-03-19 | Stop reason: HOSPADM

## 2025-03-19 RX ORDER — SODIUM CHLORIDE, SODIUM LACTATE, POTASSIUM CHLORIDE, CALCIUM CHLORIDE 600; 310; 30; 20 MG/100ML; MG/100ML; MG/100ML; MG/100ML
INJECTION, SOLUTION INTRAVENOUS CONTINUOUS PRN
Status: DISCONTINUED | OUTPATIENT
Start: 2025-03-19 | End: 2025-03-19

## 2025-03-19 RX ORDER — ONDANSETRON 2 MG/ML
4 INJECTION INTRAMUSCULAR; INTRAVENOUS EVERY 30 MIN PRN
Status: DISCONTINUED | OUTPATIENT
Start: 2025-03-19 | End: 2025-03-19 | Stop reason: HOSPADM

## 2025-03-19 RX ORDER — LIDOCAINE HYDROCHLORIDE 20 MG/ML
INJECTION, SOLUTION INFILTRATION; PERINEURAL PRN
Status: DISCONTINUED | OUTPATIENT
Start: 2025-03-19 | End: 2025-03-19

## 2025-03-19 RX ORDER — PROPOFOL 10 MG/ML
INJECTION, EMULSION INTRAVENOUS CONTINUOUS PRN
Status: DISCONTINUED | OUTPATIENT
Start: 2025-03-19 | End: 2025-03-19

## 2025-03-19 RX ADMIN — PROPOFOL 200 MCG/KG/MIN: 10 INJECTION, EMULSION INTRAVENOUS at 10:35

## 2025-03-19 RX ADMIN — TOPICAL ANESTHETIC 1 SPRAY: 200 SPRAY DENTAL; PERIODONTAL at 10:35

## 2025-03-19 RX ADMIN — LIDOCAINE HYDROCHLORIDE 5 ML: 20 INJECTION, SOLUTION INFILTRATION; PERINEURAL at 10:36

## 2025-03-19 RX ADMIN — SODIUM CHLORIDE, SODIUM LACTATE, POTASSIUM CHLORIDE, AND CALCIUM CHLORIDE: .6; .31; .03; .02 INJECTION, SOLUTION INTRAVENOUS at 10:34

## 2025-03-19 RX ADMIN — TOPICAL ANESTHETIC 1 SPRAY: 200 SPRAY DENTAL; PERIODONTAL at 10:32

## 2025-03-19 RX ADMIN — PROPOFOL 50 MG: 10 INJECTION, EMULSION INTRAVENOUS at 10:50

## 2025-03-19 RX ADMIN — PROPOFOL 50 MG: 10 INJECTION, EMULSION INTRAVENOUS at 10:37

## 2025-03-19 ASSESSMENT — ACTIVITIES OF DAILY LIVING (ADL)
ADLS_ACUITY_SCORE: 48

## 2025-03-19 NOTE — ANESTHESIA CARE TRANSFER NOTE
Patient: Teresa Brian    Procedure: Procedure(s):  ESOPHAGOGASTRODUODENOSCOPY, WITH BIOPSY       Diagnosis: Gastroesophageal reflux disease with esophagitis, unspecified whether hemorrhage [K21.00]  Diagnosis Additional Information: No value filed.    Anesthesia Type:   General     Note:    Oropharynx: oropharynx clear of all foreign objects and spontaneously breathing  Level of Consciousness: awake  Oxygen Supplementation: room air    Independent Airway: airway patency satisfactory and stable  Dentition: dentition unchanged  Vital Signs Stable: post-procedure vital signs reviewed and stable  Report to RN Given: handoff report given  Patient transferred to: Phase II    Handoff Report: Identifed the Patient, Identified the Reponsible Provider, Reviewed the pertinent medical history, Discussed the surgical course, Reviewed Intra-OP anesthesia mangement and issues during anesthesia, Set expectations for post-procedure period and Allowed opportunity for questions and acknowledgement of understanding  Vitals:  Vitals Value Taken Time   BP     Temp     Pulse     Resp     SpO2         Electronically Signed By: JEAN-PAUL Kennedy CRNA  March 19, 2025  11:02 AM

## 2025-03-19 NOTE — ANESTHESIA POSTPROCEDURE EVALUATION
Patient: Teresa Brian    Procedure: Procedure(s):  ESOPHAGOGASTRODUODENOSCOPY, WITH BIOPSY       Anesthesia Type:  General    Note:  Disposition: Outpatient   Postop Pain Control: Uneventful            Sign Out: Well controlled pain   PONV: No   Neuro/Psych: Uneventful            Sign Out: Acceptable/Baseline neuro status   Airway/Respiratory: Uneventful            Sign Out: Acceptable/Baseline resp. status   CV/Hemodynamics: Uneventful            Sign Out: Acceptable CV status; No obvious hypovolemia; No obvious fluid overload   Other NRE: NONE   DID A NON-ROUTINE EVENT OCCUR? No       Last vitals:  Vitals:    03/19/25 0859   BP: (!) 142/72   Pulse: 87   Resp: 16   Temp: 36.8  C (98.2  F)       Electronically Signed By: JEAN-PAUL Kennedy CRNA  March 19, 2025  11:07 AM

## 2025-03-19 NOTE — H&P
Former[facility]    Pre-Endoscopy History and Physical     Teresa Brian MRN# [id number]   YOB: 1975 Age: 49 year old     Date of Procedure: 3/19/2025  Primary care provider: Marla Mckee  Type of Endoscopy: Esophagogastroduodenoscopy with possible biopsy, possible dilation, possible foreign body removal  Reason for Procedure: GERD  Type of Anesthesia Anticipated: Conscious Sedation    HPI:    Teresa is a 49 year old female who will be undergoing the above procedure.      A history and physical has been performed. The patient's medications and allergies have been reviewed. The risks and benefits of the procedure and the sedation options and risks were discussed with the patient.  All questions were answered and informed consent was obtained.      She denies a personal or family history of anesthesia complications or bleeding disorders. Denies blood thinners.    Last EGD 2017, demonstrated no abnormalities. Family history of esophageal dilatation (mother). Surgical history significant for laparoscopic hysterectomy-BSO.    Endorses reflux that is present all the time, worse when lying flat. Currently taking Prilosec 40 mg daily, previously on 20 mg daily, however this was ineffective at relief of symptoms. Otherwise denies nausea, emesis, abdominal pain, diarrhea, constipation, hematochezia, melena.    Patient Active Problem List   Diagnosis    CARDIOVASCULAR SCREENING; LDL GOAL LESS THAN 160    Menorrhagia    History of cervical dysplasia    Migraine with aura and without status migrainosus, not intractable    Family history of Parkinson disease - Father early onset around age 30-40's    S/P ACL reconstruction    Epigastric pain- chronic intermittent since 4/2017. Extensive normal work-up: hepatic/celiac panels, lipase, TSH, CBC, CRP/ESR, EGD, colonoscopy, HIDA scan, CT abdomen.Per GI may be functional origin     H/O LEEP    Menometrorrhagia    Aftercare following right shoulder  joint replacement surgery    Impingement syndrome of right shoulder    Scapular dyskinesis    Pain of left lower leg        Past Medical History:   Diagnosis Date    Asthma     BMI 34.0-34.9,adult 10/06/2015    CARDIOVASCULAR SCREENING; LDL GOAL LESS THAN 160     Cervical dysplasia 2010 2009-colpo @ [facility] [place]-->JOANNA 1; persistent LGSIL/ASC-US paps; colposcopy 12/2010 with JOANNA 1, focal JOANNA 2 and benign ECC    Condyloma acuminatum 04/2009    perirecal BX     Herpes simplex virus infection     HSV-1    Menarche age 15    cycles q 3-4 x 3-4 d w cramps    Menorrhagia     Migraine     Personal history of cervical dysplasia 2010    (2009) JOANNA I; (2010) JOANNA I and focal JOANNA II    PONV (postoperative nausea and vomiting)     Right shoulder pain 09/09/2019        Past Surgical History:   Procedure Laterality Date    ARTHROSCOPIC RECONSTRUCTION ANTERIOR CRUCIATE LIGAMENT Right 10/2010    Reconstruction (ACL, MCL) and fibular fx    CATARACT IOL, RT/LT      5/2017, 9/2017    COLONOSCOPY      COLONOSCOPY N/A 06/27/2017    Procedure: COLONOSCOPY;;  Surgeon: Allan Olivier MD;  Location:  GI    COLPOSCOPY CERVIX, LOOP ELECTRODE BIOPSY, COMBINED  08/2011    squamous metaplasia    COMBINED DILATION AND CURETTAGE, ABLATE ENDOMETRIUM FELICIA N/A 05/02/2019    Procedure: FELICIA ABLATION;  Surgeon: Daniella Duncan MD;  Location:  OR    CYSTOSCOPY N/A 8/31/2022    Procedure: Diagnostic cystoscopy;  Surgeon: Daniella Duncan MD;  Location:  OR    ESOPHAGOSCOPY, GASTROSCOPY, DUODENOSCOPY (EGD), COMBINED      ESOPHAGOSCOPY, GASTROSCOPY, DUODENOSCOPY (EGD), COMBINED N/A 06/27/2017    Procedure: COMBINED ESOPHAGOSCOPY, GASTROSCOPY, DUODENOSCOPY (EGD), BIOPSY SINGLE OR MULTIPLE;  ESOPHAGOSCOPY, GASTROSCOPY, DUODENOSCOPY (EGD with biopsies by cold forcep, Colonoscopy with biopsies by cold forcep.;  Surgeon: Allan Olivier MD;  Location:  GI    HC TOOTH EXTRACTION W/FORCEP  2000    wisdom teeth extraction    LAPAROSCOPIC  ASSISTED HYSTERECTOMY VAGINAL, BILATERAL SALPINGO-OOPHORECTOMY, COMBINED Bilateral 8/31/2022    Procedure: Laparoscopic assisted vaginal hysterectomy with right salpingectomy, left salpingo-oophorectomy;  Surgeon: Daniella Duncan MD;  Location: [facility] OR    LAPAROSCOPY DIAGNOSTIC (GYN)  1992    negative (Aliabadi)    OPERATIVE HYSTEROSCOPY N/A 05/02/2019    Procedure: HYSTEROSCOPY;  Surgeon: Daniella Duncan MD;  Location: [facility] OR    SHOULDER SURGERY Right 12/30/2019    right shoulder       Social History     Tobacco Use    Smoking status: Never    Smokeless tobacco: Never   Substance Use Topics    Alcohol use: Yes     Alcohol/week: 2.0 standard drinks of alcohol     Comment: 2 drinks per week avg       Family History   Problem Relation Age of Onset    Neurologic Disorder Mother         Fibromyalgia. migraines    Genetic Disorder Mother         Fibromyalgia    Other Problems  (esophageal dilation) Mother     Neurologic Disorder Father         parkinson with early dementia    Genetic Disorder Father         Parkinsons, Brain Anuerism    Other Cancer Sister         Skin    GERD Sister     No Known Problems Brother     No Known Problems Maternal Grandmother     No Known Problems Maternal Grandfather     No Known Problems Paternal Grandmother     No Known Problems Paternal Grandfather     No Known Problems Other     Colon Cancer No family hx of     Thyroid Disease No family hx of     Diabetes No family hx of     Hypertension No family hx of     Hyperlipidemia No family hx of     Breast Cancer No family hx of     Skin Cancer No family hx of        Prior to Admission medications    Medication Sig Start Date End Date Taking? Authorizing Provider   acetaminophen (TYLENOL) 325 MG tablet Take 2 tablets (650 mg) by mouth every 4 hours as needed for other (mild pain) 8/31/22  Yes Daniella Duncan MD   acetaminophen (TYLENOL) 500 MG tablet Take 500-1,000 mg by mouth every 6 hours as needed for pain   Yes Reported, Patient  "  Bismuth Subsalicylate (PEPTO BISMOL PO) Take by mouth.   Yes Reported, Patient   calcium carbonate (TUMS) 500 MG chewable tablet Take 12 chew tab by mouth as needed for heartburn   Yes Reported, Patient   cyclobenzaprine (FLEXERIL) 5 MG tablet Take 1 tablet (5 mg) by mouth 3 times daily as needed for muscle spasms. 11/5/24  Yes Marla Mckee DNP   ibuprofen (ADVIL/MOTRIN) 200 MG tablet Take 200-400 mg by mouth every 6 hours as needed for mild pain   Yes Reported, Patient   ibuprofen (ADVIL/MOTRIN) 600 MG tablet Take 1 tablet (600 mg) by mouth every 6 hours as needed (mild) 8/31/22  Yes Daniella Duncan MD   meloxicam (MOBIC) 15 MG tablet Take 1 tablet (15 mg) by mouth daily. 11/5/24  Yes Marla Mckee DNP   omeprazole (PRILOSEC) 40 MG DR capsule Take 1 capsule (40 mg) by mouth daily. 3/11/25  Yes Marla Mckee DNP       Allergies   Allergen Reactions    Vicodin [Hydrocodone-Acetaminophen] Nausea and Vomiting        REVIEW OF SYSTEMS:   5 point ROS negative except as noted above in HPI, including Gen., Resp., CV, GI &  system review.    PHYSICAL EXAM:   BP (!) 142/72 (BP Location: Left arm)   Pulse 87   Temp 98.2  F (36.8  C) (Oral)   Resp 16   Ht 1.651 m (5' 5\")   Wt 106.1 kg (234 lb)   LMP 08/15/2022 (Approximate)   BMI 38.94 kg/m   Estimated body mass index is 38.94 kg/m  as calculated from the following:    Height as of this encounter: 1.651 m (5' 5\").    Weight as of this encounter: 106.1 kg (234 lb).   Constitutional: Awake, alert, no acute distress.  Eyes: No scleral icterus.  Conjunctiva are without injection.  ENMT: Mucous membranes moist, dentition and gums are intact.   Neck: Soft, supple, trachea midline.    Endocrine: n/a   Lymphatic: There is no cervical, submandibularadenopathy.  Respiratory: normal effortgs   Cardiovascular: S1, S2  Abdomen: Non-distended, non-tender,  No masses,  Musculoskeletal: No spinal or CVA tenderness. Full range of motion in the upper and lower " extremities.    Skin: No skin rashes or lesions to inspection.  No petechia.    Neurologic: alerted and oriented 3x  Psychiatric: The patient's affect is not blunted and mood is appropriate.  DIAGNOSTICS:    Not indicated    IMPRESSION   ASA Class 2 - Mild systemic disease    PLAN:   Plan for Esophagogastroduodenoscopy with possible biopsy, possible dilation, possible foreign body removal. We discussed the risks, benefits and alternatives and the patient wished to proceed.  Patient is cleared for the above procedure.    The above has been forwarded to the consulting provider.    The above has been discussed with attending physician. Any changes will be made in the attending attestation statement/addendum.    Fara Evans DO PGY-2  [facility] Res[facility]  [facility]

## 2025-03-20 LAB
PATH REPORT.COMMENTS IMP SPEC: NORMAL
PATH REPORT.FINAL DX SPEC: NORMAL
PATH REPORT.GROSS SPEC: NORMAL
PATH REPORT.MICROSCOPIC SPEC OTHER STN: NORMAL
PATH REPORT.RELEVANT HX SPEC: NORMAL
PHOTO IMAGE: NORMAL

## 2025-03-20 PROCEDURE — 88305 TISSUE EXAM BY PATHOLOGIST: CPT | Mod: 26 | Performed by: PATHOLOGY

## 2025-03-31 ENCOUNTER — MYC MEDICAL ADVICE (OUTPATIENT)
Dept: FAMILY MEDICINE | Facility: CLINIC | Age: 50
End: 2025-03-31
Payer: COMMERCIAL

## 2025-05-15 ENCOUNTER — OFFICE VISIT (OUTPATIENT)
Dept: FAMILY MEDICINE | Facility: CLINIC | Age: 50
End: 2025-05-15
Payer: COMMERCIAL

## 2025-05-15 VITALS
TEMPERATURE: 97.4 F | WEIGHT: 235 LBS | HEIGHT: 65 IN | BODY MASS INDEX: 39.15 KG/M2 | HEART RATE: 89 BPM | SYSTOLIC BLOOD PRESSURE: 134 MMHG | DIASTOLIC BLOOD PRESSURE: 82 MMHG | OXYGEN SATURATION: 97 % | RESPIRATION RATE: 16 BRPM

## 2025-05-15 DIAGNOSIS — K21.00 GASTROESOPHAGEAL REFLUX DISEASE WITH ESOPHAGITIS WITHOUT HEMORRHAGE: Primary | ICD-10-CM

## 2025-05-15 DIAGNOSIS — K22.710 BARRETT'S ESOPHAGUS WITH LOW GRADE DYSPLASIA: ICD-10-CM

## 2025-05-15 RX ORDER — OMEPRAZOLE 20 MG/1
20 CAPSULE, DELAYED RELEASE ORAL DAILY
Qty: 90 CAPSULE | Refills: 1 | Status: SHIPPED | OUTPATIENT
Start: 2025-05-15

## 2025-05-15 RX ORDER — OMEPRAZOLE 40 MG/1
40 CAPSULE, DELAYED RELEASE ORAL DAILY
Qty: 30 CAPSULE | Refills: 0 | Status: SHIPPED | OUTPATIENT
Start: 2025-05-15

## 2025-05-15 RX ORDER — FAMOTIDINE 20 MG/1
20 TABLET, FILM COATED ORAL DAILY
Qty: 90 TABLET | Refills: 1 | Status: SHIPPED | OUTPATIENT
Start: 2025-05-15

## 2025-05-15 ASSESSMENT — PAIN SCALES - GENERAL: PAINLEVEL_OUTOF10: NO PAIN (0)

## 2025-05-15 NOTE — PROGRESS NOTES
Assessment & Plan     Gastroesophageal reflux disease with esophagitis without hemorrhage  Plan to continue lifestyle changes to reduce gastric irritants, sleep HOB elevated, See AVS.   Will continue the omeprazole 40 mg another month then decrease to 20 mg.   Pepcid at  bedtime, may increase to twice a day if needed.   Referral to GI  Will check Vitamin b 12 and CMP in 3 months.   - omeprazole (PRILOSEC) 40 MG DR capsule  Dispense: 30 capsule; Refill: 0  - Adult GI  Referral - Consult Only  - Adult GI  Referral - Procedure Only  - famotidine (PEPCID) 20 MG tablet  Dispense: 90 tablet; Refill: 1  - omeprazole (PRILOSEC) 20 MG DR capsule  Dispense: 90 capsule; Refill: 1  - Vitamin B12  - Comprehensive metabolic panel (BMP + Alb, Alk Phos, ALT, AST, Total. Bili, TP)    Burger's esophagus with low grade dysplasia  Annual EGD . Education provided. See AVS.   - omeprazole (PRILOSEC) 40 MG DR capsule  Dispense: 30 capsule; Refill: 0  - Adult GI  Referral - Consult Only  - Adult GI  Referral - Procedure Only  - famotidine (PEPCID) 20 MG tablet  Dispense: 90 tablet; Refill: 1  - omeprazole (PRILOSEC) 20 MG DR capsule  Dispense: 90 capsule; Refill: 1  - Vitamin B12  - Comprehensive metabolic panel (BMP + Alb, Alk Phos, ALT, AST, Total. Bili, TP)    Patient verbalizes understanding and is in agreement with the plan of care. All questions and concerns addressed.                  Follow-up    Follow-up Visit   Expected date:  Aug 15, 2025 (Approximate)      Follow Up Appointment Details:     Follow-up with whom?: Me    Follow-Up for what?: Chronic Disease f/u    Chronic Disease f/u: General (Other) Comment - GERD    How?: In Person                 Subjective   Nichole is a 49 year old, presenting for the following health issues:  Results (EGD biopsy results)      5/15/2025     7:02 AM   Additional Questions   Roomed by Rachana GODOY   Accompanied by Bebe         5/15/2025     7:02 AM   Patient  "Reported Additional Medications   Patient reports taking the following new medications None     History of Present Illness       Reason for visit:  Follow up for heartburn    She eats 2-3 servings of fruits and vegetables daily.She consumes 0 sweetened beverage(s) daily.She exercises with enough effort to increase her heart rate 9 or less minutes per day.  She exercises with enough effort to increase her heart rate 3 or less days per week.   She is taking medications regularly.      Symptoms starting to improve with heartburn and vomiting.  Occasionally having reflux that leads to emesis  Denies any abdominal pain  Making changes in diet to avoid increase caffeine, spicy foods.     EGD 3/19/25  Gastric polyps  Hital hernia  Pathology with chronic inflammation and barrets esophagus   Taking omeprazole 40 mg daily in the morning        Review of Systems  Constitutional, HEENT, cardiovascular, pulmonary, gi and gu systems are negative, except as otherwise noted.      Objective    /82   Pulse 89   Temp 97.4  F (36.3  C) (Tympanic)   Resp 16   Ht 1.651 m (5' 5\")   Wt 106.6 kg (235 lb)   LMP 08/15/2022 (Approximate)   SpO2 97%   BMI 39.11 kg/m    Body mass index is 39.11 kg/m .  Physical Exam   GENERAL: alert and no distress  HENT: Nose and mouth without ulcers or lesions  RESP: lungs clear to auscultation - no rales, rhonchi or wheezes  CV: regular rate and rhythm, normal S1 S2, no S3 or S4, no murmur, click or rub, no peripheral edema  ABDOMEN: soft, nontender, no hepatosplenomegaly, no masses and bowel sounds normal  SKIN: no suspicious lesions or rashes  NEURO: Normal strength and tone, mentation intact and speech normal  PSYCH: mentation appears normal, affect normal/bright    Admission on 03/19/2025, Discharged on 03/19/2025   Component Date Value Ref Range Status    Case Report 03/19/2025    Final                    Value:Surgical Pathology Report                         Case: [id number]               "                    Authorizing Provider:  Padilla Mcneill,   Collected:           03/19/2025 10:42 AM                                 DO                                                                           Ordering Location:     [facility]   Received:            03/19/2025 11:02 AM                                 [place]                                                                      Pathologist:           Melvin Alston MD                                                            Specimens:   A) - Small Intestine, Duodenum, Duodenal Biopsy                                                     B) - Stomach, Antrum, Stomach Antrum                                                                C) - Stomach, Body, Stomach Body                                                                    D) - Stomach, Cardia, Stomach Cardia                                                                                          E) - Gastric Esophageal Junction, GE Junction                                                       F) - Esophagus, Mid, Mid esophagus                                                         Final Diagnosis 03/19/2025    Final                    Value:A. Duodenum, biopsies:  -- Peptic type duodenitis.    B. Stomach, antrum biopsies:  -- Gastric mucosa with mild chronic inactive gastritis.  -- Negative for Helicobacter pylori organisms or dysplasia.      C.  Stomach, body, biopsies:  -- Small fragments of oxyntic mucosa without diagnostic alteration.    D.  Stomach, cardia, biopsies:  -- Small fragments of oxyntic mucosa with mild chronic inflammation.    E. Gastroesophageal junction, biopsies:  -- Small fragments of squamocolumnar mucosa with acute and chronic inflammation, reflux changes, and focal goblet cell metaplasia/Burger's type mucosa.  -- Negative for dysplasia.  -- See comment.    F.  Esophagus, mid, biopsies:  -- Increased eosinophils, reaching up to 25/hpf, see  comment.            Comment 03/19/2025    Final                    Value:A very small focus of metaplastic intestinalized columnar epithelium is present. This histologic finding only in the appropriate clinical context (at least 1 cm of characteristic endoscopic appearance involving the tubular esophagus) is consistent with Burger's esophagus. Clinical correlation is required.     H&E-stained sections demonstrate squamous mucosa with basal cell hyperplasia, spongiosis, and increased intraepithelial lymphocytes and eosinophils, which in areas reach up to 25/hpf.  Dysplasia is not identified.  There is considerable histologic overlap between reflux esophagitis and eosinophilic esophagitis.  Clinical correlation is essential for subclassification.          Clinical Information 03/19/2025    Final                    Value:Procedure:  ESOPHAGOGASTRODUODENOSCOPY, WITH BIOPSY  Pre-op Diagnosis: Gastroesophageal reflux disease with esophagitis, unspecified whether hemorrhage [K21.00]  Post-op Diagnosis: K21.00 - Gastroesophageal reflux disease with esophagitis, unspecified whether hemorrhage [ICD-10-CM]      Gross Description 03/19/2025    Final                    Value:A(1). Small Intestine, Duodenum, Duodenal Biopsy:  The specimen is received in formalin, labeled with the patient's name, medical record number and other identifying information and designated  duodenum biopsy . It consists of 2 tan soft tissue fragments ranging from 0.2-0.4 cm. Entirely submitted in one cassette.    B(2). Stomach, Antrum, Stomach Antrum:  The specimen is received in formalin, labeled with the patient's name, medical record number and other identifying information and designated  stomach antrum . It consists of 2 tan soft tissue fragments ranging from 0.3-0.6 cm. Entirely submitted in one cassette.    C(3). Stomach, Body, Stomach Body:  The specimen is received in formalin, labeled with the patient's name, medical record number and other  identifying information and designated  stomach body . It consists of 2 tan soft tissue fragments ranging from 0.2-0.3 cm. Entirely submitted in one cassette.    D(4). Stomach, Cardia, Stomach Cardia:  The specimen is received in                           formalin, labeled with the patient's name, medical record number and other identifying information and designated  stomach cardia . It consists of 3 tan soft tissue fragments ranging from 0.1-and 0.3 cm. Entirely submitted in one cassette.    E(5). Gastric Esophageal Junction, GE Junction:  The specimen is received in formalin, labeled with the patient's name, medical record number and other identifying information and designated  GE junction . It consists of a single tan soft tissue fragment, 0.3 cm.  Submitted entirely in 1 cassette.    F(6). Esophagus, Mid, Mid esophagus:  The specimen is received in formalin, labeled with the patient's name, medical record number and other identifying information and designated  mid esophagus . It consists of 2 tan soft tissue fragments ranging from 0.1-0.3 cm. Entirely submitted in one cassette.   (BRODIE Og)3/19/2025 2:43 PM       Microscopic Description 03/19/2025    Final                    Value:Microscopic examination was performed.        Performing Labs 03/19/2025    Final                    Value:The technical component of this testing was completed at [facility].    Stain controls for all stains resulted within this report have been reviewed and show appropriate reactivity.       Upper GI Endoscopy 03/19/2025    Final                    Value:_______________________________________________________________________________  Patient Name: Teresa Brian         Procedure Date: 3/19/2025 10:33 AM  MRN: [id number]                       YOB: 1975  Admit Type: Outpatient                Age: 49  Gender: Female                         Attending MD: PHILLIP LINARES MD,     Total Sedation Time:                    _______________________________________________________________________________     Procedure:             Upper GI endoscopy  Indications:           Suspected esophageal reflux  Providers:             PHILLIP LINARES MD  Referring MD:          DONTRELL NIETO  Medicines:             Monitored Anesthesia Care  Complications:         No immediate complications.  _______________________________________________________________________________  Procedure:             Pre-Anesthesia Assessment:                         - Prior to the procedure, a History and Physical was                          perfo                          rmed, and patient medications, allergies and                          sensitivities were reviewed. The patient's tolerance                          of previous anesthesia was reviewed.                         - The risks and benefits of the procedure and the                          sedation options and risks were discussed with the                          patient. All questions were answered and informed                          consent was obtained.                         - Patient identification and proposed procedure were                          verified prior to the procedure by the physician, the                          nurse and the anesthetist. The procedure was verified                          in the pre-procedure area in the endoscopy suite.                         After obtaining informed consent, the endoscope was                          passed under direct vision. Throughout the procedure,                          the patient's blood pressure, pulse, and oxygen                                                    saturations were monitored continuously. The Barcode                          0146 was introduced through the mouth, and advanced to                          the second part of  duodenum. The upper GI endoscopy                          was accomplished without difficulty. The patient                          tolerated the procedure well.                                                                                   Findings:       The examined duodenum was normal.       A few small semi-pedunculated polyps with no bleeding and no stigmata of        recent bleeding were found in the cardia and in the gastric body.        Biopsies were taken with a cold forceps for histology.       Biopsies were taken with a cold forceps in the gastric antrum for        Helicobacter pylori testing.       A 2 cm hiatal hernia was present.       A widely patent and mild Schatzki ring was found at the gastroesophageal        junction. Biopsies were taken with a cold forceps for histolo                          gy.       Biopsies were taken with a cold forceps in the mid esophagus for        histology.                                                                                   Impression:            - Normal examined duodenum.                         - A few gastric polyps. Biopsied.                         - 2 cm hiatal hernia.                         - Widely patent and mild Schatzki ring. Biopsied.                         - Biopsies were taken with a cold forceps for                          Helicobacter pylori testing.                         - Biopsies were taken with a cold forceps for                          histology in the mid esophagus.  Recommendation:        - Await pathology results.                         - Follow an antireflux regimen indefinitely.                                                                                     Signed Electronically by Phillip Linares MD  __________________________  PHILLIP LINARES MD  3/19/2025 10:56:00 AM  I was physicall                          y present for the entire viewing portion of the exam.  [id number] LINARES MD  Number of  Addenda: 0    Note Initiated On: 3/19/2025 10:33 AM  Scope In:  Scope Out:             Signed Electronically by: Marla Mckee DNP

## 2025-05-15 NOTE — PATIENT INSTRUCTIONS
[facility]   [email or web address]    Omeprazole 40 mg daily for another month then 20 mg daily in morning  Pepcid every evening, may increase to twice a day if needed.  Follow up in August with labs and appointment .   EGD in March 2026

## 2025-06-26 ENCOUNTER — HOSPITAL ENCOUNTER (EMERGENCY)
Facility: CLINIC | Age: 50
Discharge: HOME | End: 2025-06-26
Attending: EMERGENCY MEDICINE
Payer: COMMERCIAL

## 2025-06-26 VITALS
WEIGHT: 225 LBS | DIASTOLIC BLOOD PRESSURE: 91 MMHG | RESPIRATION RATE: 20 BRPM | TEMPERATURE: 98.6 F | SYSTOLIC BLOOD PRESSURE: 151 MMHG | HEIGHT: 65 IN | HEART RATE: 98 BPM | BODY MASS INDEX: 37.49 KG/M2

## 2025-06-26 DIAGNOSIS — K59.01 SLOW TRANSIT CONSTIPATION: ICD-10-CM

## 2025-06-26 DIAGNOSIS — K56.41 FECAL IMPACTION (H): ICD-10-CM

## 2025-06-26 PROCEDURE — 99283 EMERGENCY DEPT VISIT LOW MDM: CPT | Performed by: EMERGENCY MEDICINE

## 2025-06-26 PROCEDURE — 99284 EMERGENCY DEPT VISIT MOD MDM: CPT | Performed by: EMERGENCY MEDICINE

## 2025-06-26 PROCEDURE — 250N000013 HC RX MED GY IP 250 OP 250 PS 637: Performed by: EMERGENCY MEDICINE

## 2025-06-26 PROCEDURE — 250N000009 HC RX 250: Performed by: EMERGENCY MEDICINE

## 2025-06-26 RX ORDER — MAGNESIUM CARB/ALUMINUM HYDROX 105-160MG
296 TABLET,CHEWABLE ORAL ONCE
Status: DISCONTINUED | OUTPATIENT
Start: 2025-06-26 | End: 2025-06-26 | Stop reason: HOSPADM

## 2025-06-26 RX ORDER — LIDOCAINE HYDROCHLORIDE 20 MG/ML
JELLY TOPICAL ONCE
Status: COMPLETED | OUTPATIENT
Start: 2025-06-26 | End: 2025-06-26

## 2025-06-26 RX ORDER — SODIUM PHOSPHATE,MONO-DIBASIC 19G-7G/118
1 ENEMA (ML) RECTAL ONCE
Status: COMPLETED | OUTPATIENT
Start: 2025-06-26 | End: 2025-06-26

## 2025-06-26 RX ADMIN — SODIUM PHOSPHATE, DIBASIC AND SODIUM PHOSPHATE, MONOBASIC 1 ENEMA: 7; 19 ENEMA RECTAL at 13:57

## 2025-06-26 RX ADMIN — LIDOCAINE HYDROCHLORIDE: 20 JELLY TOPICAL at 13:23

## 2025-06-26 ASSESSMENT — ACTIVITIES OF DAILY LIVING (ADL)
ADLS_ACUITY_SCORE: 48

## 2025-06-26 ASSESSMENT — COLUMBIA-SUICIDE SEVERITY RATING SCALE - C-SSRS
1. IN THE PAST MONTH, HAVE YOU WISHED YOU WERE DEAD OR WISHED YOU COULD GO TO SLEEP AND NOT WAKE UP?: NO
2. HAVE YOU ACTUALLY HAD ANY THOUGHTS OF KILLING YOURSELF IN THE PAST MONTH?: NO
6. HAVE YOU EVER DONE ANYTHING, STARTED TO DO ANYTHING, OR PREPARED TO DO ANYTHING TO END YOUR LIFE?: NO

## 2025-06-26 NOTE — DISCHARGE INSTRUCTIONS
Treatment of constipation:    Increase fiber in diet (see handout)  Fiber supplement daily  Milk of Magnesia daily  Miralax once daily  Colace/Docusate (stool softener)   Dulcolax or Senokot if needed (laxative pills or suppositories)  Magnesium Citrate one bottle if needed for severe constipation  Fleets Enema if needed for severe constipation    These are all available over the counter without prescription. And you may use more than one and use them in combination.

## 2025-06-26 NOTE — ED NOTES
Pt was able to have a large BM before admin of fleet enema, refused pain meds. Pt continues to report lower abdominal pain and would like to proceed with the enema.

## 2025-06-26 NOTE — ED TRIAGE NOTES
Pt arrives to the ED from home with c/o lower abdominal pain 8/10 and rectal bleeding. Pt reports last bowel movement on Sunday (6/21). Pt reports taking senna and two suppositories with no success.    Triage Assessment (Adult)       Row Name 06/26/25 1141          Triage Assessment    Airway WDL WDL        Respiratory WDL    Respiratory WDL WDL        Skin Circulation/Temperature WDL    Skin Circulation/Temperature WDL WDL        Cardiac WDL    Cardiac WDL WDL        Peripheral/Neurovascular WDL    Peripheral Neurovascular WDL WDL        Cognitive/Neuro/Behavioral WDL    Cognitive/Neuro/Behavioral WDL WDL

## 2025-06-27 NOTE — ED PROVIDER NOTES
History     Chief Complaint   Patient presents with    Abdominal Pain    Rectal Bleeding     HPI  History per patient, review of [facility] EMR and Care Everywhere EMR.   Teresa Brian is a 49 year old female who had chronic slow transit constipation and history of BMs only every 2-3 days at baseline with no BM for 4 days and rectal pain and fullness and inability to pass stool from the rectum with small amount bright red blood per rectum, then recent development of lower abdominal pain without fever, chills, nausea or vomiting.  She tried senna and 2 suppositories without relief of symptoms.   No other pertinent history or acute complaints or concerns.     Allergies:  Allergies   Allergen Reactions    Vicodin [Hydrocodone-Acetaminophen] Nausea and Vomiting       Problem List:    Patient Active Problem List    Diagnosis Date Noted    Gastroesophageal reflux disease with esophagitis without hemorrhage 05/15/2025     Priority: Medium    Pain of left lower leg 11/18/2024     Priority: Medium    Menometrorrhagia 08/31/2022     Priority: Medium    Aftercare following right shoulder joint replacement surgery 12/30/2019     Priority: Medium     90% torn rotator cuff, cleaned meniscus, impingement, bursitis and arthritis      Impingement syndrome of right shoulder 09/09/2019     Priority: Medium    Scapular dyskinesis 09/09/2019     Priority: Medium    Epigastric pain- chronic intermittent since 4/2017. Extensive normal work-up: hepatic/celiac panels, lipase, TSH, CBC, CRP/ESR, EGD, colonoscopy, HIDA scan, CT abdomen.Per GI may be functional origin  04/29/2019     Priority: Medium     Pt offered trial of nortriptyline by GI, but she declines.  Was an element of constipation - used miralax for 1 month.       Family history of Parkinson disease - Father early onset around age 30-40's 11/28/2018     Priority: Medium    Migraine with aura and without status migrainosus, not intractable 04/27/2017     Priority: Medium    History  of cervical dysplasia      Priority: Medium     (2009) JOANNA I  ([facility])  (2010) LGSIL, ASC-US, +HRHPV;  colpo -->JOANNA I and focal JOANNA II  (2011) LGSIL;  LEEP-->sq metaplasia  (2012) neg pap, +HRHPV  (2013) neg pap, neg HPV  (2016) neg pap, neg HPV;  Resume routine screening      Menorrhagia 10/06/2015     Priority: Medium    S/P ACL reconstruction 11/19/2010     Priority: Medium    CARDIOVASCULAR SCREENING; LDL GOAL LESS THAN 160 10/31/2010     Priority: Medium    H/O LEEP 2010     Priority: Medium     2010 COLP- JOANNA 1 and JOANNA 2  2011 LEEP - negative for dysplasia  2013 NIL pap, Neg HPV  2016 NIL pap, neg HPV  2/15/21 NIL pap, Neg HPV.Plan cotest in 3 years.             Past Medical History:    Past Medical History:   Diagnosis Date    Asthma     BMI 34.0-34.9,adult 10/06/2015    CARDIOVASCULAR SCREENING; LDL GOAL LESS THAN 160     Cervical dysplasia 2010    Condyloma acuminatum 04/2009    Herpes simplex virus infection     Menarche age 15    Menorrhagia     Migraine     Personal history of cervical dysplasia 2010    PONV (postoperative nausea and vomiting)     Right shoulder pain 09/09/2019       Past Surgical History:    Past Surgical History:   Procedure Laterality Date    ARTHROSCOPIC RECONSTRUCTION ANTERIOR CRUCIATE LIGAMENT Right 10/2010    Reconstruction (ACL, MCL) and fibular fx    CATARACT IOL, RT/LT      5/2017, 9/2017    COLONOSCOPY  6/17    COLONOSCOPY N/A 06/27/2017    Procedure: COLONOSCOPY;;  Surgeon: Allan Olivier MD;  Location:  GI    COLPOSCOPY CERVIX, LOOP ELECTRODE BIOPSY, COMBINED  08/2011    squamous metaplasia    COMBINED DILATION AND CURETTAGE, ABLATE ENDOMETRIUM FELICIA N/A 05/02/2019    Procedure: FELICIA ABLATION;  Surgeon: Daniella Duncan MD;  Location:  OR    CYSTOSCOPY N/A 08/31/2022    Procedure: Diagnostic cystoscopy;  Surgeon: Daniella Duncan MD;  Location:  OR    ESOPHAGOSCOPY, GASTROSCOPY, DUODENOSCOPY (EGD), COMBINED      ESOPHAGOSCOPY, GASTROSCOPY,  DUODENOSCOPY (EGD), COMBINED N/A 06/27/2017    Procedure: COMBINED ESOPHAGOSCOPY, GASTROSCOPY, DUODENOSCOPY (EGD), BIOPSY SINGLE OR MULTIPLE;  ESOPHAGOSCOPY, GASTROSCOPY, DUODENOSCOPY (EGD with biopsies by cold forcep, Colonoscopy with biopsies by cold forcep.;  Surgeon: Allan Olivier MD;  Location:  GI    ESOPHAGOSCOPY, GASTROSCOPY, DUODENOSCOPY (EGD), COMBINED N/A 03/19/2025    Procedure: ESOPHAGOGASTRODUODENOSCOPY, WITH BIOPSY;  Surgeon: Padilla Mcneill DO;  Location: [place] [facility]    HC TOOTH EXTRACTION W/FORCEP  2000    wisdom teeth extraction    LAPAROSCOPIC ASSISTED HYSTERECTOMY VAGINAL, BILATERAL SALPINGO-OOPHORECTOMY, COMBINED Bilateral 08/31/2022    Procedure: Laparoscopic assisted vaginal hysterectomy with right salpingectomy, left salpingo-oophorectomy;  Surgeon: Daniella Duncan MD;  Location:  OR    LAPAROSCOPY DIAGNOSTIC (GYN)  1992    negative ([facility])    OPERATIVE HYSTEROSCOPY N/A 05/02/2019    Procedure: HYSTEROSCOPY;  Surgeon: Daniella Duncan MD;  Location:  OR    SHOULDER SURGERY Right 12/30/2019    right shoulder       Family History:    Family History   Problem Relation Age of Onset    Neurologic Disorder Mother         Fibromyalgia. migraines    Genetic Disorder Mother         Fibromyalgia    Other Problems  (esophageal dilation) Mother     Neurologic Disorder Father         parkinson with early dementia    Genetic Disorder Father         Parkinsons, Brain Anuerism    Other Cancer Sister         Skin    GERD Sister     No Known Problems Brother     No Known Problems Maternal Grandmother     No Known Problems Maternal Grandfather     No Known Problems Paternal Grandmother     No Known Problems Paternal Grandfather     No Known Problems Other     Colon Cancer No family hx of     Thyroid Disease No family hx of     Diabetes No family hx of     Hypertension No family hx of     Hyperlipidemia No family hx of     Breast Cancer No family hx of     Skin Cancer No family hx of   "      Social History:  Marital Status:  Single [1]  Social History     Tobacco Use    Smoking status: Never     Passive exposure: Never    Smokeless tobacco: Never   Vaping Use    Vaping status: Never Used   Substance Use Topics    Alcohol use: Yes     Alcohol/week: 2.0 standard drinks of alcohol     Comment: 2 drinks per week avg    Drug use: Never        Medications:    acetaminophen (TYLENOL) 325 MG tablet  acetaminophen (TYLENOL) 500 MG tablet  Bismuth Subsalicylate (PEPTO BISMOL PO)  calcium carbonate (TUMS) 500 MG chewable tablet  cyclobenzaprine (FLEXERIL) 5 MG tablet  famotidine (PEPCID) 20 MG tablet  ibuprofen (ADVIL/MOTRIN) 200 MG tablet  ibuprofen (ADVIL/MOTRIN) 600 MG tablet  meloxicam (MOBIC) 15 MG tablet  omeprazole (PRILOSEC) 20 MG DR capsule  omeprazole (PRILOSEC) 40 MG DR capsule          Review of Systems  As mentioned in the HPI, in addition focused review of systems was negative.    Physical Exam   BP: (!) 151/91  Pulse: 98  Temp: 98.6  F (37  C)  Resp: 20  Height: 165.1 cm (5' 5\")  Weight: 102.1 kg (225 lb)      Physical Exam    ED Course        Procedures              No results found for this or any previous visit (from the past 24 hours).    Medications   sodium phosphate (FLEET ENEMA) 1 enema (1 enema Rectal $Given 6/26/25 1357)   HYDROmorphone (DILAUDID) injection 1.5 mg (1.5 mg Intramuscular Not Given 6/26/25 1325)   lidocaine (XYLOCAINE) 2 % external gel ( Topical $Given 6/26/25 1323)     She was given IM Dilaudid for abdominal rectal pain, and external viscous lidocaine to the anal area, and then a Fleet enema and was able to pass a significant stool with small amount of bright red blood which appears to be of benign nature and due to inflammation of the anus and possible mild proctitis from fecal impaction.  Abdominal pain improved.  She is comfortable with discharge home for continued treatment of constipation.    Assessments & Plan (with Medical Decision Making)   49 year old female " who had chronic slow transit constipation and history of BMs only every 2-3 days at baseline with no BM for 4 days and rectal pain and fullness and inability to pass stool from the rectum with small amount sof bright red blood per rectum  She was given IM Dilaudid and external viscous lidocaine, and a Fleet enema and was able to pass a significant stool with small amount of bright red blood which appears to be of benign nature and due to inflammation of the anus and possible mild proctitis from fecal impaction.  She is comfortable with discharge home for continued treatment of constipation.  She is discharged with bottle magnesium citrate and instructions for use of over-the-counter medicines for slow transit constipation, and supportive care.  She return as needed for symptoms of intractable constipation or fecal impaction.    I have reviewed the nursing notes.    I have reviewed the findings, diagnosis, plan and need for follow up with the patient.    Discharge Medication List as of 6/26/2025  3:11 PM          Final diagnoses:   Slow transit constipation   Fecal impaction (H)       6/26/2025   [facility] EMERGENCY DEPT       Tramaine Cordova MD  06/26/25 [id number]

## 2025-07-16 ENCOUNTER — PATIENT OUTREACH (OUTPATIENT)
Dept: CARE COORDINATION | Facility: CLINIC | Age: 50
End: 2025-07-16
Payer: COMMERCIAL

## 2025-07-21 ENCOUNTER — PATIENT OUTREACH (OUTPATIENT)
Dept: CARE COORDINATION | Facility: CLINIC | Age: 50
End: 2025-07-21
Payer: COMMERCIAL

## (undated) DEVICE — SUCTION TIP YANKAUER W/O VENT K86

## (undated) DEVICE — SOL NACL 0.9% INJ 1000ML BAG 2B1324X

## (undated) DEVICE — Device

## (undated) DEVICE — NDL COUNTER 40CT  31142311

## (undated) DEVICE — TUBING IRRIG CYSTO/BLADDER SET 81" LF 2C4040

## (undated) DEVICE — ESU PENCIL W/HOLSTER E2350H

## (undated) DEVICE — ENDO SCOPE WARMER LF TM500

## (undated) DEVICE — GLOVE PROTEXIS MICRO 7.0  2D73PM70

## (undated) DEVICE — LINEN TOWEL PACK X5 5464

## (undated) DEVICE — TUBING IRRIG TUR Y TYPE 96" LF 6543-01

## (undated) DEVICE — NDL 19GA 1.5"

## (undated) DEVICE — SUCTION IRR STRYKERFLOW II W/TIP 250-070-520

## (undated) DEVICE — GLOVE PROTEXIS W/NEU-THERA 6.0  2D73TE60

## (undated) DEVICE — ENDO FORCEP ENDOJAW BIOPSY 2.8MMX160CM FB-220K

## (undated) DEVICE — DECANTER BAG 2002S

## (undated) DEVICE — SOL WATER IRRIG 1000ML BOTTLE 2F7114

## (undated) DEVICE — DEVICE ENDOMETRIAL ABLATION SYS MINERVA HANDPIECE MIN9770

## (undated) DEVICE — DECANTER VIAL 2006S

## (undated) DEVICE — PREP DURAPREP 26ML APL 8630

## (undated) DEVICE — SYR 10ML FINGER CONTROL W/O NDL 309695

## (undated) DEVICE — ESU HOLDER LAP INST DISP PURPLE LONG 330MM H-PRO-330

## (undated) DEVICE — DEVICE SUTURE GRASPER TROCAR CLOSURE 14GA PMITCSG

## (undated) DEVICE — SOL NACL 0.9% IRRIG 1000ML BOTTLE 2F7124

## (undated) DEVICE — ESU GROUND PAD UNIVERSAL W/O CORD

## (undated) DEVICE — ENDO TROCAR FIRST ENTRY KII FIOS ADV FIX 05X100MM CFF03

## (undated) DEVICE — SU VICRYL 0 UR-6 27" J603H

## (undated) DEVICE — NDL SPINAL 22GA 3.5" QUINCKE 405181

## (undated) DEVICE — ESU HANDPIECE BIPOLAR THUNDERBEAT 5MMX35CM TB-0535PC

## (undated) DEVICE — SU VICRYL 0 CT-1 CR 8X18" J740D

## (undated) DEVICE — PACK SET-UP STD 9102

## (undated) DEVICE — BLADE KNIFE SURG 10 371110

## (undated) DEVICE — SOL WATER 3000ML BAG 7973-08

## (undated) DEVICE — BLADE KNIFE SURG 15 371115

## (undated) DEVICE — NDL 22GA 1.5"

## (undated) DEVICE — SPONGE RAY-TEC 4X8" 7318

## (undated) DEVICE — EVAC SYSTEM CLEAR FLOW SC082500

## (undated) DEVICE — KIT ENDO TURNOVER/PROCEDURE W/CLEAN A SCOPE LINERS 103888

## (undated) DEVICE — SUCTION CANISTER MEDIVAC LINER 3000ML W/LID 65651-530

## (undated) DEVICE — SU PDS II 0 CT-2 27" Z334H

## (undated) DEVICE — ENDO FORCEP ENDOJAW BIOPSY 2.8MMX230CM FB-220U

## (undated) DEVICE — BLADE CLIPPER 4406

## (undated) DEVICE — SURGICEL POWDER ABSORBABLE HEMOSTAT 3GM 3013SP

## (undated) DEVICE — SOL NACL 0.9% INJ 250ML BAG 2B1322Q

## (undated) DEVICE — GLOVE PROTEXIS W/NEU-THERA 6.5  2D73TE65

## (undated) DEVICE — GLOVE PROTEXIS BLUE W/NEU-THERA 7.0  2D73EB70

## (undated) DEVICE — SU VICRYL 4-0 PS-2 18" UND J496H

## (undated) DEVICE — ENDO TROCAR SLEEVE KII Z-THREADED 05X100MM CTS02

## (undated) DEVICE — CATH TRAY FOLEY 16FR BARDEX W/DRAIN BAG STATLOCK 300316A

## (undated) DEVICE — PACK TVT HYSTEROSCOPY SMA15HYFSE

## (undated) RX ORDER — DEXAMETHASONE SODIUM PHOSPHATE 4 MG/ML
INJECTION, SOLUTION INTRA-ARTICULAR; INTRALESIONAL; INTRAMUSCULAR; INTRAVENOUS; SOFT TISSUE
Status: DISPENSED
Start: 2019-05-02

## (undated) RX ORDER — GLYCOPYRROLATE 0.2 MG/ML
INJECTION, SOLUTION INTRAMUSCULAR; INTRAVENOUS
Status: DISPENSED
Start: 2025-03-19

## (undated) RX ORDER — PHENAZOPYRIDINE HYDROCHLORIDE 200 MG/1
TABLET, FILM COATED ORAL
Status: DISPENSED
Start: 2022-08-31

## (undated) RX ORDER — LIDOCAINE HYDROCHLORIDE 20 MG/ML
INJECTION, SOLUTION EPIDURAL; INFILTRATION; INTRACAUDAL; PERINEURAL
Status: DISPENSED
Start: 2022-08-31

## (undated) RX ORDER — HYDROMORPHONE HCL IN WATER/PF 6 MG/30 ML
PATIENT CONTROLLED ANALGESIA SYRINGE INTRAVENOUS
Status: DISPENSED
Start: 2022-08-31

## (undated) RX ORDER — PROPOFOL 10 MG/ML
INJECTION, EMULSION INTRAVENOUS
Status: DISPENSED
Start: 2025-03-19

## (undated) RX ORDER — FENTANYL CITRATE 50 UG/ML
INJECTION, SOLUTION INTRAMUSCULAR; INTRAVENOUS
Status: DISPENSED
Start: 2019-05-02

## (undated) RX ORDER — HYDROXYZINE HYDROCHLORIDE 50 MG/ML
INJECTION, SOLUTION INTRAMUSCULAR
Status: DISPENSED
Start: 2022-08-31

## (undated) RX ORDER — FENTANYL CITRATE 0.05 MG/ML
INJECTION, SOLUTION INTRAMUSCULAR; INTRAVENOUS
Status: DISPENSED
Start: 2022-08-31

## (undated) RX ORDER — PROPOFOL 10 MG/ML
INJECTION, EMULSION INTRAVENOUS
Status: DISPENSED
Start: 2022-08-31

## (undated) RX ORDER — ONDANSETRON 2 MG/ML
INJECTION INTRAMUSCULAR; INTRAVENOUS
Status: DISPENSED
Start: 2022-08-31

## (undated) RX ORDER — DEXAMETHASONE SODIUM PHOSPHATE 4 MG/ML
INJECTION, SOLUTION INTRA-ARTICULAR; INTRALESIONAL; INTRAMUSCULAR; INTRAVENOUS; SOFT TISSUE
Status: DISPENSED
Start: 2022-08-31

## (undated) RX ORDER — KETOROLAC TROMETHAMINE 30 MG/ML
INJECTION, SOLUTION INTRAMUSCULAR; INTRAVENOUS
Status: DISPENSED
Start: 2019-05-02

## (undated) RX ORDER — FENTANYL CITRATE 50 UG/ML
INJECTION, SOLUTION INTRAMUSCULAR; INTRAVENOUS
Status: DISPENSED
Start: 2017-06-27

## (undated) RX ORDER — FENTANYL CITRATE 50 UG/ML
INJECTION, SOLUTION INTRAMUSCULAR; INTRAVENOUS
Status: DISPENSED
Start: 2022-08-31

## (undated) RX ORDER — ONDANSETRON 2 MG/ML
INJECTION INTRAMUSCULAR; INTRAVENOUS
Status: DISPENSED
Start: 2017-06-27

## (undated) RX ORDER — HYDROMORPHONE HYDROCHLORIDE 1 MG/ML
INJECTION, SOLUTION INTRAMUSCULAR; INTRAVENOUS; SUBCUTANEOUS
Status: DISPENSED
Start: 2019-05-02

## (undated) RX ORDER — SCOLOPAMINE TRANSDERMAL SYSTEM 1 MG/1
PATCH, EXTENDED RELEASE TRANSDERMAL
Status: DISPENSED
Start: 2019-05-02

## (undated) RX ORDER — ACETAMINOPHEN 325 MG/1
TABLET ORAL
Status: DISPENSED
Start: 2019-05-02

## (undated) RX ORDER — GLYCOPYRROLATE 0.2 MG/ML
INJECTION, SOLUTION INTRAMUSCULAR; INTRAVENOUS
Status: DISPENSED
Start: 2019-05-02

## (undated) RX ORDER — PROPOFOL 10 MG/ML
INJECTION, EMULSION INTRAVENOUS
Status: DISPENSED
Start: 2019-05-02

## (undated) RX ORDER — ACETAMINOPHEN 325 MG/1
TABLET ORAL
Status: DISPENSED
Start: 2022-08-31

## (undated) RX ORDER — ONDANSETRON 2 MG/ML
INJECTION INTRAMUSCULAR; INTRAVENOUS
Status: DISPENSED
Start: 2019-05-02

## (undated) RX ORDER — LIDOCAINE HYDROCHLORIDE 10 MG/ML
INJECTION, SOLUTION EPIDURAL; INFILTRATION; INTRACAUDAL; PERINEURAL
Status: DISPENSED
Start: 2019-05-02

## (undated) RX ORDER — LIDOCAINE HYDROCHLORIDE 10 MG/ML
INJECTION, SOLUTION EPIDURAL; INFILTRATION; INTRACAUDAL; PERINEURAL
Status: DISPENSED
Start: 2025-03-19

## (undated) RX ORDER — HYDROMORPHONE HYDROCHLORIDE 1 MG/ML
INJECTION, SOLUTION INTRAMUSCULAR; INTRAVENOUS; SUBCUTANEOUS
Status: DISPENSED
Start: 2022-08-31

## (undated) RX ORDER — APREPITANT 40 MG/1
CAPSULE ORAL
Status: DISPENSED
Start: 2022-08-31